# Patient Record
Sex: FEMALE | Race: WHITE | NOT HISPANIC OR LATINO | Employment: OTHER | URBAN - METROPOLITAN AREA
[De-identification: names, ages, dates, MRNs, and addresses within clinical notes are randomized per-mention and may not be internally consistent; named-entity substitution may affect disease eponyms.]

---

## 2017-01-10 ENCOUNTER — TELEPHONE (OUTPATIENT)
Dept: GASTROENTEROLOGY | Facility: CLINIC | Age: 72
End: 2017-01-10

## 2017-01-10 NOTE — TELEPHONE ENCOUNTER
----- Message from Sierra Amaya sent at 1/10/2017 11:49 AM CST -----  Patient is requesting to reschedule her colonoscopy on 01/19/17 contact patient at [phone] to reschedule.    Thank you

## 2017-01-12 ENCOUNTER — LAB VISIT (OUTPATIENT)
Dept: LAB | Facility: HOSPITAL | Age: 72
End: 2017-01-12
Attending: INTERNAL MEDICINE
Payer: MEDICARE

## 2017-01-12 ENCOUNTER — OFFICE VISIT (OUTPATIENT)
Dept: HEMATOLOGY/ONCOLOGY | Facility: CLINIC | Age: 72
End: 2017-01-12
Payer: MEDICARE

## 2017-01-12 VITALS
HEIGHT: 65 IN | RESPIRATION RATE: 16 BRPM | BODY MASS INDEX: 20.61 KG/M2 | DIASTOLIC BLOOD PRESSURE: 77 MMHG | HEART RATE: 81 BPM | TEMPERATURE: 99 F | WEIGHT: 123.69 LBS | SYSTOLIC BLOOD PRESSURE: 138 MMHG

## 2017-01-12 DIAGNOSIS — F31.0 BIPOLAR AFFECTIVE DISORDER, CURRENT EPISODE HYPOMANIC: Primary | ICD-10-CM

## 2017-01-12 DIAGNOSIS — Z98.84 GASTRIC BYPASS STATUS FOR OBESITY: ICD-10-CM

## 2017-01-12 DIAGNOSIS — I27.20 PULMONARY HYPERTENSION: ICD-10-CM

## 2017-01-12 DIAGNOSIS — Z98.890 S/P MVR (MITRAL VALVE REPAIR): ICD-10-CM

## 2017-01-12 DIAGNOSIS — I25.10 CORONARY ARTERY DISEASE INVOLVING NATIVE CORONARY ARTERY OF NATIVE HEART WITHOUT ANGINA PECTORIS: ICD-10-CM

## 2017-01-12 DIAGNOSIS — Z95.0 PACEMAKER: ICD-10-CM

## 2017-01-12 DIAGNOSIS — D64.9 SEVERE ANEMIA: ICD-10-CM

## 2017-01-12 DIAGNOSIS — E53.8 B12 DEFICIENCY: ICD-10-CM

## 2017-01-12 DIAGNOSIS — D50.0 IRON DEFICIENCY ANEMIA DUE TO CHRONIC BLOOD LOSS: ICD-10-CM

## 2017-01-12 LAB
ALBUMIN SERPL BCP-MCNC: 4.2 G/DL
ALP SERPL-CCNC: 90 U/L
ALT SERPL W/O P-5'-P-CCNC: 31 U/L
ANION GAP SERPL CALC-SCNC: 12 MMOL/L
AST SERPL-CCNC: 28 U/L
BASOPHILS # BLD AUTO: 0.05 K/UL
BASOPHILS NFR BLD: 1.1 %
BILIRUB SERPL-MCNC: 0.5 MG/DL
BUN SERPL-MCNC: 16 MG/DL
CALCIUM SERPL-MCNC: 9.1 MG/DL
CHLORIDE SERPL-SCNC: 101 MMOL/L
CO2 SERPL-SCNC: 30 MMOL/L
CREAT SERPL-MCNC: 0.9 MG/DL
DIFFERENTIAL METHOD: ABNORMAL
EOSINOPHIL # BLD AUTO: 0.1 K/UL
EOSINOPHIL NFR BLD: 2.8 %
ERYTHROCYTE [DISTWIDTH] IN BLOOD BY AUTOMATED COUNT: 13.7 %
EST. GFR  (AFRICAN AMERICAN): >60 ML/MIN/1.73 M^2
EST. GFR  (NON AFRICAN AMERICAN): >60 ML/MIN/1.73 M^2
GLUCOSE SERPL-MCNC: 88 MG/DL
HCT VFR BLD AUTO: 34.1 %
HGB BLD-MCNC: 11.1 G/DL
LYMPHOCYTES # BLD AUTO: 1.3 K/UL
LYMPHOCYTES NFR BLD: 29.7 %
MCH RBC QN AUTO: 30.1 PG
MCHC RBC AUTO-ENTMCNC: 32.6 %
MCV RBC AUTO: 92 FL
MONOCYTES # BLD AUTO: 0.5 K/UL
MONOCYTES NFR BLD: 10.8 %
NEUTROPHILS # BLD AUTO: 2.4 K/UL
NEUTROPHILS NFR BLD: 55.6 %
PLATELET # BLD AUTO: 161 K/UL
PMV BLD AUTO: 10.8 FL
POTASSIUM SERPL-SCNC: 4.5 MMOL/L
PROT SERPL-MCNC: 6.6 G/DL
RBC # BLD AUTO: 3.69 M/UL
SODIUM SERPL-SCNC: 143 MMOL/L
WBC # BLD AUTO: 4.35 K/UL

## 2017-01-12 PROCEDURE — 99214 OFFICE O/P EST MOD 30 MIN: CPT | Mod: S$GLB,,, | Performed by: INTERNAL MEDICINE

## 2017-01-12 PROCEDURE — 1157F ADVNC CARE PLAN IN RCRD: CPT | Mod: S$GLB,,, | Performed by: INTERNAL MEDICINE

## 2017-01-12 PROCEDURE — 99499 UNLISTED E&M SERVICE: CPT | Mod: S$GLB,,, | Performed by: INTERNAL MEDICINE

## 2017-01-12 PROCEDURE — 99999 PR PBB SHADOW E&M-EST. PATIENT-LVL III: CPT | Mod: PBBFAC,,, | Performed by: INTERNAL MEDICINE

## 2017-01-12 PROCEDURE — 1159F MED LIST DOCD IN RCRD: CPT | Mod: S$GLB,,, | Performed by: INTERNAL MEDICINE

## 2017-01-12 PROCEDURE — 1160F RVW MEDS BY RX/DR IN RCRD: CPT | Mod: S$GLB,,, | Performed by: INTERNAL MEDICINE

## 2017-01-12 PROCEDURE — 3078F DIAST BP <80 MM HG: CPT | Mod: S$GLB,,, | Performed by: INTERNAL MEDICINE

## 2017-01-12 PROCEDURE — 1126F AMNT PAIN NOTED NONE PRSNT: CPT | Mod: S$GLB,,, | Performed by: INTERNAL MEDICINE

## 2017-01-12 PROCEDURE — 3075F SYST BP GE 130 - 139MM HG: CPT | Mod: S$GLB,,, | Performed by: INTERNAL MEDICINE

## 2017-01-12 RX ORDER — CARBAMAZEPINE 200 MG/1
200 TABLET ORAL 2 TIMES DAILY
Status: ON HOLD | COMMUNITY
End: 2019-04-19 | Stop reason: HOSPADM

## 2017-01-12 NOTE — PROGRESS NOTES
"Subjective:       Patient ID: Dulce Castro is a 71 y.o. female.    Chief Complaint:was receiving transfusion when she got hypotensive, taken to [facility], completed prbc in hospital. Was able to get one unit here the 1st day,   HPI:   A 70-year-old  woman, very well known to me. The patient has pulmonary  hypertension, coronary artery disease, anemia, neutropenia and had a history of  gastric bypass and bipolar disorder. She has been following clinically. She   states her pulmonary hypertension has stabilized. She follows with   pulmonologist, and she follows with Cardiology as well.lately the tegretol has been lowered. Not drinking, no other drugs. Feeling better now since dc from hospital 12/23, occult blood was positive, pt elected for op scopes  REVIEW OF SYSTEMS:   Has a pacemaker past 6 months, feels tired, weak, sleepy, and no go"  No CP occ palpitations, feels SOB on minial activity from pulmonary HTN, has occult blood positive at [facility]    has not noticed bleeding,   denies abd pain, nausea, vomiting, cough or sputum production   occ feet swelling    PHYSICAL EXAM:     Vitals:    01/12/17 1039   BP: 138/77   Pulse: 81   Resp: 16   Temp: 99.2 °F (37.3 °C)       GENERAL: Comfortable looking patient. Patient is in no distress.  Awake, alert and oriented to time, person and place.  No anxiety, or agitation.      HEENT: Normal conjunctivae and eyelids. WNL.  PERRLA 3 to 4 mm. No icterus, no pallor, no congestion, and no discharge noted.     NECK:  Supple. Trachea is central.  No crepitus.  No JVD or masses.    RESPIRATORY:  No intercostal retractions.  No dullness to percussion.  Chest is clear to auscultation.  No rales, rhonchi or wheezes.  No crepitus.  Good air entry bilaterally.    CARDIOVASCULAR:  S1 and S2 are normally heard without murmurs or gallops.  All peripheral pulses are present.    ABDOMEN:  Normal abdomen.  No hepatosplenomegaly.  No free fluid.  Bowel sounds are present.  No hernia noted. " No masses.  No rebound or tenderness.  No guarding or rigidity.  Umbilicus is midline.    LYMPHATICS:  No axillary, cervical, supraclavicular, submental, or inguinal lymphadenopathy.    SKIN/MUSCULOSKELETAL:  There is no evidence of excoriation marks or ecchmosis.  No rashes.  No cyanosis.  No clubbing.  No joint or skeletal deformities noted.  Normal range of motion.    NEUROLOGIC:  Higher functions are appropriate.  No cranial nerve deficits.  Normal esbastián.  Normal strength.  Motor and sensory functions are normal.  Deep tendon reflexes are normal.    GENITAL/RECTAL:  Exams are deferred.      Laboratory:     CBC:  Lab Results   Component Value Date    WBC 4.35 01/12/2017    RBC 3.69 (L) 01/12/2017    HGB 11.1 (L) 01/12/2017    HCT 34.1 (L) 01/12/2017    MCV 92 01/12/2017    MCH 30.1 01/12/2017    MCHC 32.6 01/12/2017    RDW 13.7 01/12/2017     01/12/2017    MPV 10.8 01/12/2017    GRAN 2.4 01/12/2017    GRAN 55.6 01/12/2017    LYMPH 1.3 01/12/2017    LYMPH 29.7 01/12/2017    MONO 0.5 01/12/2017    MONO 10.8 01/12/2017    EOS 0.1 01/12/2017    BASO 0.05 01/12/2017    EOSINOPHIL 2.8 01/12/2017    BASOPHIL 1.1 01/12/2017       BMP: BMP  Lab Results   Component Value Date     01/12/2017    K 4.5 01/12/2017     01/12/2017    CO2 30 (H) 01/12/2017    BUN 16 01/12/2017    CREATININE 0.9 01/12/2017    CALCIUM 9.1 01/12/2017    ANIONGAP 12 01/12/2017    ESTGFRAFRICA >60 01/12/2017    EGFRNONAA >60 01/12/2017       LFT:   Lab Results   Component Value Date    ALT 31 01/12/2017    AST 28 01/12/2017    ALKPHOS 90 01/12/2017    BILITOT 0.5 01/12/2017     Lab Results   Component Value Date    UHRQYZRU51 228 12/13/2016     Haptoglobin less than 10  Assessment/Plan:       1. Bipolar affective disorder, current episode hypomanic    2. Pulmonary hypertension    3. Coronary artery disease involving native coronary artery of native heart without angina pectoris    4. Iron deficiency anemia due to chronic blood loss     5. S/P MVR (mitral valve repair)    6. Pacemaker      hgb has stabilized, may have had a component of hemolysis.   Will check ash as well  Has appt with Dr. magana for scopes in feb.   Needs to take her b12 daily.  Ash, ldh, haptoglobin, cbc, b12, cmp   rtc 4-6 weeks

## 2017-01-23 ENCOUNTER — CLINICAL SUPPORT (OUTPATIENT)
Dept: CARDIOLOGY | Facility: CLINIC | Age: 72
End: 2017-01-23
Payer: MEDICARE

## 2017-01-23 DIAGNOSIS — R00.1 BRADYCARDIA: ICD-10-CM

## 2017-01-23 DIAGNOSIS — Z95.0 CARDIAC PACEMAKER IN SITU: Primary | ICD-10-CM

## 2017-01-23 DIAGNOSIS — Z95.0 CARDIAC PACEMAKER IN SITU: ICD-10-CM

## 2017-01-23 PROCEDURE — 93296 REM INTERROG EVL PM/IDS: CPT | Mod: S$GLB,,, | Performed by: INTERNAL MEDICINE

## 2017-01-23 PROCEDURE — 93294 REM INTERROG EVL PM/LDLS PM: CPT | Mod: S$GLB,,, | Performed by: INTERNAL MEDICINE

## 2017-02-14 ENCOUNTER — TELEPHONE (OUTPATIENT)
Dept: GASTROENTEROLOGY | Facility: CLINIC | Age: 72
End: 2017-02-14

## 2017-02-14 NOTE — TELEPHONE ENCOUNTER
----- Message from Archana Freeman sent at 2/14/2017  9:11 AM CST -----  Contact: self  Patient wants speak with a nurse regarding the instruction for her colonoscopy. Please call back at [phone] (bbfm)

## 2017-02-16 RX ORDER — GENTAMICIN SULFATE 80 MG/100ML
80 INJECTION, SOLUTION INTRAVENOUS ONCE
Status: COMPLETED | OUTPATIENT
Start: 2017-02-17 | End: 2017-02-17

## 2017-02-17 ENCOUNTER — SURGERY (OUTPATIENT)
Age: 72
End: 2017-02-17

## 2017-02-17 ENCOUNTER — HOSPITAL ENCOUNTER (OUTPATIENT)
Facility: HOSPITAL | Age: 72
Discharge: HOME | End: 2017-02-17
Attending: INTERNAL MEDICINE | Admitting: INTERNAL MEDICINE
Payer: MEDICARE

## 2017-02-17 ENCOUNTER — ANESTHESIA EVENT (OUTPATIENT)
Dept: ENDOSCOPY | Facility: HOSPITAL | Age: 72
End: 2017-02-17
Payer: MEDICARE

## 2017-02-17 ENCOUNTER — ANESTHESIA (OUTPATIENT)
Dept: ENDOSCOPY | Facility: HOSPITAL | Age: 72
End: 2017-02-17
Payer: MEDICARE

## 2017-02-17 ENCOUNTER — TELEPHONE (OUTPATIENT)
Dept: FAMILY MEDICINE | Facility: CLINIC | Age: 72
End: 2017-02-17

## 2017-02-17 VITALS
TEMPERATURE: 98 F | SYSTOLIC BLOOD PRESSURE: 131 MMHG | BODY MASS INDEX: 19.99 KG/M2 | HEIGHT: 65 IN | WEIGHT: 120 LBS | RESPIRATION RATE: 16 BRPM | OXYGEN SATURATION: 99 % | HEART RATE: 64 BPM | DIASTOLIC BLOOD PRESSURE: 74 MMHG

## 2017-02-17 VITALS — RESPIRATION RATE: 20 BRPM

## 2017-02-17 DIAGNOSIS — D64.9 ANEMIA: ICD-10-CM

## 2017-02-17 LAB
H PYLORI INDEX VALUE: NEGATIVE
HGB BLD-MCNC: 10.4 G/DL

## 2017-02-17 PROCEDURE — 88305 TISSUE EXAM BY PATHOLOGIST: CPT | Mod: 26,,, | Performed by: PATHOLOGY

## 2017-02-17 PROCEDURE — 63600175 PHARM REV CODE 636 W HCPCS: Mod: PO | Performed by: INTERNAL MEDICINE

## 2017-02-17 PROCEDURE — D9220A PRA ANESTHESIA: Mod: CRNA,,, | Performed by: NURSE ANESTHETIST, CERTIFIED REGISTERED

## 2017-02-17 PROCEDURE — 37000009 HC ANESTHESIA EA ADD 15 MINS: Mod: PO | Performed by: INTERNAL MEDICINE

## 2017-02-17 PROCEDURE — 43239 EGD BIOPSY SINGLE/MULTIPLE: CPT | Mod: 51,,, | Performed by: INTERNAL MEDICINE

## 2017-02-17 PROCEDURE — 45385 COLONOSCOPY W/LESION REMOVAL: CPT | Mod: ,,, | Performed by: INTERNAL MEDICINE

## 2017-02-17 PROCEDURE — 27201089 HC SNARE, DISP (ANY): Mod: PO | Performed by: INTERNAL MEDICINE

## 2017-02-17 PROCEDURE — 25000003 PHARM REV CODE 250: Mod: PO | Performed by: INTERNAL MEDICINE

## 2017-02-17 PROCEDURE — 25000003 PHARM REV CODE 250: Mod: PO | Performed by: NURSE ANESTHETIST, CERTIFIED REGISTERED

## 2017-02-17 PROCEDURE — 45385 COLONOSCOPY W/LESION REMOVAL: CPT | Mod: PO | Performed by: INTERNAL MEDICINE

## 2017-02-17 PROCEDURE — 85018 HEMOGLOBIN: CPT | Mod: PO

## 2017-02-17 PROCEDURE — 27201012 HC FORCEPS, HOT/COLD, DISP: Mod: PO | Performed by: INTERNAL MEDICINE

## 2017-02-17 PROCEDURE — 43239 EGD BIOPSY SINGLE/MULTIPLE: CPT | Mod: PO | Performed by: INTERNAL MEDICINE

## 2017-02-17 PROCEDURE — 88305 TISSUE EXAM BY PATHOLOGIST: CPT | Performed by: PATHOLOGY

## 2017-02-17 PROCEDURE — 63600175 PHARM REV CODE 636 W HCPCS: Mod: PO | Performed by: NURSE ANESTHETIST, CERTIFIED REGISTERED

## 2017-02-17 PROCEDURE — 37000008 HC ANESTHESIA 1ST 15 MINUTES: Mod: PO | Performed by: INTERNAL MEDICINE

## 2017-02-17 PROCEDURE — D9220A PRA ANESTHESIA: Mod: ANES,,, | Performed by: ANESTHESIOLOGY

## 2017-02-17 PROCEDURE — 87449 NOS EACH ORGANISM AG IA: CPT | Mod: PO | Performed by: INTERNAL MEDICINE

## 2017-02-17 RX ORDER — FLUTICASONE PROPIONATE 50 MCG
2 SPRAY, SUSPENSION (ML) NASAL DAILY
Qty: 16 G | Refills: 11 | Status: SHIPPED | OUTPATIENT
Start: 2017-02-17 | End: 2017-05-23

## 2017-02-17 RX ORDER — LIDOCAINE HYDROCHLORIDE 10 MG/ML
1 INJECTION INFILTRATION; PERINEURAL ONCE
Status: COMPLETED | OUTPATIENT
Start: 2017-02-17 | End: 2017-02-17

## 2017-02-17 RX ORDER — FENTANYL CITRATE 50 UG/ML
INJECTION, SOLUTION INTRAMUSCULAR; INTRAVENOUS
Status: DISCONTINUED | OUTPATIENT
Start: 2017-02-17 | End: 2017-02-17

## 2017-02-17 RX ORDER — LIDOCAINE HCL/PF 100 MG/5ML
SYRINGE (ML) INTRAVENOUS
Status: DISCONTINUED | OUTPATIENT
Start: 2017-02-17 | End: 2017-02-17

## 2017-02-17 RX ORDER — PROPOFOL 10 MG/ML
VIAL (ML) INTRAVENOUS
Status: DISCONTINUED | OUTPATIENT
Start: 2017-02-17 | End: 2017-02-17

## 2017-02-17 RX ORDER — SODIUM CHLORIDE, SODIUM LACTATE, POTASSIUM CHLORIDE, CALCIUM CHLORIDE 600; 310; 30; 20 MG/100ML; MG/100ML; MG/100ML; MG/100ML
INJECTION, SOLUTION INTRAVENOUS CONTINUOUS
Status: DISCONTINUED | OUTPATIENT
Start: 2017-02-17 | End: 2017-02-17 | Stop reason: HOSPADM

## 2017-02-17 RX ADMIN — PROPOFOL 10 MG: 10 INJECTION, EMULSION INTRAVENOUS at 09:02

## 2017-02-17 RX ADMIN — LIDOCAINE HYDROCHLORIDE 50 MG: 20 INJECTION, SOLUTION INTRAVENOUS at 09:02

## 2017-02-17 RX ADMIN — PROPOFOL 10 MG: 10 INJECTION, EMULSION INTRAVENOUS at 10:02

## 2017-02-17 RX ADMIN — PROPOFOL 20 MG: 10 INJECTION, EMULSION INTRAVENOUS at 10:02

## 2017-02-17 RX ADMIN — PROPOFOL 30 MG: 10 INJECTION, EMULSION INTRAVENOUS at 09:02

## 2017-02-17 RX ADMIN — AMPICILLIN SODIUM 2 G: 500 INJECTION, POWDER, FOR SOLUTION INTRAMUSCULAR; INTRAVENOUS at 08:02

## 2017-02-17 RX ADMIN — SODIUM CHLORIDE, SODIUM LACTATE, POTASSIUM CHLORIDE, AND CALCIUM CHLORIDE: .6; .31; .03; .02 INJECTION, SOLUTION INTRAVENOUS at 08:02

## 2017-02-17 RX ADMIN — FENTANYL CITRATE 25 MCG: 50 INJECTION, SOLUTION INTRAMUSCULAR; INTRAVENOUS at 09:02

## 2017-02-17 RX ADMIN — GENTAMICIN SULFATE 80 MG: 80 INJECTION, SOLUTION INTRAVENOUS at 09:02

## 2017-02-17 RX ADMIN — TOPICAL ANESTHETIC 1 EACH: 200 SPRAY DENTAL; PERIODONTAL at 09:02

## 2017-02-17 RX ADMIN — LIDOCAINE HYDROCHLORIDE 1 ML: 10 INJECTION, SOLUTION EPIDURAL; INFILTRATION; INTRACAUDAL; PERINEURAL at 08:02

## 2017-02-17 NOTE — ANESTHESIA PREPROCEDURE EVALUATION
02/17/2017  Dulce Castro is a 71 y.o., female.    [facility] Anesthesia Evaluation    I have reviewed the Patient Summary Reports.    I have reviewed the Nursing Notes.   I have reviewed the Medications.     Review of Systems  Anesthesia Hx:  No problems with previous Anesthesia    Social:  Non-Smoker    Hematology/Oncology:     Oncology Normal    -- Anemia:   EENT/Dental:EENT/Dental Normal   Cardiovascular:   Hypertension CAD  CABG/stent  Denies Dysrhythmias.  CHF VEE NYHA Classification II ECG has been reviewed. Pulmonary HTN    12/19/16 echo:    CONCLUSIONS     1 - Biatrial enlargement.     2 - Normal left ventricular systolic function (EF 55-60%).     3 - Left ventricular diastolic dysfunction.     4 - Normal right ventricular systolic function .     5 - Pulmonary hypertension. The estimated PA systolic pressure is 45 mmHg.     6 - Mild aortic stenosis, ELLIOTT = 1.89 cm2, peak velocity = 2.06 m/s.     7 - Moderate to severe aortic regurgitation.     8 - Trivial mitral regurgitation.     9 - Moderate tricuspid regurgitation.    Pacemaker, EKG, atrial pacing        Pulmonary:   Shortness of breath Recent URI: pulmonary HTN. Sleep Apnea On home O2 for Pulm HTN   Hepatic/GI:   GERD, well controlled    Neurological:   Neuromuscular Disease,    Endocrine:   Hypothyroidism    Psych:   Psychiatric History          Physical Exam  General:  Well nourished    Airway/Jaw/Neck:  Airway Findings: Mouth Opening: Normal Tongue: Normal  General Airway Assessment: Adult  Mallampati: II  Improves to I with phonation.  TM Distance: 4 - 6 cm       Chest/Lungs:  Chest/Lungs Findings: Clear to auscultation, Normal Respiratory Rate     Heart/Vascular:  Heart Findings: Rate: Normal  Rhythm: Regular Rhythm             Anesthesia Plan  Type of Anesthesia, risks & benefits discussed:  Anesthesia Type:  general  Patient's Preference:    Intra-op Monitoring Plan:   Intra-op Monitoring Plan Comments:   Post Op Pain Control Plan:   Post Op Pain Control Plan Comments:   Induction:    Beta Blocker:  Patient is not currently on a Beta-Blocker (No further documentation required).       Informed Consent: Patient understands risks and agrees with Anesthesia plan.  Questions answered. Anesthesia consent signed with patient.  ASA Score: 3     Day of Surgery Review of History & Physical: I have interviewed and examined the patient. I have reviewed the patient's H&P dated:  There are no significant changes.          Ready For Surgery From Anesthesia Perspective.

## 2017-02-17 NOTE — BRIEF OP NOTE
Discharge Note  Short Stay      SUMMARY     Admit Date: 2/17/2017    Attending Physician: Artie Mills Jr., MD     Discharge Physician: Artie Mills Jr., MD    Discharge Date: 2/17/2017 11:09 AM    Final Diagnosis: Iron deficiency anemia, unspecified iron deficiency anemia type [D50.9]  Impression:          - Normal oropharynx.                       - Normal esophagus.                       - Z-line regular, 35 cm from the incisors.                       - Gastric mucosal atrophy. Biopsied.                       - A few benign gastric polyps. Resected and                        retrieved.                       - Gastric bypass with a large-sized pouch and intact                        staple line. Gastrojejunal anastomosis characterized                        by healthy appearing mucosa.                       - Normal examined jejunum.  Recommendation:      - Perform a colonoscopy.                       - Await pathology and CLOtest results.                       - Continue present medications.                       - If colonoscopy negative, a course of iron and                        vitamin C (such as NIFEREX or FEOSOL; and Vitamin C                        500 mg, 1 of each every day with meals).                       - Return to referring physician, Dr. Lee, as                        previously scheduled.                       - Repeat the upper endoscopy PRN.    Impression:          - One 2 to 3 mm polyp in the proximal sigmoid colon,                        removed with a cold snare. Resected and retrieved.                       - Diverticulosis in the sigmoid colon.                       - Tortuous colon.                       - Non-bleeding internal hemorrhoids.                       - Redundant colon.                       - The examination was otherwise normal.                       - The examined portion of the ileum was normal.  Recommendation:      - Discharge patient to home.                        - Await pathology results.                       - High fiber diet.                       - Take a PROBIOTIC, such as a carton of GREEK YOGURT                        (Chobani or Oikos, or Activia or Dannon); or tablets                        of ALIGN or CULTURELLE or DARLENE-Q (all                        non-prescription), every day for a month.                       - Take iron and vitamin C (such as NIFEREX or                        FEOSOL; and Vitamin C 500 mg, 1 of each every day                        with meals).                       - Call the [facility] in 2 weeks for reports (if                        you haven't heard from us sooner) [phone].                       - Repeat colonoscopy in 6 years for screening                        purposes.                       - Continue present medications.    Artie Mills MD  2/17/2017   Disposition: HOME OR SELF CARE    Patient Instructions:   Current Discharge Medication List      CONTINUE these medications which have NOT CHANGED    Details   alprazolam (XANAX) 1 MG tablet Take 1 mg by mouth 4 (four) times daily as needed.      azelastine (ASTELIN) 137 mcg (0.1 %) nasal spray 1 spray (137 mcg total) by Nasal route 2 (two) times daily. 1-2 sprays each nare twice daily.  Qty: 30 mL, Refills: 11    Associated Diagnoses: Environmental and seasonal allergies      carbamazepine (TEGRETOL) 200 mg tablet Take 200 mg by mouth 2 (two) times daily.      diphenhydrAMINE (BENADRYL) 25 mg capsule Take 1 each (25 mg total) by mouth daily as needed for Itching.  Refills: 0      furosemide (LASIX) 40 MG tablet Take 1 tablet (40 mg total) by mouth Every 3 (three) days. Hold if sbp < 100      levothyroxine (SYNTHROID) 112 MCG tablet Take 1 tablet (112 mcg total) by mouth once daily.  Qty: 30 tablet, Refills: 11    Associated Diagnoses: Hypothyroidism due to acquired atrophy of thyroid      OXYGEN-AIR DELIVERY SYSTEMS MISC 3 L by Nasal route Daily. uses hs and prn for SOB       potassium chloride SA (K-DUR,KLOR-CON) 20 MEQ tablet Take 1 tablet (20 mEq total) by mouth once daily. X 3 days  Qty: 3 tablet, Refills: 0      promethazine (PHENERGAN) 25 MG suppository Place 1 suppository (25 mg total) rectally every 6 (six) hours as needed for Nausea.  Qty: 30 suppository, Refills: 2      tizanidine (ZANAFLEX) 4 MG tablet 4 mg 2 (two) times daily as needed.       trazodone (DESYREL) 300 MG tablet Take 0.5 tablets (150 mg total) by mouth nightly as needed for Insomnia.  Qty: 30 tablet, Refills: 0      venlafaxine (EFFEXOR-XR) 75 MG 24 hr capsule 75 mg. Three tablets daily 225 mg      metOLazone (ZAROXOLYN) 5 MG tablet Take 0.5 tablets (2.5 mg total) by mouth once a week. Hold if sbp < 100             Discharge Procedure Orders (must include Diet, Follow-up, Activity)    Follow Up:  Follow up with PCP as per your routine.  Please follow an anti reflux diet and a high fiber diet.  Activity as tolerated.    No driving day of procedure.    PROBIOTICS:  Now that your colon is so cleaned out, now is a good time for a round of PROBIOTICS.  Eat a container of Greek Yogurt, such as OIKOS or CHOBANI,  Or Activia or Dannon    Greek Yogurt.    Or Take a similar Probiotic product such as Align or Culturelle or Zulma-Q, every day for a month.                  (The products listed are non-prescription, but you may need to ask the pharmacist for their location.)  Repeat this 4-6 times a year.

## 2017-02-17 NOTE — IP AVS SNAPSHOT
[facility]  [address] [facility]  [place] [address]  Phone: [phone]           Patient Discharge Instructions     Our goal is to set you up for success. This packet includes information on your condition, medications, and your home care. It will help you to care for yourself so you don't get sicker and need to go back to the hospital.     Please ask your nurse if you have any questions.        There are many details to remember when preparing to leave the hospital. Here is what you will need to do:    1. Take your medicine. If you are prescribed medications, review your Medication List in the following pages. You may have new medications to  at the pharmacy and others that you'll need to stop taking. Review the instructions for how and when to take your medications. Talk with your doctor or nurses if you are unsure of what to do.     2. Go to your follow-up appointments. Specific follow-up information is listed in the following pages. Your may be contacted by a transition nurse or clinical provider about future appointments. Be sure we have all of the phone numbers to reach you, if needed. Please contact your provider's office if you are unable to make an appointment.     3. Watch for warning signs. Your doctor or nurse will give you detailed warning signs to watch for and when to call for assistance. These instructions may also include educational information about your condition. If you experience any of warning signs to your health, call your doctor.               [facility]  Unless otherwise directed by your provider, please contact [facility], our nurse care line that is available for 24/7 assistance.     [phone] (toll-free)    Registered nurses in the [facility] provide clinical advisement, health education, appointment booking, and other advisory services.                    ** Verify the list of medication(s) below is accurate and up  to date. Carry this with you in case of emergency. If your medications have changed, please notify your healthcare provider.             Medication List      CONTINUE taking these medications        Additional Info                      alprazolam 1 MG tablet   Commonly known as:  XANAX   Refills:  0   Dose:  1 mg    Instructions:  Take 1 mg by mouth 4 (four) times daily as needed.     Begin Date    AM    Noon    PM    Bedtime       azelastine 137 mcg (0.1 %) nasal spray   Commonly known as:  ASTELIN   Quantity:  30 mL   Refills:  11   Dose:  1 spray    Instructions:  1 spray (137 mcg total) by Nasal route 2 (two) times daily. 1-2 sprays each nare twice daily.     Begin Date    AM    Noon    PM    Bedtime       carbamazepine 200 mg tablet   Commonly known as:  TEGRETOL   Refills:  0   Dose:  200 mg    Instructions:  Take 200 mg by mouth 2 (two) times daily.     Begin Date    AM    Noon    PM    Bedtime       diphenhydrAMINE 25 mg capsule   Commonly known as:  BENADRYL   Refills:  0   Dose:  25 mg    Instructions:  Take 1 each (25 mg total) by mouth daily as needed for Itching.     Begin Date    AM    Noon    PM    Bedtime       furosemide 40 MG tablet   Commonly known as:  LASIX   Refills:  0   Dose:  40 mg    Instructions:  Take 1 tablet (40 mg total) by mouth Every 3 (three) days. Hold if sbp < 100     Begin Date    AM    Noon    PM    Bedtime       levothyroxine 112 MCG tablet   Commonly known as:  SYNTHROID   Quantity:  30 tablet   Refills:  11   Dose:  112 mcg    Instructions:  Take 1 tablet (112 mcg total) by mouth once daily.     Begin Date    AM    Noon    PM    Bedtime       metOLazone 5 MG tablet   Commonly known as:  ZAROXOLYN   Refills:  0   Dose:  2.5 mg    Instructions:  Take 0.5 tablets (2.5 mg total) by mouth once a week. Hold if sbp < 100     Begin Date    AM    Noon    PM    Bedtime       OXYGEN-AIR DELIVERY SYSTEMS MISC   Refills:  0   Dose:  3 L    Instructions:  3 L by Nasal route Daily. uses hs  and prn for SOB     Begin Date    AM    Noon    PM    Bedtime       potassium chloride SA 20 MEQ tablet   Commonly known as:  K-DUR,KLOR-CON   Quantity:  3 tablet   Refills:  0   Dose:  20 mEq    Instructions:  Take 1 tablet (20 mEq total) by mouth once daily. X 3 days     Begin Date    AM    Noon    PM    Bedtime       promethazine 25 MG suppository   Commonly known as:  PHENERGAN   Quantity:  30 suppository   Refills:  2   Dose:  25 mg    Instructions:  Place 1 suppository (25 mg total) rectally every 6 (six) hours as needed for Nausea.     Begin Date    AM    Noon    PM    Bedtime       tizanidine 4 MG tablet   Commonly known as:  ZANAFLEX   Refills:  0   Dose:  4 mg    Instructions:  4 mg 2 (two) times daily as needed.     Begin Date    AM    Noon    PM    Bedtime       trazodone 300 MG tablet   Commonly known as:  DESYREL   Quantity:  30 tablet   Refills:  0   Dose:  150 mg    Instructions:  Take 0.5 tablets (150 mg total) by mouth nightly as needed for Insomnia.     Begin Date    AM    Noon    PM    Bedtime       venlafaxine 75 MG 24 hr capsule   Commonly known as:  EFFEXOR-XR   Refills:  0   Dose:  75 mg    Instructions:  75 mg. Three tablets daily 225 mg     Begin Date    AM    Noon    PM    Bedtime                  Please bring to all follow up appointments:    1. A copy of your discharge instructions.  2. All medicines you are currently taking in their original bottles.  3. Identification and insurance card.    Please arrive 15 minutes ahead of scheduled appointment time.    Please call 24 hours in advance if you must reschedule your appointment and/or time.        Your Scheduled Appointments     Feb 20, 2017  3:00 PM CST   Non-Fasting Lab with LAB, [facility] - [facility] ([facility])    [address] [place] [address]   [phone]            Feb 23, 2017 11:00 AM CST   Established Patient Visit with Corrie Lee MD   [facility] - Hematology ([facility]  at [place] [id number] [facility] [address]  [facility] [id number] [phone]                Discharge Instructions     Future Orders    Activity as tolerated     Comments:    No driving for 24 hours.        Discharge Instructions         Procedural Sedation (Adult)  You have been given medicine by vein to make you sleep during your surgery. This may have included both a pain medicine and sleeping medicine. Most of the effects have worn off. But you may still have some drowsiness for the next 6 to 8 hours.  Home care  Follow these guidelines when you get home:  · For the next 8 hours, you should be watched by a responsible adult. This person should make sure your condition is not getting worse.  · Don't take any medicine by mouth for pain or for sleep during the next 4 hours. These might react with the medicines you were given in the hospital. This could cause a much stronger response than usual.  · Don't drink any alcohol for the next 24 hours.  · Don't drive, operate dangerous machinery, or make important business or personal decisions during the next 24 hours.  Follow-up care  Follow up with your healthcare provider if you are not alert and back to your usual level of activity within 12 hours.  When to seek medical advice  Call your healthcare provider right away if any of these occur:  · Drowsiness gets worse  · Weakness or dizziness gets worse  · Repeated vomiting  · You cannot be awakened   Date Last Reviewed: 10/18/2016  © 2917-8026 [employer]. [address] [place] [address]. All rights reserved. This information is not intended as a substitute for professional medical care. Always follow your healthcare professional's instructions.      PROBIOTICS:  Now that your colon is so cleaned out, now is a good time for a round of PROBIOTICS.  Eat a container of Greek Yogurt, such as OIKOS or CHOBANI,  Or Activia or Dannon    Greek Yogurt.    Or Take a similar Probiotic product such as  Align or Culturelle or Zulma-Q, every day for a month.                  (The products listed are non-prescription, but you may need to ask the pharmacist for their location.)  Repeat this 4-6 times a year.        Tips to Control Acid Reflux    To control acid reflux, youll need to make some basic diet and lifestyle changes. The simple steps outlined below may be all youll need to ease discomfort.  Watch what you eat  · Avoid fatty foods and spicy foods.  · Eat fewer acidic foods, such as citrus and tomato-based foods. These can increase symptoms.  · Limit drinking alcohol, caffeine, and fizzy beverages. All increase acid reflux.  · Try limiting chocolate, peppermint, and spearmint. These can worsen acid reflux in some people.  Watch when you eat  · Avoid lying down for 3 hours after eating.  · Do not snack before going to bed.  Raise your head  Raising your head and upper body by 4 to 6 inches helps limit reflux when youre lying down. Put blocks under the head of your bed frame to raise it.  Other changes  · Lose weight, if you need to  · Dont exercise near bedtime  · Avoid tight-fitting clothes  · Limit aspirin and ibuprofen  · Stop smoking   Date Last Reviewed: 7/1/2016  © 4156-8816 [employer]. [address] [place] [address], [place] [address]. All rights reserved. This information is not intended as a substitute for professional medical care. Always follow your healthcare professional's instructions.        High-Fiber Diet  Fiber is in fruits, vegetables, cereals, and grains. Fiber passes through your body undigested. A high-fiber diet helps food move through your intestinal tract. The added bulk is helpful in preventing constipation. In people with diverticulosis, fiber helps clean out the pouches along the colon wall. It also prevents new pouches from forming. A high-fiber diet reduces the risk of colon cancer. It also lowers blood cholesterol and prevents high blood sugar in people with  diabetes.    The fiber-rich foods listed below should be part of your diet. If you are not used to high-fiber foods, start with 1 or 2 foods from this list. Every 3 to 4 days add a new one to your diet. Do this until you are eating 4 high-fiber foods per day. This should give you 20 to 35 grams of fiber a day. It is also important to drink a lot of water when you are on this diet. You should have 6 to 8 glasses of water a day. Water makes the fiber swell and increases the benefit.  Foods high in dietary fiber  The following foods are high in dietary fiber:  · Breads. Breads made with 100% whole-wheat flour; jose, wheat, or rye crackers; whole-grain tortillas, bran muffins.  · Cereals. Whole-grain and bran cereals with bran (shredded wheat, wheat flakes, raisin bran, corn bran); oatmeal, rolled oats, granola, and brown rice.  · Fruits. Fresh fruits and their edible skins (pears, prunes, raisins, berries, apples, and apricots); bananas, citrus fruit, mangoes, pineapple; and prune juice.  · Nuts. Any nuts and seeds.  · Vegetables. Best served raw or lightly cooked. All types, especially: green peas, celery, eggplant, potatoes, spinach, broccoli, Douglas sprouts, winter squash, carrots, cauliflower, soybeans, lentils, and fresh and dried beans of all kinds.  · Other. Popcorn, any spices.  Date Last Reviewed: 8/1/2016  © 3105-9438 [employer]. [address] [place] [address]. All rights reserved. This information is not intended as a substitute for professional medical care. Always follow your healthcare professional's instructions.            Primary Diagnosis     Your primary diagnosis was:  Anemia      Admission Information     Date & Time Provider Department CSN    2/17/2017  7:53 AM Artie Mills Jr., MD [facility] [id number]      Care Providers     Provider Role Specialty Primary office phone    Artie Mills Jr., MD Attending Provider Gastroenterology [phone]  "   Artie Mills Jr., MD Surgeon  Gastroenterology [phone]      Your Vitals Were     BP Pulse Temp Resp Height Weight    163/72 (BP Location: Left arm, Patient Position: Lying, BP Method: Automatic) 70 98.2 °F (36.8 °C) 16 5' 4.5" (1.638 m) 54.4 kg (120 lb)    Last Period SpO2 BMI          (LMP Unknown) 97% 20.28 kg/m2        Recent Lab Values        3/6/2015                          11:37 AM           A1C 4.7                       Pending Labs     Order Current Status    Specimen to Pathology - Surgery Collected (02/17/17 1036)    POCT H. pylori In process      Allergies as of 2/17/2017        Reactions    Morphine Other (See Comments)    Difficulty swallowing    Tramadol Other (See Comments)    itching      Advance Directives     An advance directive is a document which, in the event you are no longer able to make decisions for yourself, tells your healthcare team what kind of treatment you do or do not want to receive, or who you would like to make those decisions for you.  If you do not currently have an advance directive, [facility] encourages you to create one.  For more information call:  [phone]), [phone] ([phone]),  or log on to [email or web address].        Language Assistance Services     ATTENTION: Language assistance services are available, free of charge. Please call [phone].      ATENCIÓN: Si habla español, tiene a chandler disposición servicios gratuitos de asistencia lingüística. Llame al [phone].     [facility] Ý: N?u b?n nói Ti?ng Vi?t, có các d?ch v? h? tr? ngôn ng? mi?n phí dành cho b?n. G?i s? [phone].        [facility] Sign-Up     Activating your [facility] account is as easy as 1-2-3!     1) Visit [email or web address], select Sign Up Now, enter this activation code and your date of birth, then select Next.  [email or web address]  Expires: 4/3/2017 11:25 AM      2) Create a username and password to use when you visit [facility] in the future and select a " security question in case you lose your password and select Next.    3) Enter your e-mail address and click Sign Up!    Additional Information  If you have questions, please e-mail [email or web address] or call [phone] to talk to our [facility] staff. Remember, [facility] is NOT to be used for urgent needs. For medical emergencies, dial 911.          [facility] complies with applicable Federal civil rights laws and does not discriminate on the basis of race, color, national origin, age, disability, or sex.

## 2017-02-17 NOTE — H&P
"History & Physical - Short Stay  Gastroenterology      SUBJECTIVE:     Procedure: Gastroscopy and Colonoscopy    Chief Complaint/Indication for Procedure: Anemia with occult positve stool.     History of Present Illness:  Office Visit     12/20/2016  [facility] - Hematology       Corrie Lee MD   Hematology and Oncology    Severe anemia +7 more   Dx        Progress Notes      Subjective:        Patient ID: Dulce Castro is a 71 y.o. female.     Chief Complaint:recently was at [facility] after passing out drinking 2 bloody delores's after she took her regular xanax.  Pt stating she feels very low, her grandson is drinking again, she has been stressed lately. Hemoglobin has got lower  HPI:   A 70-year-old  woman, very well known to me. The patient has pulmonary  hypertension, coronary artery disease, anemia, neutropenia and had a history of  gastric bypass and bipolar disorder. She has been following clinically. She   states her pulmonary hypertension has stabilized. She follows with   pulmonologist, and she follows with Cardiology as well. r  REVIEW OF SYSTEMS:   Has a pacemaker past 6 months, feels tired, weak, sleepy, and no go"  No CP occ palpitations, feels SOB on minial activity from pulmonary HTN, has occult blood positive at [facility]   has not noticed bleeding,  denies abd pain, nausea, vomiting, cough or sputum production  occ feet swelling    Assessment/Plan:       1. Severe anemia    2. Bipolar affective disorder, currently depressed, mild    3. Pulmonary hypertension    4. Coronary artery disease involving native coronary artery of native heart without angina pectoris    5. Anemia, unspecified type    6. B12 deficiency    7. Gastric bypass status for obesity    8. Other specified hypothyroidism      acute drop in hgb in one week to 8.1 with occult positve stool.   Ref for colonoscopy, repeat iron study  prbc 2 units asap , type x match today and draw iron study same time. Ha iron " studies from a week ago but that was before the occult blood was noted and the hgb dropped significantly.  rtc 3 weeks with cbc, cmp,                 Facility-Administered Medications Prior to Admission   Medication    furosemide tablet 20 mg     PTA Medications   Medication Sig    alprazolam (XANAX) 1 MG tablet Take 1 mg by mouth 4 (four) times daily as needed.    azelastine (ASTELIN) 137 mcg (0.1 %) nasal spray 1 spray (137 mcg total) by Nasal route 2 (two) times daily. 1-2 sprays each nare twice daily.    carbamazepine (TEGRETOL) 200 mg tablet Take 200 mg by mouth 2 (two) times daily.    diphenhydrAMINE (BENADRYL) 25 mg capsule Take 1 each (25 mg total) by mouth daily as needed for Itching.    furosemide (LASIX) 40 MG tablet Take 1 tablet (40 mg total) by mouth Every 3 (three) days. Hold if sbp < 100    levothyroxine (SYNTHROID) 112 MCG tablet Take 1 tablet (112 mcg total) by mouth once daily.    OXYGEN-AIR DELIVERY SYSTEMS MISC 3 L by Nasal route Daily. uses hs and prn for SOB    potassium chloride SA (K-DUR,KLOR-CON) 20 MEQ tablet Take 1 tablet (20 mEq total) by mouth once daily. X 3 days    promethazine (PHENERGAN) 25 MG suppository Place 1 suppository (25 mg total) rectally every 6 (six) hours as needed for Nausea.    tizanidine (ZANAFLEX) 4 MG tablet 4 mg 2 (two) times daily as needed.     trazodone (DESYREL) 300 MG tablet Take 0.5 tablets (150 mg total) by mouth nightly as needed for Insomnia.    venlafaxine (EFFEXOR-XR) 75 MG 24 hr capsule 75 mg. Three tablets daily 225 mg    metOLazone (ZAROXOLYN) 5 MG tablet Take 0.5 tablets (2.5 mg total) by mouth once a week. Hold if sbp < 100       Review of patient's allergies indicates:   Allergen Reactions    Morphine Other (See Comments)     Difficulty swallowing    Tramadol Other (See Comments)     itching        Past Medical History   Diagnosis Date    Allergy     Anemia     Anticoagulant long-term use     Anxiety     Arthritis     Bipolar  "disorder     Blood transfusion     Carpal tunnel syndrome     Cataract     CHF (congestive heart failure)     Depression     VEE (dyspnea on exertion)      general activity    GERD (gastroesophageal reflux disease)     Heart murmur     Hypertension     Irritable bladder     Meningitis     Mitral valve prolapse     On home O2      @ 3 liters    TEE (obstructive sleep apnea)     Pulmonary HTN     Thyroid disease      Past Surgical History   Procedure Laterality Date    Open heart surgery       mitral valve repair    Cholecystectomy  2010    Gastric bypass  2004    Hysterectomy       separate procedures for uterus, ovaries and then an ex-lap for adhesions    Cardiac surgery  2007     MVR     bladder stimulator       [employer]    Carpal tunnel release Right     Appendectomy      Eye surgery Bilateral      cataracts    Cardiac pacemaker placement  10/2016    Foot surgery Right      2 Hematomas on nerves    Esophagogastroduodenoscopy  09/14/2011     BEAU.   Normal esophagus.  NERD.  Gastric bypass.  Normal anastomosis.  Normal jejunum.    Abdominal surgery       Family History   Problem Relation Age of Onset    Heart disease Mother     Heart disease Father     Heart disease Brother     Collagen disease Neg Hx      Social History   Substance Use Topics    Smoking status: Never Smoker    Smokeless tobacco: Never Used    Alcohol use 1.2 oz/week     1 Glasses of wine, 1 Shots of liquor per week      Comment: once every 6 month Per Pt statement         OBJECTIVE:     Vital Signs (Most Recent)  Temp: 98.8 °F (37.1 °C) (02/17/17 0847)  Pulse: 82 (02/17/17 0847)  Resp: 16 (02/17/17 0847)  BP: (!) 154/67 (02/17/17 0847)  SpO2: 95 % (02/17/17 0847)    Physical Exam:  :Ht 5' 4.5" (1.638 m)    Wt 56.1 kg (123 lb 10.9 oz)    BMI 20.9 kg/m2                                                        GENERAL:  Comfortable, in no acute distress.                                 HEENT EXAM:  Nonicteric.  No " adenopathy.  Oropharynx is clear.               NECK:  Supple.                                                               LUNGS:  Clear.                                                               CARDIAC:  Regular rate and rhythm.  S1, S2.  No murmur.                      ABDOMEN:  Soft, positive bowel sounds, nontender.  No hepatosplenomegaly or masses.  No rebound or guarding.      EXTREMITIES:  No edema.     MENTAL STATUS:  Alert and oriented.    ASSESSMENT/PLAN:     Assessment: Anemia with occult positve stool.     Plan: Gastroscopy and Colonoscopy    Anesthesia Plan:   MAC / General Anaesthesia    ASA Grade: ASA 2 - Patient with mild systemic disease with no functional limitations    MALLAMPATI SCORE: II (hard and soft palate, upper portion of tonsils anduvula visible)

## 2017-02-17 NOTE — TRANSFER OF CARE
"Anesthesia Transfer of Care Note    Patient: Dulce Castro    Procedure(s) Performed: Procedure(s) (LRB):  ESOPHAGOGASTRODUODENOSCOPY (EGD) (N/A)  COLONOSCOPY (N/A)    Patient location: PACU    Anesthesia Type: general    Transport from OR: Transported from OR on room air with adequate spontaneous ventilation    Post pain: adequate analgesia    Post assessment: no apparent anesthetic complications and tolerated procedure well    Post vital signs: stable    Level of consciousness: awake and sedated    Nausea/Vomiting: no nausea/vomiting    Complications: none          Last vitals:   Visit Vitals    BP (!) 154/67 (BP Location: Right arm, Patient Position: Lying, BP Method: Automatic)    Pulse 82    Temp 37.1 °C (98.8 °F) (Skin)    Resp 16    Ht 5' 4.5" (1.638 m)    Wt 54.4 kg (120 lb)    LMP  (LMP Unknown)    SpO2 95%    Breastfeeding No    BMI 20.28 kg/m2     "

## 2017-02-17 NOTE — DISCHARGE INSTRUCTIONS
Procedural Sedation (Adult)  You have been given medicine by vein to make you sleep during your surgery. This may have included both a pain medicine and sleeping medicine. Most of the effects have worn off. But you may still have some drowsiness for the next 6 to 8 hours.  Home care  Follow these guidelines when you get home:  · For the next 8 hours, you should be watched by a responsible adult. This person should make sure your condition is not getting worse.  · Don't take any medicine by mouth for pain or for sleep during the next 4 hours. These might react with the medicines you were given in the hospital. This could cause a much stronger response than usual.  · Don't drink any alcohol for the next 24 hours.  · Don't drive, operate dangerous machinery, or make important business or personal decisions during the next 24 hours.  Follow-up care  Follow up with your healthcare provider if you are not alert and back to your usual level of activity within 12 hours.  When to seek medical advice  Call your healthcare provider right away if any of these occur:  · Drowsiness gets worse  · Weakness or dizziness gets worse  · Repeated vomiting  · You cannot be awakened   Date Last Reviewed: 10/18/2016  © 1093-2208 [employer]. [address] [place] [address]. All rights reserved. This information is not intended as a substitute for professional medical care. Always follow your healthcare professional's instructions.      PROBIOTICS:  Now that your colon is so cleaned out, now is a good time for a round of PROBIOTICS.  Eat a container of Greek Yogurt, such as OIKOS or CHOBANI,  Or Activia or Dannon    Greek Yogurt.    Or Take a similar Probiotic product such as Align or Culturelle or Zulma-Q, every day for a month.                  (The products listed are non-prescription, but you may need to ask the pharmacist for their location.)  Repeat this 4-6 times a year.        Tips to Control Acid  Reflux    To control acid reflux, youll need to make some basic diet and lifestyle changes. The simple steps outlined below may be all youll need to ease discomfort.  Watch what you eat  · Avoid fatty foods and spicy foods.  · Eat fewer acidic foods, such as citrus and tomato-based foods. These can increase symptoms.  · Limit drinking alcohol, caffeine, and fizzy beverages. All increase acid reflux.  · Try limiting chocolate, peppermint, and spearmint. These can worsen acid reflux in some people.  Watch when you eat  · Avoid lying down for 3 hours after eating.  · Do not snack before going to bed.  Raise your head  Raising your head and upper body by 4 to 6 inches helps limit reflux when youre lying down. Put blocks under the head of your bed frame to raise it.  Other changes  · Lose weight, if you need to  · Dont exercise near bedtime  · Avoid tight-fitting clothes  · Limit aspirin and ibuprofen  · Stop smoking   Date Last Reviewed: 7/1/2016  © 6740-1440 [employer]. [address] [place] [address], [place] [address]. All rights reserved. This information is not intended as a substitute for professional medical care. Always follow your healthcare professional's instructions.        High-Fiber Diet  Fiber is in fruits, vegetables, cereals, and grains. Fiber passes through your body undigested. A high-fiber diet helps food move through your intestinal tract. The added bulk is helpful in preventing constipation. In people with diverticulosis, fiber helps clean out the pouches along the colon wall. It also prevents new pouches from forming. A high-fiber diet reduces the risk of colon cancer. It also lowers blood cholesterol and prevents high blood sugar in people with diabetes.    The fiber-rich foods listed below should be part of your diet. If you are not used to high-fiber foods, start with 1 or 2 foods from this list. Every 3 to 4 days add a new one to your diet. Do this until you are eating 4 high-fiber  foods per day. This should give you 20 to 35 grams of fiber a day. It is also important to drink a lot of water when you are on this diet. You should have 6 to 8 glasses of water a day. Water makes the fiber swell and increases the benefit.  Foods high in dietary fiber  The following foods are high in dietary fiber:  · Breads. Breads made with 100% whole-wheat flour; jose, wheat, or rye crackers; whole-grain tortillas, bran muffins.  · Cereals. Whole-grain and bran cereals with bran (shredded wheat, wheat flakes, raisin bran, corn bran); oatmeal, rolled oats, granola, and brown rice.  · Fruits. Fresh fruits and their edible skins (pears, prunes, raisins, berries, apples, and apricots); bananas, citrus fruit, mangoes, pineapple; and prune juice.  · Nuts. Any nuts and seeds.  · Vegetables. Best served raw or lightly cooked. All types, especially: green peas, celery, eggplant, potatoes, spinach, broccoli, [place] sprouts, winter squash, carrots, cauliflower, soybeans, lentils, and fresh and dried beans of all kinds.  · Other. Popcorn, any spices.  Date Last Reviewed: 8/1/2016  © 6722-2974 [employer]. [address] [place] [address], [place] [address]. All rights reserved. This information is not intended as a substitute for professional medical care. Always follow your healthcare professional's instructions.

## 2017-02-17 NOTE — ANESTHESIA POSTPROCEDURE EVALUATION
"Anesthesia Post Evaluation    Patient: Dulce Castro    Procedure(s) Performed: Procedure(s) (LRB):  ESOPHAGOGASTRODUODENOSCOPY (EGD) (N/A)  COLONOSCOPY (N/A)    Final Anesthesia Type: general  Patient location during evaluation: PACU  Patient participation: Yes- Able to Participate  Level of consciousness: awake and alert and oriented  Post-procedure vital signs: reviewed and stable  Pain management: adequate  Airway patency: patent  PONV status at discharge: No PONV  Anesthetic complications: no      Cardiovascular status: hemodynamically stable  Respiratory status: unassisted, spontaneous ventilation and room air  Hydration status: euvolemic  Follow-up not needed.        Visit Vitals    BP (!) 140/65 (BP Location: Left arm, Patient Position: Lying, BP Method: Automatic)    Pulse 70    Temp 36.8 °C (98.2 °F)    Resp 15    Ht 5' 4.5" (1.638 m)    Wt 54.4 kg (120 lb)    LMP  (LMP Unknown)    SpO2 99%    Breastfeeding No    BMI 20.28 kg/m2       Pain/Nina Score: Pain Assessment Performed: Yes (2/17/2017 10:40 AM)  Presence of Pain: denies (2/17/2017 10:40 AM)  Nina Score: 10 (2/17/2017 10:40 AM)      "

## 2017-02-20 ENCOUNTER — LAB VISIT (OUTPATIENT)
Dept: LAB | Facility: HOSPITAL | Age: 72
End: 2017-02-20
Attending: INTERNAL MEDICINE
Payer: MEDICARE

## 2017-02-20 DIAGNOSIS — D50.0 IRON DEFICIENCY ANEMIA DUE TO CHRONIC BLOOD LOSS: ICD-10-CM

## 2017-02-20 DIAGNOSIS — F31.0 BIPOLAR AFFECTIVE DISORDER, CURRENT EPISODE HYPOMANIC: ICD-10-CM

## 2017-02-20 DIAGNOSIS — E53.8 B12 DEFICIENCY: ICD-10-CM

## 2017-02-20 DIAGNOSIS — Z95.0 PACEMAKER: ICD-10-CM

## 2017-02-20 DIAGNOSIS — Z98.890 S/P MVR (MITRAL VALVE REPAIR): ICD-10-CM

## 2017-02-20 DIAGNOSIS — I25.10 CORONARY ARTERY DISEASE INVOLVING NATIVE CORONARY ARTERY OF NATIVE HEART WITHOUT ANGINA PECTORIS: ICD-10-CM

## 2017-02-20 DIAGNOSIS — I27.20 PULMONARY HYPERTENSION: ICD-10-CM

## 2017-02-20 LAB
ALBUMIN SERPL BCP-MCNC: 4.2 G/DL
ALP SERPL-CCNC: 102 U/L
ALT SERPL W/O P-5'-P-CCNC: 60 U/L
ANION GAP SERPL CALC-SCNC: 11 MMOL/L
AST SERPL-CCNC: 40 U/L
BASOPHILS # BLD AUTO: 0.04 K/UL
BASOPHILS NFR BLD: 0.7 %
BILIRUB SERPL-MCNC: 0.5 MG/DL
BUN SERPL-MCNC: 21 MG/DL
CALCIUM SERPL-MCNC: 8.5 MG/DL
CHLORIDE SERPL-SCNC: 93 MMOL/L
CO2 SERPL-SCNC: 37 MMOL/L
CREAT SERPL-MCNC: 0.98 MG/DL
DIFFERENTIAL METHOD: ABNORMAL
DIRECT COOMBS (POLY/IGG): NORMAL
EOSINOPHIL # BLD AUTO: 0.1 K/UL
EOSINOPHIL NFR BLD: 1 %
ERYTHROCYTE [DISTWIDTH] IN BLOOD BY AUTOMATED COUNT: 12.2 %
EST. GFR  (AFRICAN AMERICAN): >60 ML/MIN/1.73 M^2
EST. GFR  (NON AFRICAN AMERICAN): 58 ML/MIN/1.73 M^2
GLUCOSE SERPL-MCNC: 139 MG/DL
HAPTOGLOB SERPL-MCNC: 140 MG/DL
HCT VFR BLD AUTO: 32.2 %
HGB BLD-MCNC: 10.5 G/DL
LDH SERPL L TO P-CCNC: 475 U/L
LYMPHOCYTES # BLD AUTO: 0.9 K/UL
LYMPHOCYTES NFR BLD: 16.1 %
MCH RBC QN AUTO: 31.6 PG
MCHC RBC AUTO-ENTMCNC: 32.6 %
MCV RBC AUTO: 97 FL
MONOCYTES # BLD AUTO: 0.6 K/UL
MONOCYTES NFR BLD: 9.5 %
NEUTROPHILS # BLD AUTO: 4.2 K/UL
NEUTROPHILS NFR BLD: 72.7 %
NRBC BLD-RTO: 0 /100 WBC
PLATELET # BLD AUTO: 173 K/UL
PMV BLD AUTO: 11.4 FL
POTASSIUM SERPL-SCNC: 3 MMOL/L
PROT SERPL-MCNC: 7.1 G/DL
RBC # BLD AUTO: 3.32 M/UL
SODIUM SERPL-SCNC: 141 MMOL/L
VIT B12 SERPL-MCNC: 990 PG/ML
WBC # BLD AUTO: 5.77 K/UL

## 2017-02-20 PROCEDURE — 82607 VITAMIN B-12: CPT

## 2017-02-20 PROCEDURE — 86880 COOMBS TEST DIRECT: CPT | Mod: PN

## 2017-02-20 PROCEDURE — 36415 COLL VENOUS BLD VENIPUNCTURE: CPT | Mod: PN

## 2017-02-20 PROCEDURE — 83615 LACTATE (LD) (LDH) ENZYME: CPT | Mod: PN

## 2017-02-20 PROCEDURE — 83010 ASSAY OF HAPTOGLOBIN QUANT: CPT

## 2017-02-20 PROCEDURE — 85025 COMPLETE CBC W/AUTO DIFF WBC: CPT | Mod: PN

## 2017-02-20 PROCEDURE — 82607 VITAMIN B-12: CPT | Mod: PN

## 2017-02-20 PROCEDURE — 80053 COMPREHEN METABOLIC PANEL: CPT | Mod: PN

## 2017-02-20 PROCEDURE — 80053 COMPREHEN METABOLIC PANEL: CPT

## 2017-02-20 PROCEDURE — 86880 COOMBS TEST DIRECT: CPT

## 2017-02-20 PROCEDURE — 85025 COMPLETE CBC W/AUTO DIFF WBC: CPT

## 2017-02-20 PROCEDURE — 83615 LACTATE (LD) (LDH) ENZYME: CPT

## 2017-02-23 ENCOUNTER — OFFICE VISIT (OUTPATIENT)
Dept: HEMATOLOGY/ONCOLOGY | Facility: CLINIC | Age: 72
End: 2017-02-23
Payer: MEDICARE

## 2017-02-23 VITALS
HEIGHT: 65 IN | BODY MASS INDEX: 20.01 KG/M2 | RESPIRATION RATE: 19 BRPM | WEIGHT: 120.13 LBS | DIASTOLIC BLOOD PRESSURE: 76 MMHG | HEART RATE: 82 BPM | SYSTOLIC BLOOD PRESSURE: 168 MMHG

## 2017-02-23 DIAGNOSIS — F31.11 BIPOLAR AFFECTIVE DISORDER, CURRENTLY MANIC, MILD: Primary | ICD-10-CM

## 2017-02-23 DIAGNOSIS — Z98.84 GASTRIC BYPASS STATUS FOR OBESITY: ICD-10-CM

## 2017-02-23 DIAGNOSIS — E53.8 B12 DEFICIENCY: ICD-10-CM

## 2017-02-23 DIAGNOSIS — Z98.890 S/P MVR (MITRAL VALVE REPAIR): ICD-10-CM

## 2017-02-23 DIAGNOSIS — R40.20 LOC (LOSS OF CONSCIOUSNESS): ICD-10-CM

## 2017-02-23 DIAGNOSIS — D50.0 IRON DEFICIENCY ANEMIA DUE TO CHRONIC BLOOD LOSS: ICD-10-CM

## 2017-02-23 DIAGNOSIS — S06.0X0A CONCUSSION WITHOUT LOSS OF CONSCIOUSNESS, INITIAL ENCOUNTER: ICD-10-CM

## 2017-02-23 PROCEDURE — 1160F RVW MEDS BY RX/DR IN RCRD: CPT | Mod: S$GLB,,, | Performed by: INTERNAL MEDICINE

## 2017-02-23 PROCEDURE — 1157F ADVNC CARE PLAN IN RCRD: CPT | Mod: S$GLB,,, | Performed by: INTERNAL MEDICINE

## 2017-02-23 PROCEDURE — 3078F DIAST BP <80 MM HG: CPT | Mod: S$GLB,,, | Performed by: INTERNAL MEDICINE

## 2017-02-23 PROCEDURE — 1159F MED LIST DOCD IN RCRD: CPT | Mod: S$GLB,,, | Performed by: INTERNAL MEDICINE

## 2017-02-23 PROCEDURE — 3077F SYST BP >= 140 MM HG: CPT | Mod: S$GLB,,, | Performed by: INTERNAL MEDICINE

## 2017-02-23 PROCEDURE — 1126F AMNT PAIN NOTED NONE PRSNT: CPT | Mod: S$GLB,,, | Performed by: INTERNAL MEDICINE

## 2017-02-23 PROCEDURE — 99499 UNLISTED E&M SERVICE: CPT | Mod: S$GLB,,, | Performed by: INTERNAL MEDICINE

## 2017-02-23 PROCEDURE — 99999 PR PBB SHADOW E&M-EST. PATIENT-LVL III: CPT | Mod: PBBFAC,,, | Performed by: INTERNAL MEDICINE

## 2017-02-23 PROCEDURE — 99213 OFFICE O/P EST LOW 20 MIN: CPT | Mod: S$GLB,,, | Performed by: INTERNAL MEDICINE

## 2017-02-23 NOTE — MR AVS SNAPSHOT
[address]  [facility] [address]  Phone: [phone]  Fax: [phone]                  Dulce Castro   2017 11:00 AM   Office Visit    Description:  Female : 1945   Provider:  Corrie Lee MD   Department:  [facility]                To Do List           Future Appointments        Provider Department Dept Phone    2017 11:00 AM Corrie Lee MD [facility] [phone]      Goals (5 Years of Data)     None      [facility]     [facility] -  Assistance  Registered nurses in the [facility] provide clinical advisement, health education, appointment booking, and other advisory services.  Call for this free service at [phone].             Medications           Message regarding Medications     Verify the changes and/or additions to your medication regime listed below are the same as discussed with your clinician today.  If any of these changes or additions are incorrect, please notify your healthcare provider.             Verify that the below list of medications is an accurate representation of the medications you are currently taking.  If none reported, the list may be blank. If incorrect, please contact your healthcare provider. Carry this list with you in case of emergency.           Current Medications     alprazolam (XANAX) 1 MG tablet Take 1 mg by mouth 4 (four) times daily as needed.    azelastine (ASTELIN) 137 mcg (0.1 %) nasal spray 1 spray (137 mcg total) by Nasal route 2 (two) times daily. 1-2 sprays each nare twice daily.    carbamazepine (TEGRETOL) 200 mg tablet Take 200 mg by mouth 2 (two) times daily.    diphenhydrAMINE (BENADRYL) 25 mg capsule Take 1 each (25 mg total) by mouth daily as needed for Itching.    fluticasone (FLONASE) 50 mcg/actuation nasal spray 2 sprays by Each Nare route once daily.    furosemide (LASIX) 40 MG tablet Take 1 tablet (40 mg  "total) by mouth Every 3 (three) days. Hold if sbp < 100    levothyroxine (SYNTHROID) 112 MCG tablet Take 1 tablet (112 mcg total) by mouth once daily.    metOLazone (ZAROXOLYN) 5 MG tablet Take 0.5 tablets (2.5 mg total) by mouth once a week. Hold if sbp < 100    OXYGEN-AIR DELIVERY SYSTEMS MISC 3 L by Nasal route Daily. uses hs and prn for SOB    potassium chloride SA (K-DUR,KLOR-CON) 20 MEQ tablet Take 1 tablet (20 mEq total) by mouth once daily. X 3 days    promethazine (PHENERGAN) 25 MG suppository Place 1 suppository (25 mg total) rectally every 6 (six) hours as needed for Nausea.    tizanidine (ZANAFLEX) 4 MG tablet 4 mg 2 (two) times daily as needed.     trazodone (DESYREL) 300 MG tablet Take 0.5 tablets (150 mg total) by mouth nightly as needed for Insomnia.    venlafaxine (EFFEXOR-XR) 75 MG 24 hr capsule 75 mg. Three tablets daily 225 mg           Clinical Reference Information           Your Vitals Were     BP Pulse Resp Height Weight Last Period    168/76 82 19 5' 4.5" (1.638 m) 54.5 kg (120 lb 2.4 oz) (LMP Unknown)    BMI                20.31 kg/m2          Blood Pressure          Most Recent Value    BP  (!)  168/76      Allergies as of 2/23/2017     Morphine    Tramadol      Immunizations Administered on Date of Encounter - 2/23/2017     None      [facility] Sign-Up     Activating your [facility] account is as easy as 1-2-3!     1) Visit [email or web address], select Sign Up Now, enter this activation code and your date of birth, then select Next.  [email or web address]  Expires: 4/3/2017 11:25 AM      2) Create a username and password to use when you visit [facility] in the future and select a security question in case you lose your password and select Next.    3) Enter your e-mail address and click Sign Up!    Additional Information  If you have questions, please e-mail [email or web address] or call [phone] to talk to our [facility] staff. Remember, [facility] is NOT to be used for urgent needs. For medical " emergencies, dial 911.         Language Assistance Services     ATTENTION: Language assistance services are available, free of charge. Please call [phone].      ATENCIÓN: Si habla osorio, tiene a chandler disposición servicios gratuitos de asistencia lingüística. Llame al [phone].     CHÚ Ý: N?u b?n nói Ti?ng Vi?t, có các d?ch v? h? tr? ngôn ng? mi?n phí dành cho b?n. G?i s? [phone].         [facility] complies with applicable Federal civil rights laws and does not discriminate on the basis of race, color, national origin, age, disability, or sex.

## 2017-02-23 NOTE — PROGRESS NOTES
Subjective:       Patient ID: Dulce Castro is a 71 y.o. female.    Chief Complaint:was receiving transfusion when she got hypotensive, taken to [facility], completed prbc in hospital. Was able to get one unit here the 1st day,   HPI:   A 70-year-old  woman, very well known to me. The patient has pulmonary  hypertension, coronary artery disease, anemia, neutropenia and had a history of  gastric bypass and bipolar disorder. She has been following clinically. She   states her pulmonary hypertension has stabilized. She follows with   pulmonologist, and she follows with Cardiology as well.lately the tegretol has been lowered. Not drinking, no other drugs. Feeling better now since dc from hospital 12/23, occult blood was positive, pt elected for op scopes  REVIEW OF SYSTEMS:   Has pacemaker  No CP occ palpitations, feels SOB on minial activity from pulmonary HTN, has occult blood positive at [facility]    has not noticed bleeding,   denies abd pain, nausea, vomiting, cough or sputum production   occ feet swelling  Not had a capsule study  PHYSICAL EXAM:     Vitals:    02/23/17 1043   BP: (!) 168/76   Pulse: 82   Resp: 19       GENERAL: Comfortable looking patient. Patient is in no distress.  Awake, alert and oriented to time, person and place.  No anxiety, or agitation.      HEENT: Normal conjunctivae and eyelids. WNL.  PERRLA 3 to 4 mm. No icterus, no pallor, no congestion, and no discharge noted.     NECK:  Supple. Trachea is central.  No crepitus.  No JVD or masses.    RESPIRATORY:  No intercostal retractions.  No dullness to percussion.  Chest is clear to auscultation.  No rales, rhonchi or wheezes.  No crepitus.  Good air entry bilaterally.    CARDIOVASCULAR:  S1 and S2 are normally heard without murmurs or gallops.  All peripheral pulses are present.    ABDOMEN:  Normal abdomen.  No hepatosplenomegaly.  No free fluid.  Bowel sounds are present.  No hernia noted. No masses.  No rebound or tenderness.  No guarding or  rigidity.  Umbilicus is midline.    LYMPHATICS:  No axillary, cervical, supraclavicular, submental, or inguinal lymphadenopathy.    SKIN/MUSCULOSKELETAL:  There is no evidence of excoriation marks or ecchmosis.  No rashes.  No cyanosis.  No clubbing.  No joint or skeletal deformities noted.  Normal range of motion.    NEUROLOGIC:  Higher functions are appropriate.  No cranial nerve deficits.  Normal sebastián.  Normal strength.  Motor and sensory functions are normal.  Deep tendon reflexes are normal.    GENITAL/RECTAL:  Exams are deferred.      Laboratory:     CBC:  Lab Results   Component Value Date    WBC 5.77 02/20/2017    RBC 3.32 (L) 02/20/2017    HGB 10.5 (L) 02/20/2017    HCT 32.2 (L) 02/20/2017    MCV 97 02/20/2017    MCH 31.6 (H) 02/20/2017    MCHC 32.6 02/20/2017    RDW 12.2 02/20/2017     02/20/2017    MPV 11.4 02/20/2017    GRAN 4.2 02/20/2017    GRAN 72.7 02/20/2017    LYMPH 0.9 (L) 02/20/2017    LYMPH 16.1 (L) 02/20/2017    MONO 0.6 02/20/2017    MONO 9.5 02/20/2017    EOS 0.1 02/20/2017    BASO 0.04 02/20/2017    EOSINOPHIL 1.0 02/20/2017    BASOPHIL 0.7 02/20/2017       BMP: BMP  Lab Results   Component Value Date     02/20/2017    K 3.0 (L) 02/20/2017    CL 93 (L) 02/20/2017    CO2 37 (H) 02/20/2017    BUN 21 (H) 02/20/2017    CREATININE 0.98 02/20/2017    CALCIUM 8.5 02/20/2017    ANIONGAP 11 02/20/2017    ESTGFRAFRICA >60 02/20/2017    EGFRNONAA 58 (A) 02/20/2017       LFT:   Lab Results   Component Value Date    ALT 60 (H) 02/20/2017    AST 40 (H) 02/20/2017    ALKPHOS 102 02/20/2017    BILITOT 0.5 02/20/2017     Lab Results   Component Value Date    IPUBXOXK66 990 (H) 02/20/2017     Haptoglobin less than 10  Assessment/Plan:       1. Bipolar affective disorder, currently manic, mild    2. LOC (loss of consciousness)    3. Concussion without loss of consciousness, initial encounter    4. Iron deficiency anemia due to chronic blood loss    5. S/P MVR (mitral valve repair)      hgb has  stabilized, may have had a component of hemolysis.   Vish neg haptoglobin has normalsied . Pt wants transfusion it made her feel much better will follow in 1 month and if worse will need to reinvestigate? Capsule studyHas appt with Dr. magana for scopes in feb.   Needs to take her b12 daily.  Cbc, cmp   hbg gradually decreasing

## 2017-02-24 ENCOUNTER — TELEPHONE (OUTPATIENT)
Dept: CARDIOLOGY | Facility: CLINIC | Age: 72
End: 2017-02-24

## 2017-03-02 RX ORDER — TIZANIDINE 4 MG/1
4 TABLET ORAL 2 TIMES DAILY PRN
Qty: 60 TABLET | Refills: 5 | Status: SHIPPED | OUTPATIENT
Start: 2017-03-02 | End: 2017-06-26 | Stop reason: ALTCHOICE

## 2017-03-02 NOTE — TELEPHONE ENCOUNTER
----- Message from Anirudh Plummer sent at 3/2/2017 10:25 AM CST -----  Contact: Dulce  Had pharmacy call already for refill for Rx Zanaflex 4 mg. Patient is completely out of medication.       [facility] [address]   [address] [facility] [address]  Phone: [phone] Fax: [phone]    No sure if she needs to come in first [phone]. Please call to let her know when it is sent so she can . Thanks!

## 2017-03-17 ENCOUNTER — LAB VISIT (OUTPATIENT)
Dept: LAB | Facility: HOSPITAL | Age: 72
End: 2017-03-17
Attending: INTERNAL MEDICINE
Payer: MEDICARE

## 2017-03-17 DIAGNOSIS — D50.0 IRON DEFICIENCY ANEMIA DUE TO CHRONIC BLOOD LOSS: ICD-10-CM

## 2017-03-17 DIAGNOSIS — Z98.890 S/P MVR (MITRAL VALVE REPAIR): ICD-10-CM

## 2017-03-17 LAB
ALBUMIN SERPL BCP-MCNC: 4.5 G/DL
ALP SERPL-CCNC: 124 U/L
ALT SERPL W/O P-5'-P-CCNC: 61 U/L
ANION GAP SERPL CALC-SCNC: 11 MMOL/L
AST SERPL-CCNC: 30 U/L
BASOPHILS # BLD AUTO: 0.05 K/UL
BASOPHILS NFR BLD: 1.1 %
BILIRUB SERPL-MCNC: 0.8 MG/DL
BUN SERPL-MCNC: 25 MG/DL
CALCIUM SERPL-MCNC: 8.6 MG/DL
CHLORIDE SERPL-SCNC: 90 MMOL/L
CO2 SERPL-SCNC: 38 MMOL/L
CREAT SERPL-MCNC: 1 MG/DL
DIFFERENTIAL METHOD: ABNORMAL
EOSINOPHIL # BLD AUTO: 0.1 K/UL
EOSINOPHIL NFR BLD: 2 %
ERYTHROCYTE [DISTWIDTH] IN BLOOD BY AUTOMATED COUNT: 11.4 %
EST. GFR  (AFRICAN AMERICAN): >60 ML/MIN/1.73 M^2
EST. GFR  (NON AFRICAN AMERICAN): 56 ML/MIN/1.73 M^2
FERRITIN SERPL-MCNC: 184 NG/ML
GLUCOSE SERPL-MCNC: 96 MG/DL
HCT VFR BLD AUTO: 34.7 %
HGB BLD-MCNC: 11.3 G/DL
IRON SATN MFR SERPL: 41 %
IRON SERPL-MCNC: 124 UG/DL
LYMPHOCYTES # BLD AUTO: 1.1 K/UL
LYMPHOCYTES NFR BLD: 25.3 %
MCH RBC QN AUTO: 31.1 PG
MCHC RBC AUTO-ENTMCNC: 32.6 %
MCV RBC AUTO: 96 FL
MONOCYTES # BLD AUTO: 0.5 K/UL
MONOCYTES NFR BLD: 11.1 %
NEUTROPHILS # BLD AUTO: 2.7 K/UL
NEUTROPHILS NFR BLD: 60.5 %
NRBC BLD-RTO: 0 /100 WBC
PLATELET # BLD AUTO: 205 K/UL
PMV BLD AUTO: 10.8 FL
POTASSIUM SERPL-SCNC: 3.1 MMOL/L
PROT SERPL-MCNC: 7.6 G/DL
RBC # BLD AUTO: 3.63 M/UL
SODIUM SERPL-SCNC: 139 MMOL/L
TOTAL IRON BINDING CAPACITY: 299 UG/DL
WBC # BLD AUTO: 4.43 K/UL

## 2017-03-17 PROCEDURE — 82728 ASSAY OF FERRITIN: CPT | Mod: PN

## 2017-03-17 PROCEDURE — 85025 COMPLETE CBC W/AUTO DIFF WBC: CPT | Mod: PN

## 2017-03-17 PROCEDURE — 84238 ASSAY NONENDOCRINE RECEPTOR: CPT

## 2017-03-17 PROCEDURE — 80053 COMPREHEN METABOLIC PANEL: CPT

## 2017-03-17 PROCEDURE — 80053 COMPREHEN METABOLIC PANEL: CPT | Mod: PN

## 2017-03-17 PROCEDURE — 83540 ASSAY OF IRON: CPT | Mod: PN

## 2017-03-17 PROCEDURE — 83540 ASSAY OF IRON: CPT

## 2017-03-17 PROCEDURE — 85025 COMPLETE CBC W/AUTO DIFF WBC: CPT

## 2017-03-17 PROCEDURE — 82728 ASSAY OF FERRITIN: CPT

## 2017-03-20 LAB — STFR SERPL-MCNC: 3 MG/L

## 2017-03-23 ENCOUNTER — OFFICE VISIT (OUTPATIENT)
Dept: HEMATOLOGY/ONCOLOGY | Facility: CLINIC | Age: 72
End: 2017-03-23
Payer: MEDICARE

## 2017-03-23 VITALS
TEMPERATURE: 99 F | SYSTOLIC BLOOD PRESSURE: 132 MMHG | WEIGHT: 120.38 LBS | DIASTOLIC BLOOD PRESSURE: 60 MMHG | BODY MASS INDEX: 20.55 KG/M2 | RESPIRATION RATE: 18 BRPM | HEIGHT: 64 IN | HEART RATE: 66 BPM

## 2017-03-23 DIAGNOSIS — F31.0 BIPOLAR AFFECTIVE DISORDER, CURRENT EPISODE HYPOMANIC: Primary | ICD-10-CM

## 2017-03-23 DIAGNOSIS — D55.9 ANEMIA DUE TO ENZYME DISORDER: ICD-10-CM

## 2017-03-23 DIAGNOSIS — E53.8 B12 DEFICIENCY: ICD-10-CM

## 2017-03-23 DIAGNOSIS — I25.10 CORONARY ARTERY DISEASE INVOLVING NATIVE CORONARY ARTERY OF NATIVE HEART WITHOUT ANGINA PECTORIS: ICD-10-CM

## 2017-03-23 DIAGNOSIS — D50.9 IRON DEFICIENCY ANEMIA, UNSPECIFIED IRON DEFICIENCY ANEMIA TYPE: Primary | ICD-10-CM

## 2017-03-23 PROCEDURE — 3075F SYST BP GE 130 - 139MM HG: CPT | Mod: S$GLB,,, | Performed by: INTERNAL MEDICINE

## 2017-03-23 PROCEDURE — 1126F AMNT PAIN NOTED NONE PRSNT: CPT | Mod: S$GLB,,, | Performed by: INTERNAL MEDICINE

## 2017-03-23 PROCEDURE — 99213 OFFICE O/P EST LOW 20 MIN: CPT | Mod: S$GLB,,, | Performed by: INTERNAL MEDICINE

## 2017-03-23 PROCEDURE — 3078F DIAST BP <80 MM HG: CPT | Mod: S$GLB,,, | Performed by: INTERNAL MEDICINE

## 2017-03-23 PROCEDURE — 1157F ADVNC CARE PLAN IN RCRD: CPT | Mod: S$GLB,,, | Performed by: INTERNAL MEDICINE

## 2017-03-23 PROCEDURE — 99999 PR PBB SHADOW E&M-EST. PATIENT-LVL III: CPT | Mod: PBBFAC,,, | Performed by: INTERNAL MEDICINE

## 2017-03-23 PROCEDURE — 99499 UNLISTED E&M SERVICE: CPT | Mod: S$GLB,,, | Performed by: INTERNAL MEDICINE

## 2017-03-23 PROCEDURE — 1159F MED LIST DOCD IN RCRD: CPT | Mod: S$GLB,,, | Performed by: INTERNAL MEDICINE

## 2017-03-23 PROCEDURE — 1160F RVW MEDS BY RX/DR IN RCRD: CPT | Mod: S$GLB,,, | Performed by: INTERNAL MEDICINE

## 2017-03-23 NOTE — PROGRESS NOTES
Subjective:       Patient ID: Dulce Castro is a 71 y.o. female.    Chief Complaint:was receiving transfusion when she got hypotensive, taken to [facility], completed prbc in hospital. Was able to get one unit here the 1st day,   HPI:   A 70-year-old  woman, very well known to me. The patient has pulmonary  hypertension, coronary artery disease, anemia, neutropenia and had a history of  gastric bypass and bipolar disorder. She has been following clinically. She   states her pulmonary hypertension has stabilized. She follows with   pulmonologist, and she follows with Cardiology as well.lately the tegretol has been lowered. Not drinking, no other drugs. Feeling better now since dc from hospital 12/23, occult blood was positive, pt elected for op scopes  REVIEW OF SYSTEMS:   Has pacemaker  No CP occ palpitations, feels SOB on minial activity from pulmonary HTN, has [facility] blood positive at [facility]    has not noticed bleeding,   denies abd pain, nausea, vomiting, cough or sputum production   occ feet swelling  Not had a capsule study  PHYSICAL EXAM:     Vitals:    03/23/17 1342   BP: 132/60   Pulse: 66   Resp: 18   Temp: 98.7 °F (37.1 °C)       GENERAL: Comfortable looking patient. Patient is in no distress.  Awake, alert and oriented to time, person and place.  No anxiety, or agitation.      HEENT: Normal conjunctivae and eyelids. WNL.  PERRLA 3 to 4 mm. No icterus, no pallor, no congestion, and no discharge noted.     NECK:  Supple. Trachea is central.  No crepitus.  No JVD or masses.    RESPIRATORY:  No intercostal retractions.  No dullness to percussion.  Chest is clear to auscultation.  No rales, rhonchi or wheezes.  No crepitus.  Good air entry bilaterally.    CARDIOVASCULAR:  S1 and S2 are normally heard without murmurs or gallops.  All peripheral pulses are present.    ABDOMEN:  Normal abdomen.  No hepatosplenomegaly.  No free fluid.  Bowel sounds are present.  No hernia noted. No masses.  No rebound or  tenderness.  No guarding or rigidity.  Umbilicus is midline.    LYMPHATICS:  No axillary, cervical, supraclavicular, submental, or inguinal lymphadenopathy.    SKIN/MUSCULOSKELETAL:  There is no evidence of excoriation marks or ecchmosis.  No rashes.  No cyanosis.  No clubbing.  No joint or skeletal deformities noted.  Normal range of motion.    NEUROLOGIC:  Higher functions are appropriate.  No cranial nerve deficits.  Normal sebastián.  Normal strength.  Motor and sensory functions are normal.  Deep tendon reflexes are normal.    GENITAL/RECTAL:  Exams are deferred.      Laboratory:     CBC:  Lab Results   Component Value Date    WBC 4.43 03/17/2017    RBC 3.63 (L) 03/17/2017    HGB 11.3 (L) 03/17/2017    HCT 34.7 (L) 03/17/2017    MCV 96 03/17/2017    MCH 31.1 (H) 03/17/2017    MCHC 32.6 03/17/2017    RDW 11.4 (L) 03/17/2017     03/17/2017    MPV 10.8 03/17/2017    GRAN 2.7 03/17/2017    GRAN 60.5 03/17/2017    LYMPH 1.1 03/17/2017    LYMPH 25.3 03/17/2017    MONO 0.5 03/17/2017    MONO 11.1 03/17/2017    EOS 0.1 03/17/2017    BASO 0.05 03/17/2017    EOSINOPHIL 2.0 03/17/2017    BASOPHIL 1.1 03/17/2017       BMP: BMP  Lab Results   Component Value Date     03/17/2017    K 3.1 (L) 03/17/2017    CL 90 (L) 03/17/2017    CO2 38 (H) 03/17/2017    BUN 25 (H) 03/17/2017    CREATININE 1.00 03/17/2017    CALCIUM 8.6 03/17/2017    ANIONGAP 11 03/17/2017    ESTGFRAFRICA >60 03/17/2017    EGFRNONAA 56 (A) 03/17/2017       LFT:   Lab Results   Component Value Date    ALT 61 (H) 03/17/2017    AST 30 03/17/2017    ALKPHOS 124 03/17/2017    BILITOT 0.8 03/17/2017     Lab Results   Component Value Date    [id number] 990 (H) 02/20/2017     Haptoglobin was  less than 10 now has normalized haptoglobin 140, b12 990  Assessment/Plan:   hgb has stabilized, may have had a component of hemolysis.   Vish neg haptoglobin has normalsied .   Cbc, cmp   hbg much better   rtc 2 months with cbc, cmp,

## 2017-04-04 ENCOUNTER — HOSPITAL ENCOUNTER (OUTPATIENT)
Dept: RADIOLOGY | Facility: HOSPITAL | Age: 72
Discharge: HOME | End: 2017-04-04
Attending: FAMILY MEDICINE
Payer: MEDICARE

## 2017-04-04 ENCOUNTER — CLINICAL SUPPORT (OUTPATIENT)
Dept: CARDIOLOGY | Facility: CLINIC | Age: 72
End: 2017-04-04
Payer: MEDICARE

## 2017-04-04 DIAGNOSIS — R00.1 BRADYCARDIA: ICD-10-CM

## 2017-04-04 DIAGNOSIS — I25.10 CORONARY ARTERY DISEASE INVOLVING NATIVE CORONARY ARTERY OF NATIVE HEART WITHOUT ANGINA PECTORIS: ICD-10-CM

## 2017-04-04 DIAGNOSIS — I27.20 PULMONARY HYPERTENSION: ICD-10-CM

## 2017-04-04 DIAGNOSIS — Z98.890 S/P MVR (MITRAL VALVE REPAIR): ICD-10-CM

## 2017-04-04 DIAGNOSIS — Z95.0 PACEMAKER: ICD-10-CM

## 2017-04-04 DIAGNOSIS — I95.1 ORTHOSTATIC HYPOTENSION: ICD-10-CM

## 2017-04-04 PROCEDURE — 93015 CV STRESS TEST SUPVJ I&R: CPT | Mod: S$GLB,,, | Performed by: INTERNAL MEDICINE

## 2017-04-04 PROCEDURE — 78452 HT MUSCLE IMAGE SPECT MULT: CPT | Mod: 26,,, | Performed by: INTERNAL MEDICINE

## 2017-04-05 LAB — DIASTOLIC DYSFUNCTION: NO

## 2017-04-12 ENCOUNTER — TELEPHONE (OUTPATIENT)
Dept: CARDIOLOGY | Facility: CLINIC | Age: 72
End: 2017-04-12

## 2017-04-19 ENCOUNTER — LAB VISIT (OUTPATIENT)
Dept: LAB | Facility: HOSPITAL | Age: 72
End: 2017-04-19
Attending: FAMILY MEDICINE
Payer: MEDICARE

## 2017-04-19 ENCOUNTER — OFFICE VISIT (OUTPATIENT)
Dept: CARDIOLOGY | Facility: CLINIC | Age: 72
End: 2017-04-19
Payer: MEDICARE

## 2017-04-19 VITALS
BODY MASS INDEX: 20.13 KG/M2 | WEIGHT: 120.81 LBS | DIASTOLIC BLOOD PRESSURE: 65 MMHG | HEIGHT: 65 IN | SYSTOLIC BLOOD PRESSURE: 144 MMHG | HEART RATE: 76 BPM

## 2017-04-19 DIAGNOSIS — F06.34 BIPOLAR AND RELATED DISORDER DUE TO ANOTHER MEDICAL CONDITION WITH MIXED FEATURES: Primary | ICD-10-CM

## 2017-04-19 DIAGNOSIS — I27.20 PULMONARY HYPERTENSION: ICD-10-CM

## 2017-04-19 DIAGNOSIS — Z98.890 H/O MITRAL VALVE REPAIR: ICD-10-CM

## 2017-04-19 DIAGNOSIS — I49.5 SSS (SICK SINUS SYNDROME): ICD-10-CM

## 2017-04-19 DIAGNOSIS — I25.10 CAD IN NATIVE ARTERY: Primary | ICD-10-CM

## 2017-04-19 LAB — CARBAMAZEPINE SERPL-MCNC: 6 UG/ML

## 2017-04-19 PROCEDURE — 99213 OFFICE O/P EST LOW 20 MIN: CPT | Mod: S$GLB,,, | Performed by: INTERNAL MEDICINE

## 2017-04-19 PROCEDURE — 3077F SYST BP >= 140 MM HG: CPT | Mod: S$GLB,,, | Performed by: INTERNAL MEDICINE

## 2017-04-19 PROCEDURE — 99999 PR PBB SHADOW E&M-EST. PATIENT-LVL III: CPT | Mod: PBBFAC,,, | Performed by: INTERNAL MEDICINE

## 2017-04-19 PROCEDURE — 3078F DIAST BP <80 MM HG: CPT | Mod: S$GLB,,, | Performed by: INTERNAL MEDICINE

## 2017-04-19 PROCEDURE — 99499 UNLISTED E&M SERVICE: CPT | Mod: S$GLB,,, | Performed by: PHYSICIAN ASSISTANT

## 2017-04-19 PROCEDURE — 1126F AMNT PAIN NOTED NONE PRSNT: CPT | Mod: S$GLB,,, | Performed by: INTERNAL MEDICINE

## 2017-04-19 PROCEDURE — 36415 COLL VENOUS BLD VENIPUNCTURE: CPT | Mod: PO

## 2017-04-19 PROCEDURE — 1160F RVW MEDS BY RX/DR IN RCRD: CPT | Mod: S$GLB,,, | Performed by: INTERNAL MEDICINE

## 2017-04-19 PROCEDURE — 80156 ASSAY CARBAMAZEPINE TOTAL: CPT

## 2017-04-19 PROCEDURE — 1159F MED LIST DOCD IN RCRD: CPT | Mod: S$GLB,,, | Performed by: INTERNAL MEDICINE

## 2017-04-20 NOTE — PROGRESS NOTES
Subjective:    Patient ID:  Dulce Castro is a 71 y.o. female who presents for follow up and Test Results    HPI:  70 yo F with PMH of CAD, SSS, Pulmonary HTN, and MV Repair presents for follow up and test results.  Pt states she has noted increased fatigue.  Also c/o occasional SOB and occasional palpitations.  Overall much improved from 12/16 admission.  Reviewed Stress Test-- Negative for Ischemia.    Review of Systems   Constitution: Positive for malaise/fatigue.   Cardiovascular: Positive for palpitations. Negative for chest pain and leg swelling.   Respiratory: Positive for shortness of breath.         Objective:    Physical Exam   Constitutional: She is oriented to person, place, and time. She appears well-developed and well-nourished. No distress.   HENT:   Head: Normocephalic and atraumatic.   Right Ear: External ear normal.   Left Ear: External ear normal.   Nose: Nose normal.   Mouth/Throat: Oropharynx is clear and moist.   Eyes: Conjunctivae and EOM are normal. Pupils are equal, round, and reactive to light. Right eye exhibits no discharge. Left eye exhibits no discharge. No scleral icterus.   Neck: Normal range of motion. Neck supple. No JVD present. No tracheal deviation present.   Cardiovascular: Normal rate, regular rhythm, normal heart sounds and intact distal pulses.  Exam reveals no gallop and no friction rub.    No murmur heard.  Pulmonary/Chest: Effort normal and breath sounds normal. No respiratory distress. She has no wheezes. She has no rales. She exhibits no tenderness.   Abdominal: Soft. Bowel sounds are normal. She exhibits no distension. There is no tenderness. There is no rebound and no guarding.   Musculoskeletal: Normal range of motion. She exhibits no edema, tenderness or deformity.   Neurological: She is alert and oriented to person, place, and time.   Skin: Skin is warm and dry. She is not diaphoretic.   Psychiatric: She has a normal mood and affect. Her behavior is normal.          Assessment:       1. CAD in native artery    2. SSS (sick sinus syndrome)    3. Pulmonary hypertension    4. H/O mitral valve repair         Plan:       1.  Nuclear Stress negative for ischemia.  No current meds.  H/o anemia and frequent falls with concussion-- no ASA.  H/o hypotension-- may tolerate a low dose beta blocker or ACE-I.  Abnormal LFTs-- no statin.  2.  PPM in place.  Last check 1/17/17-- WNL  3.  On Lasix and Zaroxolyn.  F/u Echo  4.  F/u Echo  5.  Return in 2 months

## 2017-05-01 ENCOUNTER — CLINICAL SUPPORT (OUTPATIENT)
Dept: CARDIOLOGY | Facility: CLINIC | Age: 72
End: 2017-05-01
Payer: MEDICARE

## 2017-05-01 DIAGNOSIS — Z95.0 CARDIAC PACEMAKER IN SITU: ICD-10-CM

## 2017-05-01 DIAGNOSIS — R00.1 BRADYCARDIA: ICD-10-CM

## 2017-05-01 PROCEDURE — 93296 REM INTERROG EVL PM/IDS: CPT | Mod: S$GLB,,, | Performed by: INTERNAL MEDICINE

## 2017-05-01 PROCEDURE — 93294 REM INTERROG EVL PM/LDLS PM: CPT | Mod: S$GLB,,, | Performed by: INTERNAL MEDICINE

## 2017-05-23 ENCOUNTER — OFFICE VISIT (OUTPATIENT)
Dept: HEMATOLOGY/ONCOLOGY | Facility: CLINIC | Age: 72
End: 2017-05-23
Payer: MEDICARE

## 2017-05-23 ENCOUNTER — LAB VISIT (OUTPATIENT)
Dept: LAB | Facility: HOSPITAL | Age: 72
End: 2017-05-23
Attending: INTERNAL MEDICINE
Payer: MEDICARE

## 2017-05-23 VITALS
BODY MASS INDEX: 20.74 KG/M2 | SYSTOLIC BLOOD PRESSURE: 139 MMHG | DIASTOLIC BLOOD PRESSURE: 73 MMHG | HEART RATE: 73 BPM | RESPIRATION RATE: 18 BRPM | WEIGHT: 121.5 LBS | HEIGHT: 64 IN

## 2017-05-23 DIAGNOSIS — F31.0 BIPOLAR AFFECTIVE DISORDER, CURRENT EPISODE HYPOMANIC: Primary | ICD-10-CM

## 2017-05-23 DIAGNOSIS — D50.9 IRON DEFICIENCY ANEMIA, UNSPECIFIED IRON DEFICIENCY ANEMIA TYPE: ICD-10-CM

## 2017-05-23 DIAGNOSIS — I25.10 CORONARY ARTERY DISEASE INVOLVING NATIVE CORONARY ARTERY OF NATIVE HEART WITHOUT ANGINA PECTORIS: ICD-10-CM

## 2017-05-23 DIAGNOSIS — Z98.84 GASTRIC BYPASS STATUS FOR OBESITY: ICD-10-CM

## 2017-05-23 DIAGNOSIS — E53.8 B12 DEFICIENCY: ICD-10-CM

## 2017-05-23 DIAGNOSIS — D50.0 IRON DEFICIENCY ANEMIA DUE TO CHRONIC BLOOD LOSS: ICD-10-CM

## 2017-05-23 LAB
ALBUMIN SERPL BCP-MCNC: 4 G/DL
ALP SERPL-CCNC: 84 U/L
ALT SERPL W/O P-5'-P-CCNC: 25 U/L
ANION GAP SERPL CALC-SCNC: 14 MMOL/L
AST SERPL-CCNC: 26 U/L
BASOPHILS # BLD AUTO: 0.03 K/UL
BASOPHILS NFR BLD: 0.4 %
BILIRUB SERPL-MCNC: 0.3 MG/DL
BUN SERPL-MCNC: 20 MG/DL
CALCIUM SERPL-MCNC: 9.1 MG/DL
CHLORIDE SERPL-SCNC: 96 MMOL/L
CO2 SERPL-SCNC: 30 MMOL/L
CREAT SERPL-MCNC: 1 MG/DL
DIFFERENTIAL METHOD: ABNORMAL
EOSINOPHIL # BLD AUTO: 0.1 K/UL
EOSINOPHIL NFR BLD: 0.9 %
ERYTHROCYTE [DISTWIDTH] IN BLOOD BY AUTOMATED COUNT: 11.6 %
EST. GFR  (AFRICAN AMERICAN): >60 ML/MIN/1.73 M^2
EST. GFR  (NON AFRICAN AMERICAN): 56 ML/MIN/1.73 M^2
GLUCOSE SERPL-MCNC: 89 MG/DL
HCT VFR BLD AUTO: 32.4 %
HGB BLD-MCNC: 11 G/DL
LYMPHOCYTES # BLD AUTO: 1.3 K/UL
LYMPHOCYTES NFR BLD: 16.2 %
MCH RBC QN AUTO: 31.4 PG
MCHC RBC AUTO-ENTMCNC: 34 %
MCV RBC AUTO: 93 FL
MONOCYTES # BLD AUTO: 0.9 K/UL
MONOCYTES NFR BLD: 10.4 %
NEUTROPHILS # BLD AUTO: 5.9 K/UL
NEUTROPHILS NFR BLD: 72.1 %
PLATELET # BLD AUTO: 186 K/UL
PMV BLD AUTO: 10.1 FL
POTASSIUM SERPL-SCNC: 3.5 MMOL/L
PROT SERPL-MCNC: 7.1 G/DL
RBC # BLD AUTO: 3.5 M/UL
SODIUM SERPL-SCNC: 140 MMOL/L
WBC # BLD AUTO: 8.15 K/UL

## 2017-05-23 PROCEDURE — 3075F SYST BP GE 130 - 139MM HG: CPT | Mod: S$GLB,,, | Performed by: INTERNAL MEDICINE

## 2017-05-23 PROCEDURE — 3078F DIAST BP <80 MM HG: CPT | Mod: S$GLB,,, | Performed by: INTERNAL MEDICINE

## 2017-05-23 PROCEDURE — 99214 OFFICE O/P EST MOD 30 MIN: CPT | Mod: S$GLB,,, | Performed by: INTERNAL MEDICINE

## 2017-05-23 PROCEDURE — 1160F RVW MEDS BY RX/DR IN RCRD: CPT | Mod: S$GLB,,, | Performed by: INTERNAL MEDICINE

## 2017-05-23 PROCEDURE — 1157F ADVNC CARE PLAN IN RCRD: CPT | Mod: S$GLB,,, | Performed by: INTERNAL MEDICINE

## 2017-05-23 PROCEDURE — 99999 PR PBB SHADOW E&M-EST. PATIENT-LVL III: CPT | Mod: PBBFAC,,, | Performed by: INTERNAL MEDICINE

## 2017-05-23 PROCEDURE — 99499 UNLISTED E&M SERVICE: CPT | Mod: S$GLB,,, | Performed by: INTERNAL MEDICINE

## 2017-05-23 PROCEDURE — 1159F MED LIST DOCD IN RCRD: CPT | Mod: S$GLB,,, | Performed by: INTERNAL MEDICINE

## 2017-05-23 PROCEDURE — 1125F AMNT PAIN NOTED PAIN PRSNT: CPT | Mod: S$GLB,,, | Performed by: INTERNAL MEDICINE

## 2017-05-23 NOTE — PROGRESS NOTES
Subjective:       Patient ID: Dulce Castro is a 71 y.o. female.    Chief Complaint:was receiving transfusion when she got hypotensive, taken to [facility], completed prbc in hospital. Was able to get one unit here the 1st day,   HPI:   A 70-year-old  woman, very well known to me. The patient has pulmonary  hypertension, coronary artery disease, anemia, neutropenia and had a history of  gastric bypass and bipolar disorder. She has been following clinically. She   states her pulmonary hypertension has stabilized. She follows with   pulmonologist, and she follows with Cardiology as well.lately the tegretol has been lowered. Not drinking, no other drugs. Feeling better now since dc from hospital 12/23, occult blood was positive, pt elected for op scopes  REVIEW OF SYSTEMS:   Has pacemaker  No CP occ palpitations, feels SOB on minial activity from pulmonary HTN, has occult blood positive at [facility]    has not noticed bleeding,c/o headaches chronically, feels tired all the time   denies abd pain, nausea, vomiting, cough or sputum production   occ feet swelling  Not had a capsule study  PHYSICAL EXAM:     Wt Readings from Last 3 Encounters:   05/23/17 55.1 kg (121 lb 7.6 oz)   04/19/17 54.8 kg (120 lb 13 oz)   03/23/17 54.6 kg (120 lb 5.9 oz)     Temp Readings from Last 3 Encounters:   03/23/17 98.7 °F (37.1 °C)   02/17/17 98.2 °F (36.8 °C)   01/12/17 99.2 °F (37.3 °C)     BP Readings from Last 3 Encounters:   05/23/17 139/73   04/19/17 (!) 144/65   03/23/17 132/60     Pulse Readings from Last 3 Encounters:   05/23/17 73   04/19/17 76   03/23/17 66     GENERAL: Comfortable looking patient. Patient is in no distress.  Awake, alert and oriented to time, person and place.  No anxiety, or agitation.      HEENT: Normal conjunctivae and eyelids. WNL.  PERRLA 3 to 4 mm. No icterus, no pallor, no congestion, and no discharge noted.     NECK:  Supple. Trachea is central.  No crepitus.  No JVD or masses.    RESPIRATORY:  No  intercostal retractions.  No dullness to percussion.  Chest is clear to auscultation.  No rales, rhonchi or wheezes.  No crepitus.  Good air entry bilaterally.    CARDIOVASCULAR:  S1 and S2 are normally heard without murmurs or gallops.  All peripheral pulses are present.    ABDOMEN:  Normal abdomen.  No hepatosplenomegaly.  No free fluid.  Bowel sounds are present.  No hernia noted. No masses.  No rebound or tenderness.  No guarding or rigidity.  Umbilicus is midline.    LYMPHATICS:  No axillary, cervical, supraclavicular, submental, or inguinal lymphadenopathy.    SKIN/MUSCULOSKELETAL:  There is no evidence of excoriation marks or ecchmosis.  No rashes.  No cyanosis.  No clubbing.  No joint or skeletal deformities noted.  Normal range of motion.    NEUROLOGIC:  Higher functions are appropriate.  No cranial nerve deficits.  Normal sebastián.  Normal strength.  Motor and sensory functions are normal.  Deep tendon reflexes are normal.    GENITAL/RECTAL:  Exams are deferred.      Laboratory:     CBC:  Lab Results   Component Value Date    WBC 8.15 05/23/2017    RBC 3.50 (L) 05/23/2017    HGB 11.0 (L) 05/23/2017    HCT 32.4 (L) 05/23/2017    MCV 93 05/23/2017    MCH 31.4 (H) 05/23/2017    MCHC 34.0 05/23/2017    RDW 11.6 05/23/2017     05/23/2017    MPV 10.1 05/23/2017    GRAN 5.9 05/23/2017    GRAN 72.1 05/23/2017    LYMPH 1.3 05/23/2017    LYMPH 16.2 (L) 05/23/2017    MONO 0.9 05/23/2017    MONO 10.4 05/23/2017    EOS 0.1 05/23/2017    BASO 0.03 05/23/2017    EOSINOPHIL 0.9 05/23/2017    BASOPHIL 0.4 05/23/2017       BMP: BMP  Lab Results   Component Value Date     03/17/2017    K 3.1 (L) 03/17/2017    CL 90 (L) 03/17/2017    CO2 38 (H) 03/17/2017    BUN 25 (H) 03/17/2017    CREATININE 1.00 03/17/2017    CALCIUM 8.6 03/17/2017    ANIONGAP 11 03/17/2017    ESTGFRAFRICA >60 03/17/2017    EGFRNONAA 56 (A) 03/17/2017       LFT:   Lab Results   Component Value Date    ALT 61 (H) 03/17/2017    AST 30 03/17/2017     ALKPHOS 124 03/17/2017    BILITOT 0.8 03/17/2017     Lab Results   Component Value Date    ROUJRERK83 990 (H) 02/20/2017     Haptoglobin was low  Assessment/Plan:   hgb has stabilized, may have had a component of hemolysis.   Vish neg haptoglobin has normalsied .   Cbc, cmp   hbg much better   rtc 2 months with cbc, cmp,    bipolar dz: well controlled, chronic hradaches? Hypoxic, vascular, s/e of meds will refer to neuro

## 2017-05-24 DIAGNOSIS — R51.9 NONINTRACTABLE HEADACHE, UNSPECIFIED CHRONICITY PATTERN, UNSPECIFIED HEADACHE TYPE: Primary | ICD-10-CM

## 2017-05-25 ENCOUNTER — TELEPHONE (OUTPATIENT)
Dept: NEUROLOGY | Facility: CLINIC | Age: 72
End: 2017-05-25

## 2017-05-25 NOTE — TELEPHONE ENCOUNTER
Appointment scheduled for 9-11-17 to see Dr. Nelson for headaches, mailed, patient indicated understanding.

## 2017-05-25 NOTE — TELEPHONE ENCOUNTER
----- Message from Jinny Zavala LPN sent at 5/24/2017 12:45 PM CDT -----  Please call and schedule patient

## 2017-06-05 ENCOUNTER — CLINICAL SUPPORT (OUTPATIENT)
Dept: CARDIOLOGY | Facility: CLINIC | Age: 72
End: 2017-06-05
Payer: MEDICARE

## 2017-06-05 DIAGNOSIS — I25.10 CAD IN NATIVE ARTERY: ICD-10-CM

## 2017-06-05 PROCEDURE — 93306 TTE W/DOPPLER COMPLETE: CPT | Mod: S$GLB,,, | Performed by: INTERNAL MEDICINE

## 2017-06-06 LAB
AORTIC VALVE REGURGITATION: ABNORMAL
AORTIC VALVE STENOSIS: ABNORMAL
ESTIMATED PA SYSTOLIC PRESSURE: 43.2
MITRAL VALVE MOBILITY: NORMAL
MITRAL VALVE REGURGITATION: ABNORMAL
RETIRED EF AND QEF - SEE NOTES: 55 (ref 55–65)
TRICUSPID VALVE REGURGITATION: ABNORMAL

## 2017-06-15 DIAGNOSIS — E03.4 HYPOTHYROIDISM DUE TO ACQUIRED ATROPHY OF THYROID: ICD-10-CM

## 2017-06-17 RX ORDER — LEVOTHYROXINE SODIUM 112 UG/1
112 TABLET ORAL DAILY
Qty: 30 TABLET | Refills: 11 | Status: SHIPPED | OUTPATIENT
Start: 2017-06-17 | End: 2018-06-07 | Stop reason: SDUPTHER

## 2017-06-20 ENCOUNTER — OFFICE VISIT (OUTPATIENT)
Dept: CARDIOLOGY | Facility: CLINIC | Age: 72
End: 2017-06-20
Payer: MEDICARE

## 2017-06-20 VITALS
BODY MASS INDEX: 20.58 KG/M2 | DIASTOLIC BLOOD PRESSURE: 68 MMHG | SYSTOLIC BLOOD PRESSURE: 160 MMHG | WEIGHT: 120.56 LBS | HEIGHT: 64 IN | HEART RATE: 72 BPM

## 2017-06-20 DIAGNOSIS — I25.10 CORONARY ARTERY DISEASE INVOLVING NATIVE CORONARY ARTERY OF NATIVE HEART WITHOUT ANGINA PECTORIS: Primary | ICD-10-CM

## 2017-06-20 DIAGNOSIS — Z98.890 S/P MVR (MITRAL VALVE REPAIR): ICD-10-CM

## 2017-06-20 DIAGNOSIS — Z95.0 PACEMAKER: ICD-10-CM

## 2017-06-20 PROCEDURE — 1126F AMNT PAIN NOTED NONE PRSNT: CPT | Mod: S$GLB,,, | Performed by: INTERNAL MEDICINE

## 2017-06-20 PROCEDURE — 99499 UNLISTED E&M SERVICE: CPT | Mod: S$GLB,,, | Performed by: INTERNAL MEDICINE

## 2017-06-20 PROCEDURE — 99999 PR PBB SHADOW E&M-EST. PATIENT-LVL II: CPT | Mod: PBBFAC,,, | Performed by: INTERNAL MEDICINE

## 2017-06-20 PROCEDURE — 1159F MED LIST DOCD IN RCRD: CPT | Mod: S$GLB,,, | Performed by: INTERNAL MEDICINE

## 2017-06-20 PROCEDURE — 99214 OFFICE O/P EST MOD 30 MIN: CPT | Mod: S$GLB,,, | Performed by: INTERNAL MEDICINE

## 2017-06-20 NOTE — PROGRESS NOTES
Subjective:    Patient ID:  Dulce Castro is a 72 y.o. female who presents for follow-up of MVR    HPI  She comes with no complaints, no chest pain, no shortness of breath  Headaches are main problem    Review of Systems   Constitution: Negative for decreased appetite, weakness, malaise/fatigue, weight gain and weight loss.   Cardiovascular: Negative for chest pain, dyspnea on exertion, leg swelling, palpitations and syncope.   Respiratory: Negative for cough and shortness of breath.    Gastrointestinal: Negative.    All other systems reviewed and are negative.       Objective:    Physical Exam   Constitutional: She is oriented to person, place, and time. She appears well-developed and well-nourished.   HENT:   Head: Normocephalic.   Eyes: Pupils are equal, round, and reactive to light.   Neck: Normal range of motion. Neck supple. No JVD present. Carotid bruit is not present. No thyromegaly present.   Cardiovascular: Normal rate, regular rhythm, normal heart sounds, intact distal pulses and normal pulses.  PMI is not displaced.  Exam reveals no gallop.    No murmur heard.  Pulmonary/Chest: Effort normal and breath sounds normal.   Abdominal: Soft. Normal appearance. She exhibits no mass. There is no hepatosplenomegaly. There is no tenderness.   Musculoskeletal: Normal range of motion. She exhibits no edema.   Neurological: She is alert and oriented to person, place, and time. She has normal strength and normal reflexes. No sensory deficit.   Skin: Skin is warm and intact.   Psychiatric: She has a normal mood and affect.   Nursing note and vitals reviewed.        Assessment:       1. Coronary artery disease involving native coronary artery of native heart without angina pectoris    2. S/P MVR (mitral valve repair)    3. Pacemaker         Plan:     Continue all cardiac medications  Regular exercise program  6 m f/u

## 2017-06-26 ENCOUNTER — OFFICE VISIT (OUTPATIENT)
Dept: FAMILY MEDICINE | Facility: CLINIC | Age: 72
End: 2017-06-26
Payer: MEDICARE

## 2017-06-26 VITALS
BODY MASS INDEX: 20.7 KG/M2 | HEIGHT: 64 IN | SYSTOLIC BLOOD PRESSURE: 159 MMHG | OXYGEN SATURATION: 96 % | WEIGHT: 121.25 LBS | DIASTOLIC BLOOD PRESSURE: 68 MMHG | HEART RATE: 74 BPM

## 2017-06-26 DIAGNOSIS — G89.29 CHRONIC INTRACTABLE HEADACHE, UNSPECIFIED HEADACHE TYPE: ICD-10-CM

## 2017-06-26 DIAGNOSIS — R51.9 CHRONIC INTRACTABLE HEADACHE, UNSPECIFIED HEADACHE TYPE: ICD-10-CM

## 2017-06-26 DIAGNOSIS — I10 ESSENTIAL HYPERTENSION: ICD-10-CM

## 2017-06-26 DIAGNOSIS — M65.332 TRIGGER MIDDLE FINGER OF LEFT HAND: Primary | ICD-10-CM

## 2017-06-26 PROCEDURE — 99999 PR PBB SHADOW E&M-EST. PATIENT-LVL III: CPT | Mod: PBBFAC,,, | Performed by: NURSE PRACTITIONER

## 2017-06-26 PROCEDURE — 99214 OFFICE O/P EST MOD 30 MIN: CPT | Mod: S$GLB,,, | Performed by: NURSE PRACTITIONER

## 2017-06-26 PROCEDURE — 1125F AMNT PAIN NOTED PAIN PRSNT: CPT | Mod: S$GLB,,, | Performed by: NURSE PRACTITIONER

## 2017-06-26 PROCEDURE — 1159F MED LIST DOCD IN RCRD: CPT | Mod: S$GLB,,, | Performed by: NURSE PRACTITIONER

## 2017-06-26 PROCEDURE — 99499 UNLISTED E&M SERVICE: CPT | Mod: S$GLB,,, | Performed by: NURSE PRACTITIONER

## 2017-06-26 RX ORDER — IBUPROFEN 800 MG/1
800 TABLET ORAL EVERY 6 HOURS PRN
Qty: 45 TABLET | Refills: 0 | Status: SHIPPED | OUTPATIENT
Start: 2017-06-26 | End: 2017-07-25

## 2017-06-26 RX ORDER — METHOCARBAMOL 500 MG/1
500 TABLET, FILM COATED ORAL 4 TIMES DAILY PRN
Qty: 40 TABLET | Refills: 0 | Status: SHIPPED | OUTPATIENT
Start: 2017-06-26 | End: 2017-07-06

## 2017-06-26 NOTE — PROGRESS NOTES
"Subjective:       Patient ID: Dulce Castro is a 72 y.o. female.    Chief Complaint: Headache (X 6months )    HPI   This patient is new to me. She presents to clinic with complaints of chronic headaches as well as swelling and pain to left middle finger.     She complains of headaches x 6 months. She has a neurology appointment scheduled for September. She reports the headaches are constant, they never really go away. She has tried tylenol, caffeine, nsaids, and zanaflex without relief of symptoms. She reports sensitivity to light. She reports she is sleeping, eating and drinking plenty of fluids as normal. She denies stress.     HTN: she reports that she saw Dr Zamudio last week--reports he did not want to start her on any antihypertensive medication yet therefore she is resistant---will continue checking BP at home.     She complains of left hand middle finger pain and "pulling". She denies injury or trauma. She denies numbness or tingling. She reports tenderness to palpation.  Vitals:    06/26/17 1330   BP: (!) 159/68   Pulse: 74     Review of Systems   Constitutional: Negative.  Negative for appetite change, fatigue and fever.   HENT: Negative for congestion, facial swelling and trouble swallowing.    Eyes: Negative.  Negative for pain, discharge, redness and visual disturbance.   Respiratory: Negative.  Negative for cough, chest tightness, shortness of breath and wheezing.    Cardiovascular: Negative.  Negative for chest pain and palpitations.   Gastrointestinal: Negative.  Negative for abdominal pain, constipation and diarrhea.   Genitourinary: Negative.  Negative for difficulty urinating and flank pain.   Musculoskeletal: Positive for arthralgias and joint swelling. Negative for back pain and neck pain.   Skin: Negative.  Negative for rash and wound.   Neurological: Positive for headaches. Negative for dizziness and light-headedness.   Hematological: Negative.    Psychiatric/Behavioral: Negative.  Negative " for confusion. The patient is not nervous/anxious.        Past Medical History:   Diagnosis Date    Allergy     Anemia     Anticoagulant long-term use     Anxiety     Arthritis     Bipolar disorder     Blood transfusion     Carpal tunnel syndrome     Cataract     CHF (congestive heart failure)     Depression     VEE (dyspnea on exertion)     general activity    GERD (gastroesophageal reflux disease)     Heart murmur     Hypertension     Irritable bladder     Meningitis     Mitral valve prolapse     On home O2     @ 3 liters    TEE (obstructive sleep apnea)     Pulmonary HTN     Thyroid disease      Objective:      Physical Exam   Constitutional: She is oriented to person, place, and time. She appears well-developed and well-nourished.  Non-toxic appearance. She does not have a sickly appearance. She does not appear ill. No distress.   HENT:   Head: Normocephalic and atraumatic.   Right Ear: Hearing and external ear normal.   Left Ear: Hearing and external ear normal.   Nose: Nose normal.   Mouth/Throat: Uvula is midline, oropharynx is clear and moist and mucous membranes are normal.   Eyes: Conjunctivae, EOM and lids are normal. Pupils are equal, round, and reactive to light. Right eye exhibits no discharge. Left eye exhibits no discharge.   Neck: Trachea normal and normal range of motion. Neck supple. No JVD present. No tracheal deviation present.   Cardiovascular: Intact distal pulses and normal pulses.    Pulses:       Radial pulses are 2+ on the right side, and 2+ on the left side.   Pulmonary/Chest: Effort normal. No accessory muscle usage. No respiratory distress. She exhibits no tenderness.   Abdominal: Normal appearance. She exhibits no distension.   Musculoskeletal: She exhibits no edema or deformity.        Left hand: She exhibits decreased range of motion and tenderness. She exhibits normal capillary refill.        Hands:  Neurological: She is alert and oriented to person, place, and  "time. She has normal strength. No sensory deficit. Coordination and gait normal.   Skin: Skin is warm, dry and intact. No lesion and no rash noted. She is not diaphoretic. No erythema. No pallor.   Psychiatric: Her speech is normal and behavior is normal. Judgment and thought content normal. Her mood appears anxious. Cognition and memory are normal.   Nursing note and vitals reviewed.      Assessment:       1. Trigger middle finger of left hand    2. Chronic intractable headache, unspecified headache type        Plan:       Trigger middle finger of left hand  -     Ambulatory referral to Orthopedics    Chronic intractable headache, unspecified headache type  -     methocarbamol (ROBAXIN) 500 MG Tab; Take 1 tablet (500 mg total) by mouth 4 (four) times daily as needed.  Dispense: 40 tablet; Refill: 0  -     ibuprofen (ADVIL,MOTRIN) 800 MG tablet; Take 1 tablet (800 mg total) by mouth every 6 (six) hours as needed for Pain.  Dispense: 45 tablet; Refill: 0  - As patient is on tegretol, effexor, trazodone and xanax daily this limits medication options, specifically abortive therapy for migraines, due to medication interactions    Hypertension   -She refuses to start metoprolol for migraines/htn as she reports Dr. Zamudio did not wish to start her on new meds for bp    Patient continued asking for pain medication despite explanation that medications involved in plan of care if successful will take away the pain of the headache. Advised to start robaxin first--do not take zanaflex with robaxin--and if no relief of symptoms take ibuprofen      Keep neurology appointment as scheduled       Return if symptoms worsen or fail to improve.    "This note will not be shared with the patient."  "

## 2017-06-27 DIAGNOSIS — M79.642 LEFT HAND PAIN: Primary | ICD-10-CM

## 2017-07-17 ENCOUNTER — LAB VISIT (OUTPATIENT)
Dept: LAB | Facility: HOSPITAL | Age: 72
End: 2017-07-17
Attending: INTERNAL MEDICINE
Payer: MEDICARE

## 2017-07-17 DIAGNOSIS — E53.8 B12 DEFICIENCY: ICD-10-CM

## 2017-07-17 DIAGNOSIS — D50.0 IRON DEFICIENCY ANEMIA DUE TO CHRONIC BLOOD LOSS: ICD-10-CM

## 2017-07-17 DIAGNOSIS — F31.0 BIPOLAR AFFECTIVE DISORDER, CURRENT EPISODE HYPOMANIC: ICD-10-CM

## 2017-07-17 DIAGNOSIS — I25.10 CORONARY ARTERY DISEASE INVOLVING NATIVE CORONARY ARTERY OF NATIVE HEART WITHOUT ANGINA PECTORIS: ICD-10-CM

## 2017-07-17 DIAGNOSIS — Z98.84 GASTRIC BYPASS STATUS FOR OBESITY: ICD-10-CM

## 2017-07-17 LAB
ALBUMIN SERPL BCP-MCNC: 4.2 G/DL
ALP SERPL-CCNC: 89 U/L
ALT SERPL W/O P-5'-P-CCNC: 32 U/L
ANION GAP SERPL CALC-SCNC: 6 MMOL/L
AST SERPL-CCNC: 31 U/L
BASOPHILS # BLD AUTO: 0.04 K/UL
BASOPHILS NFR BLD: 0.8 %
BILIRUB SERPL-MCNC: 0.5 MG/DL
BUN SERPL-MCNC: 26 MG/DL
CALCIUM SERPL-MCNC: 8.9 MG/DL
CHLORIDE SERPL-SCNC: 99 MMOL/L
CO2 SERPL-SCNC: 33 MMOL/L
CREAT SERPL-MCNC: 0.89 MG/DL
DIFFERENTIAL METHOD: ABNORMAL
EOSINOPHIL # BLD AUTO: 0.1 K/UL
EOSINOPHIL NFR BLD: 2.4 %
ERYTHROCYTE [DISTWIDTH] IN BLOOD BY AUTOMATED COUNT: 11.4 %
EST. GFR  (AFRICAN AMERICAN): >60 ML/MIN/1.73 M^2
EST. GFR  (NON AFRICAN AMERICAN): >60 ML/MIN/1.73 M^2
FERRITIN SERPL-MCNC: 103 NG/ML
GLUCOSE SERPL-MCNC: 103 MG/DL
HCT VFR BLD AUTO: 31.2 %
HGB BLD-MCNC: 10.2 G/DL
IRON SATN MFR SERPL: 33 %
IRON SERPL-MCNC: 88 UG/DL
LYMPHOCYTES # BLD AUTO: 1.1 K/UL
LYMPHOCYTES NFR BLD: 23.1 %
MCH RBC QN AUTO: 31 PG
MCHC RBC AUTO-ENTMCNC: 32.7 %
MCV RBC AUTO: 95 FL
MONOCYTES # BLD AUTO: 0.5 K/UL
MONOCYTES NFR BLD: 10.1 %
NEUTROPHILS # BLD AUTO: 3.1 K/UL
NEUTROPHILS NFR BLD: 63.6 %
NRBC BLD-RTO: 0 /100 WBC
PLATELET # BLD AUTO: 148 K/UL
PMV BLD AUTO: 11.2 FL
POTASSIUM SERPL-SCNC: 3.1 MMOL/L
PROT SERPL-MCNC: 6.6 G/DL
RBC # BLD AUTO: 3.29 M/UL
SODIUM SERPL-SCNC: 138 MMOL/L
TOTAL IRON BINDING CAPACITY: 270 UG/DL
WBC # BLD AUTO: 4.93 K/UL

## 2017-07-17 PROCEDURE — 85025 COMPLETE CBC W/AUTO DIFF WBC: CPT | Mod: PN

## 2017-07-17 PROCEDURE — 83540 ASSAY OF IRON: CPT | Mod: PN

## 2017-07-17 PROCEDURE — 84238 ASSAY NONENDOCRINE RECEPTOR: CPT

## 2017-07-17 PROCEDURE — 80053 COMPREHEN METABOLIC PANEL: CPT

## 2017-07-17 PROCEDURE — 82728 ASSAY OF FERRITIN: CPT | Mod: PN

## 2017-07-17 PROCEDURE — 80053 COMPREHEN METABOLIC PANEL: CPT | Mod: PN

## 2017-07-17 PROCEDURE — 85025 COMPLETE CBC W/AUTO DIFF WBC: CPT

## 2017-07-17 PROCEDURE — 83540 ASSAY OF IRON: CPT

## 2017-07-17 PROCEDURE — 82728 ASSAY OF FERRITIN: CPT

## 2017-07-17 PROCEDURE — 36415 COLL VENOUS BLD VENIPUNCTURE: CPT | Mod: PN

## 2017-07-19 LAB — STFR SERPL-MCNC: 2 MG/L

## 2017-07-25 ENCOUNTER — OFFICE VISIT (OUTPATIENT)
Dept: HEMATOLOGY/ONCOLOGY | Facility: CLINIC | Age: 72
End: 2017-07-25
Payer: MEDICARE

## 2017-07-25 VITALS
TEMPERATURE: 99 F | DIASTOLIC BLOOD PRESSURE: 63 MMHG | WEIGHT: 118.81 LBS | BODY MASS INDEX: 20.28 KG/M2 | HEART RATE: 63 BPM | SYSTOLIC BLOOD PRESSURE: 130 MMHG | HEIGHT: 64 IN | RESPIRATION RATE: 16 BRPM

## 2017-07-25 DIAGNOSIS — I27.20 PULMONARY HYPERTENSION: ICD-10-CM

## 2017-07-25 DIAGNOSIS — D50.0 IRON DEFICIENCY ANEMIA DUE TO CHRONIC BLOOD LOSS: ICD-10-CM

## 2017-07-25 DIAGNOSIS — R42: ICD-10-CM

## 2017-07-25 DIAGNOSIS — Z98.890 S/P MVR (MITRAL VALVE REPAIR): ICD-10-CM

## 2017-07-25 DIAGNOSIS — T42.1X5A: ICD-10-CM

## 2017-07-25 DIAGNOSIS — F31.0 BIPOLAR AFFECTIVE DISORDER, CURRENT EPISODE HYPOMANIC: Primary | ICD-10-CM

## 2017-07-25 DIAGNOSIS — E53.8 B12 DEFICIENCY: ICD-10-CM

## 2017-07-25 DIAGNOSIS — Z98.84 GASTRIC BYPASS STATUS FOR OBESITY: ICD-10-CM

## 2017-07-25 PROCEDURE — 99214 OFFICE O/P EST MOD 30 MIN: CPT | Mod: S$GLB,,, | Performed by: INTERNAL MEDICINE

## 2017-07-25 PROCEDURE — 99499 UNLISTED E&M SERVICE: CPT | Mod: S$GLB,,, | Performed by: INTERNAL MEDICINE

## 2017-07-25 PROCEDURE — 1125F AMNT PAIN NOTED PAIN PRSNT: CPT | Mod: S$GLB,,, | Performed by: INTERNAL MEDICINE

## 2017-07-25 PROCEDURE — 1159F MED LIST DOCD IN RCRD: CPT | Mod: S$GLB,,, | Performed by: INTERNAL MEDICINE

## 2017-07-25 PROCEDURE — 99999 PR PBB SHADOW E&M-EST. PATIENT-LVL III: CPT | Mod: PBBFAC,,, | Performed by: INTERNAL MEDICINE

## 2017-07-25 NOTE — PROGRESS NOTES
Subjective:       Patient ID: Dulce Castro is a 72 y.o. female.    Chief Complaint:was receiving transfusion when she got hypotensive, taken to [facility], completed prbc in hospital. Was able to get one unit here the 1st day,   HPI:   A 70-year-old  woman, very well known to me. The patient has pulmonary  hypertension, coronary artery disease, anemia, neutropenia and had a history of  gastric bypass and bipolar disorder. She has been following clinically. She   states her pulmonary hypertension has stabilized. She follows with   pulmonologist, and she follows with Cardiology as well.lately the tegretol has been lowered. Not drinking, no other drugs. Feeling better now since dc from hospital 12/23, occult blood was positive, pt elected for op scopes  REVIEW OF SYSTEMS:   Has pacemaker  No CP occ palpitations, feels SOB on minial activity from pulmonary HTN, c/o of headaches, has an appt with a headache specialist   denies abd pain, nausea, vomiting, cough or sputum production   occ feet swelling  Not had a capsule study  PHYSICAL EXAM:     Wt Readings from Last 3 Encounters:   07/25/17 53.9 kg (118 lb 13.3 oz)   06/26/17 55 kg (121 lb 4.1 oz)   06/20/17 54.7 kg (120 lb 9.5 oz)     Temp Readings from Last 3 Encounters:   07/25/17 99 °F (37.2 °C)   03/23/17 98.7 °F (37.1 °C)   02/17/17 98.2 °F (36.8 °C)     BP Readings from Last 3 Encounters:   07/25/17 130/63   06/26/17 (!) 159/68   06/20/17 (!) 160/68     Pulse Readings from Last 3 Encounters:   07/25/17 63   06/26/17 74   06/20/17 72     GENERAL: Comfortable looking patient. Patient is in no distress.  Awake, alert and oriented to time, person and place.  No anxiety, or agitation.      HEENT: Normal conjunctivae and eyelids. WNL.  PERRLA 3 to 4 mm. No icterus, no pallor, no congestion, and no discharge noted.     NECK:  Supple. Trachea is central.  No crepitus.  No JVD or masses.    RESPIRATORY:  No intercostal retractions.  No dullness to percussion.  Chest  is clear to auscultation.  No rales, rhonchi or wheezes.  No crepitus.  Good air entry bilaterally.    CARDIOVASCULAR:  S1 and S2 are normally heard without murmurs or gallops.  All peripheral pulses are present.    ABDOMEN:  Normal abdomen.  No hepatosplenomegaly.  No free fluid.  Bowel sounds are present.  No hernia noted. No masses.  No rebound or tenderness.  No guarding or rigidity.  Umbilicus is midline.    LYMPHATICS:  No axillary, cervical, supraclavicular, submental, or inguinal lymphadenopathy.    SKIN/MUSCULOSKELETAL:  There is no evidence of excoriation marks or ecchmosis.  No rashes.  No cyanosis.  No clubbing.  No joint or skeletal deformities noted.  Normal range of motion.    NEUROLOGIC:  Higher functions are appropriate.  No cranial nerve deficits.  Normal sebastián.  Normal strength.  Motor and sensory functions are normal.  Deep tendon reflexes are normal.    GENITAL/RECTAL:  Exams are deferred.      Laboratory:     CBC:  Lab Results   Component Value Date    WBC 4.93 07/17/2017    RBC 3.29 (L) 07/17/2017    HGB 10.2 (L) 07/17/2017    HCT 31.2 (L) 07/17/2017    MCV 95 07/17/2017    MCH 31.0 07/17/2017    MCHC 32.7 07/17/2017    RDW 11.4 (L) 07/17/2017     (L) 07/17/2017    MPV 11.2 07/17/2017    GRAN 3.1 07/17/2017    GRAN 63.6 07/17/2017    LYMPH 1.1 07/17/2017    LYMPH 23.1 07/17/2017    MONO 0.5 07/17/2017    MONO 10.1 07/17/2017    EOS 0.1 07/17/2017    BASO 0.04 07/17/2017    EOSINOPHIL 2.4 07/17/2017    BASOPHIL 0.8 07/17/2017       BMP: BMP  Lab Results   Component Value Date     07/17/2017    K 3.1 (L) 07/17/2017    CL 99 07/17/2017    CO2 33 (H) 07/17/2017    BUN 26 (H) 07/17/2017    CREATININE 0.89 07/17/2017    CALCIUM 8.9 07/17/2017    ANIONGAP 6 (L) 07/17/2017    ESTGFRAFRICA >60 07/17/2017    EGFRNONAA >60 07/17/2017       LFT:   Lab Results   Component Value Date    ALT 32 07/17/2017    AST 31 07/17/2017    ALKPHOS 89 07/17/2017    BILITOT 0.5 07/17/2017     Lab Results    Component Value Date    [id number] 990 (H) 02/20/2017     Haptoglobin was low  Assessment/Plan:   hgb has stabilized, may have had a component of hemolysis.   Vish neg haptoglobin has normalsied .   Cbc, cmp   hbg much better   rtc 2 months with cbc, cmp,    bipolar dz: well controlled, chronic hradaches? Hypoxic, vascular, seeing headache specialist  Tegretol has been given by Dr Ness Haji for bipolar dz.

## 2017-07-31 ENCOUNTER — OFFICE VISIT (OUTPATIENT)
Dept: NEUROLOGY | Facility: CLINIC | Age: 72
End: 2017-07-31
Payer: MEDICARE

## 2017-07-31 VITALS
SYSTOLIC BLOOD PRESSURE: 140 MMHG | HEIGHT: 64 IN | WEIGHT: 119.06 LBS | RESPIRATION RATE: 19 BRPM | HEART RATE: 71 BPM | TEMPERATURE: 98 F | BODY MASS INDEX: 20.32 KG/M2 | DIASTOLIC BLOOD PRESSURE: 69 MMHG

## 2017-07-31 DIAGNOSIS — F31.9 BIPOLAR DISORDER, UNSPECIFIED: ICD-10-CM

## 2017-07-31 DIAGNOSIS — G43.719 CHRONIC MIGRAINE WITHOUT AURA, WITH INTRACTABLE MIGRAINE, SO STATED, WITHOUT MENTION OF STATUS MIGRAINOSUS: Primary | ICD-10-CM

## 2017-07-31 DIAGNOSIS — D50.9 IRON DEFICIENCY ANEMIA, UNSPECIFIED IRON DEFICIENCY ANEMIA TYPE: ICD-10-CM

## 2017-07-31 DIAGNOSIS — F31.9 BIPOLAR AFFECTIVE DISORDER, REMISSION STATUS UNSPECIFIED: ICD-10-CM

## 2017-07-31 DIAGNOSIS — E53.8 B12 DEFICIENCY: ICD-10-CM

## 2017-07-31 DIAGNOSIS — R40.20 COMA, UNSPECIFIED COMA DEPTH, UNSPECIFIED COMA TIMING: ICD-10-CM

## 2017-07-31 DIAGNOSIS — Z98.890 S/P MVR (MITRAL VALVE REPAIR): ICD-10-CM

## 2017-07-31 DIAGNOSIS — Z98.84 GASTRIC BYPASS STATUS FOR OBESITY: ICD-10-CM

## 2017-07-31 DIAGNOSIS — Z99.81 HYPOXEMIA REQUIRING SUPPLEMENTAL OXYGEN: ICD-10-CM

## 2017-07-31 DIAGNOSIS — R09.02 HYPOXEMIA REQUIRING SUPPLEMENTAL OXYGEN: ICD-10-CM

## 2017-07-31 DIAGNOSIS — I27.29 PULMONARY HYPERTENSIVE VENOUS DISEASE: ICD-10-CM

## 2017-07-31 DIAGNOSIS — G56.03 CARPAL TUNNEL SYNDROME ON BOTH SIDES: ICD-10-CM

## 2017-07-31 DIAGNOSIS — R29.6 FALLS FREQUENTLY: ICD-10-CM

## 2017-07-31 DIAGNOSIS — N17.9 ACUTE KIDNEY FAILURE, UNSPECIFIED: ICD-10-CM

## 2017-07-31 DIAGNOSIS — Z79.01 ANTICOAGULATED ON COUMADIN: ICD-10-CM

## 2017-07-31 PROCEDURE — 1159F MED LIST DOCD IN RCRD: CPT | Mod: S$GLB,,, | Performed by: PSYCHIATRY & NEUROLOGY

## 2017-07-31 PROCEDURE — 99499 UNLISTED E&M SERVICE: CPT | Mod: S$GLB,,, | Performed by: PSYCHIATRY & NEUROLOGY

## 2017-07-31 PROCEDURE — 99999 PR PBB SHADOW E&M-EST. PATIENT-LVL III: CPT | Mod: PBBFAC,,, | Performed by: PSYCHIATRY & NEUROLOGY

## 2017-07-31 PROCEDURE — 1125F AMNT PAIN NOTED PAIN PRSNT: CPT | Mod: S$GLB,,, | Performed by: PSYCHIATRY & NEUROLOGY

## 2017-07-31 PROCEDURE — 99205 OFFICE O/P NEW HI 60 MIN: CPT | Mod: S$GLB,,, | Performed by: PSYCHIATRY & NEUROLOGY

## 2017-07-31 RX ORDER — GABAPENTIN 100 MG/1
CAPSULE ORAL
Qty: 90 CAPSULE | Refills: 11 | Status: SHIPPED | OUTPATIENT
Start: 2017-07-31 | End: 2017-09-05 | Stop reason: SDUPTHER

## 2017-07-31 NOTE — LETTER
July 31, 2017      Corrie Lee MD  [address] [facility] [address] [place] [address]           [facility]  [address] [facility] [address] [place] [address]  Phone: [phone]  Fax: [phone]          Patient: Dulce Castro   MR Number: [id number]   YOB: 1945   Date of Visit: 7/31/2017       Dear Dr. Corrie Lee:    Thank you for referring Dulce Castro to me for evaluation. Attached you will find relevant portions of my assessment and plan of care.    If you have questions, please do not hesitate to call me. I look forward to following Dulce Castro along with you.    Sincerely,    Chey Nelson MD    Enclosure  CC:  No Recipients    If you would like to receive this communication electronically, please contact [email or web address] or [phone] to request more information on [employer] access.    For providers and/or their staff who would like to refer a patient to [facility], please contact us through our one-stop-shop provider referral line, [facility], at [phone].    If you feel you have received this communication in error or would no longer like to receive these types of communications, please e-mail [email or web address]

## 2017-07-31 NOTE — PROGRESS NOTES
Subjective:       Patient ID: Dulce Castro is a 72 y.o. female.    Chief Complaint: Headache    HPI   The patient is a pleasant 73 y/o female presenting with chief complain of headache. They started last August 2016, they are now daily and quite disabling. They are located above the eyes. While she suffers from dry eyes, the ophthalmologist told her that this is not the cause of her headaches. Likewise, the ENT has excluded sinus pathology responsible for the headaches. She has had 3 CT of the head, two in August 2016 and one in December 2016, all of which are negative. She is anemic and low in iron requiring infusions from time to time. Her medical history is quite complicated because of cardiopulmonary co morbidity. She suffers with Pulmonary Hypertension, she is oxygen dependent and is status post mitral valve prolapse and repair. In addition she has an implanted insertional dual pacemaker. She has been on Tegretol, Desyrel and Xanax for a long time for Bipolar Disorder. In addition, the patient was in a coma for 3 days, unknown origin. She is status post bariatric surgery going from 220 lbs to 120 lbs.  Please see details of headache characteristics headache below.    Headache questionnaire     1. When did your Headaches start?       August 2016      2. Where are your headaches located?                        Around and above the eyes        3. Your headache's characteristics:                        Pressure, Throbbing, Pounding     4. How long does the headache last?                        Days        5. How often does the headache occur?                        daily        6. Are your headaches preceded or accompanied by other symptoms? no                        If yes, please describe.  N/A        7. Does the headache awaken you at night? no                        If so, how often? N/A           8. Please jeremias the word that best describes your headache's intensity:                          severe        9. Using a scale of 1 through 10, with 0 = no pain and 10 = the worst pain:                        What score is your headache now? 8                        What score is your headache at its worst? 10                        What score is your headache at its best? 5           10. Possible associated headache symptoms:  [x]  Sensitivity to light                                      [x] Dizziness                                        [] Nasal or sinus pressure/ pain                        [x] Sensitivity to noise                                     [] Vertigo                                            [] Problems with concentration  [] Sensitivity to smells             [x] Ringing in ears                     [] Problems with memory                                            [x] Blurred vision                                 [] Irritability                                         [] Problems with task completion   [] Double vision                                 [] Anger                                  [x]  Problems with relaxation  [] Loss of appetite                                         [x] Anxiety                                           [x] Neck tightness, Neck pain  [x] Nausea                                                                 [] Nasal congestion  [] Vomiting                                                                       11. Headache improving factors:  [x] Sleep                                  [] Heat  [x] Darkness                                        [] Ice  [] Local pressure                     [] Menses (period)  [] Massage                                        [] Medications:          12. Headache worsening factors:   [x] Fatigue                     [] Sneezing                                        [] Changes in Weather  [x] Light             [] Bending Over           [x] Stress  [x] Noise            [] Ovulation                                        [] Multiple  Sclerosis Flare-Up  [] Smells                      [] Menses                                          [] Food   [] Coughing                  [] Alcohol        13. Number of caffeinated drinks per day: 1        14. Number of diet drinks per day:  4        15. Have you seen any other [facility] within the last 3 years?  no         Please Check any Medications Tried for Headache     AED Neuromodulators   MAOIs   Ergot Alkaloids     Acetazolamide (Diamox) [] Phenelzine (Nardil) [] Dihydroergotamine (Migranal) []   Carbamazepine (Tegretol) [x] Tranylcypromine (Parnate) [] Ergotamine (Ergomar) []   Gabapentin (Neurontin) [] Antihistamine/Serotonergic   Triptans     Lacosamide (Vimpat) [] Cyproheptadine (Periactin) [] Almotriptan (Axert) []   Lamotrigine (Lamictal) [] Antihypertensives   Eletriptan (Relpax) []   Levatiracetam (Keppra) [] Atenolol (Tenormin) [] Frovatriptan (Frova) []   Oxcarbazepine (Trileptal) [] Bisoprostol (Zebeta) [] Naratriptan (Amerge) []   Phenobarbital [] Candesartan (Atacand) [] Rizatriptan (Maxalt) []   Phenytoin (Dilantin) [] Diltiazem (Cardizem) [] Sumatriptan (Imitrex) []   Pregabalin (Lyrica) [] Lisinopril (Prinivil, Zestril) [] Zolmitriptan (Zomig) []   Primidone (Mysoline) [] Metoprolol (Toprol) [] Combo Abortives     Tiagabine (Gabatril) [] Nadolol (Corgard) [] Butalbital and Acetaminophen (Bupap) []   Topiramate (Topamax)  (Trokendi) [] Nicardipine (Cardene) []     Vigabatrin (Sabril) [] Nimodipine (Nimotop) [] Butalbital, Acetaminophen, and caffeine (Fioricet) []   Valproic Acid (Depakote) (Divalproex Sodium) [] Propranolol (Inderal) []     Zonisamide (Zonegran) [] Telmisartan (Micardis) [] Butalbital, Aspirin, and caffeine (Fiorinal) []   Benzodiazepines   Timolol (Blocadren) []     Alprazolam (Xanax) [] Verapamil (Calan, Verelan) [] Butalbital, Caffeine, Acetaminophen, and Codeine (Fioricet with Codeine) []   Diazepam (Valium) [] NSAIDs       Lorazepam (Ativan) []  Acetaminophen (Tylenol) [x]     Clonazepam (Klonopin) [] Acetylsalicylic Acid (Aspirin) [] Butalbital, Caffeine, Aspirin, and Codeine  (Fiorinal with Codeine) []   Antidepressants   Diclofenac (Cambia) []     Amitriptyline (Elavil) [] Ibuprofen (Motrin) [x]     Desipramine (Norpramin) [] Indomethacin (Indocin) [] Aspirin, Caffeine, and Acetaminophen (Excedrin) (Goodys) [x]   Doxepin (Sinequan) [] Ketoprofen (Orudis) []     Fluoxetine (Prozac) [] Ketorolac (Toradol) [] Acetaminophen, Dichloralphenazone, and Isometheptene (Midrin) []   Imipramine (Tofranil) [] Naproxen (Anaprox) (Aleve) [x]     Nortriptyline (Pamelor) [] Meclofenamic Acid (Meclomen) []     Venlafaxine (Effexor) [x] Meloxicam (Mobic) [] Aspirin, Salicylamide, and Caffeine (BC Powder) [x]                           Review of Systems   Constitutional: Positive for fatigue. Negative for activity change, appetite change and fever.   HENT: Positive for tinnitus. Negative for congestion, dental problem, hearing loss, sinus pressure, trouble swallowing and voice change.    Eyes: Positive for visual disturbance. Negative for photophobia, pain and redness.   Respiratory: Negative for cough, chest tightness and shortness of breath.    Cardiovascular: Negative for chest pain, palpitations and leg swelling.   Gastrointestinal: Negative for abdominal pain, blood in stool, nausea and vomiting.   Endocrine: Negative for cold intolerance and heat intolerance.   Genitourinary: Negative for difficulty urinating, frequency, menstrual problem and urgency.   Musculoskeletal: Positive for arthralgias, myalgias and neck pain. Negative for back pain, gait problem, joint swelling and neck stiffness.   Skin: Negative.    Neurological: Positive for weakness and headaches. Negative for dizziness, tremors, seizures, syncope, facial asymmetry, speech difficulty, light-headedness and numbness.   Hematological: Negative for adenopathy. Does not bruise/bleed easily.    Psychiatric/Behavioral: Negative for agitation, behavioral problems, confusion, decreased concentration, self-injury, sleep disturbance and suicidal ideas. The patient is not nervous/anxious and is not hyperactive.          Past Medical History:   Diagnosis Date    Allergy     Anemia     Anticoagulant long-term use     Anxiety     Arthritis     Bipolar disorder     Blood transfusion     Carpal tunnel syndrome     Cataract     CHF (congestive heart failure)     Depression     VEE (dyspnea on exertion)     general activity    GERD (gastroesophageal reflux disease)     Heart murmur     Hypertension     Irritable bladder     Meningitis     Mitral valve prolapse     On home O2     @ 3 liters    TEE (obstructive sleep apnea)     Pulmonary HTN     Thyroid disease      Past Surgical History:   Procedure Laterality Date     bladder stimulator      [employer]    ABDOMINAL SURGERY      APPENDECTOMY      CARDIAC PACEMAKER PLACEMENT  10/2016    CARDIAC SURGERY  2007    [facility]    CARPAL TUNNEL RELEASE Right     CHOLECYSTECTOMY  2010    COLONOSCOPY N/A 2/17/2017    Procedure: COLONOSCOPY;  Surgeon: Artie Mills Jr., MD;  Location: [facility];  Service: Endoscopy;  Laterality: N/A;    ESOPHAGOGASTRODUODENOSCOPY  09/14/2011    BEAU.   Normal esophagus.  NERD.  Gastric bypass.  Normal anastomosis.  Normal jejunum.    EYE SURGERY Bilateral     cataracts    FOOT SURGERY Right     2 Hematomas on nerves    GASTRIC BYPASS  2004    HYSTERECTOMY      separate procedures for uterus, ovaries and then an ex-lap for adhesions    open heart surgery      mitral valve repair     Family History   Problem Relation Age of Onset    Heart disease Mother     Heart disease Father     Heart disease Brother     Collagen disease Neg Hx      Social History     Social History    Marital status:      Spouse name: N/A    Number of children: N/A    Years of education: N/A     Occupational History    Not on  file.     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     Review of patient's allergies indicates:   Allergen Reactions    Morphine Other (See Comments)     Difficulty swallowing    Tramadol Other (See Comments)     itching       Current Outpatient Prescriptions:     alprazolam (XANAX) 1 MG tablet, Take 1 mg by mouth 4 (four) times daily as needed., Disp: , Rfl:     carbamazepine (TEGRETOL) 200 mg tablet, Take 200 mg by mouth 2 (two) times daily., Disp: , Rfl:     CYANOCOBALAMIN/COBAMAMIDE (B12 SL), Place under the tongue., Disp: , Rfl:     diphenhydrAMINE (BENADRYL) 25 mg capsule, Take 1 each (25 mg total) by mouth daily as needed for Itching., Disp: , Rfl: 0    furosemide (LASIX) 40 MG tablet, Take 1 tablet (40 mg total) by mouth Every 3 (three) days. Hold if sbp < 100, Disp: , Rfl:     levothyroxine (SYNTHROID) 112 MCG tablet, Take 1 tablet (112 mcg total) by mouth once daily., Disp: 30 tablet, Rfl: 11    metOLazone (ZAROXOLYN) 5 MG tablet, Take 0.5 tablets (2.5 mg total) by mouth once a week. Hold if sbp < 100, Disp: , Rfl:     OXYGEN-AIR DELIVERY SYSTEMS MISC, 3 L by Nasal route Daily. uses hs and prn for SOB, Disp: , Rfl:     potassium chloride SA (K-DUR,KLOR-CON) 20 MEQ tablet, Take 1 tablet (20 mEq total) by mouth once daily. X 3 days (Patient taking differently: Take 20 mEq by mouth once daily. X 3 days  Takes with lasix), Disp: 3 tablet, Rfl: 0    promethazine (PHENERGAN) 25 MG suppository, Place 1 suppository (25 mg total) rectally every 6 (six) hours as needed for Nausea., Disp: 30 suppository,  Rfl: 2    trazodone (DESYREL) 300 MG tablet, Take 0.5 tablets (150 mg total) by mouth nightly as needed for Insomnia. (Patient taking differently: Take 300 mg by mouth nightly. ), Disp: 30 tablet, Rfl: 0    venlafaxine (EFFEXOR-XR) 75 MG 24 hr capsule, 75 mg. Three tablets daily 225 mg, Disp: , Rfl:       Objective:      Vitals:    07/31/17 0902   BP: (!) 140/69   Pulse: 71   Resp: 19   Temp: 98.1 °F (36.7 °C)     Body mass index is 20.43 kg/m².    Physical Exam    Constitutional:   She appears well-developed, looks frail. She is well groomed    HENT:    Head: Atraumatic, oral and nasal mucosa intact  Eyes: Conjunctivae slightly hyperemic, EOM are normal. Pupils are equal, round, and reactive to light OU  Neck: Neck supple. No thyromegaly present  Cardiovascular: Normal rate and normal heart sounds  No murmur heard  Pulmonary/Chest: Effort normal and breath sounds normal  Skin: Skin is warm and dry  Psychiatric: Normal mood and affect     Neuro exam:    Mental status:  Awake, attentive, Alert, oriented to self, place, year and month  Language function is intactt      Cranial Nerves:  Smell was not formally evaluated  Cranial Nerves II - XII: intact  Pursuits were smooth, normal saccades, no nystagmus OU  Funduscopic exam - disc were flat and pink, no exudates or hemorrhages OU  Motor - facial movement was symmetrical and normal     Palate moved well and was symmetrical with normal palatal and oral sensation  Tongue movements were full    Coordination:     Rapid alternating movements and rapid finger tapping - normal bilaterally  Finger to nose - normal and symmetric bilaterally   Arm roll - smooth and symmetric   No intentional or positional tremor.     Motor:  Normal muscle bulk and symmetry. No fasciculations were noted    No pronator drift  Strength 5/5 bilaterally     Reflexes:  Tendon reflexes were 2 + at biceps, triceps, brachioradialis, patellar, and Achilles bilaterally  On plantar stimulation toes were  down going bilaterally  No clonus was noted     Sensory: Intact to light touch, pin prick in all extremities.     Gait: Romberg absent. Slow pace, mild stooped posture. Normal arm swing and turns.     Review of Data: Head CT x 3 all negative. Iron deficiency anemia. Abnormal ECHO        Assessment and Plan   Very complicated case due to underlying co morbidities which influence therapeutic decisions. Will try Gabapentin 100 mg TID starting with 1 po QHS and increasing as tolerated. If no response, will proceed with Botox  Pulmonary Hypertension  Oxygen dependent  Chronic Warfarin Therapy  Status post Bariatric surgery  Multiple other co morbidities as listed in the problem list    I have discussed the side effects of the medications prescribed and the patient acknowledges understanding    Counseling:  More than 50% of the 60 minute encounter was spent face to face counseling the patient regarding current status and future plan of care as well as side of the medications. All questions were answered to patient's satisfaction    Yousif Nelson M.D  Medical Director, Headache and Facial Pain  [facility]

## 2017-07-31 NOTE — PROGRESS NOTES
Headache questionnaire    1. When did your Headaches start?    6 months ago      2. Where are your headaches located?   Around eye      3. Your headache's characteristics:   Pressure, Throbbing, Pounding    4. How long does the headache last?   Days      5. How often does the headache occur?   daily      6. Are your headaches preceded or accompanied by other symptoms? no   If yes, please describe.  N/A      7. Does the headache awaken you at night? no   If so, how often? N/A        8. Please jeremias the word that best describes your headache's intensity:    severe      9. Using a scale of 1 through 10, with 0 = no pain and 10 = the worst pain:   What score is your headache now? 8   What score is your headache at its worst? 10   What score is your headache at its best? 5        10. Possible associated headache symptoms:  [x]  Sensitivity to light  [x] Dizziness  [] Nasal or sinus pressure/ pain   [x] Sensitivity to noise  [] Vertigo  [] Problems with concentration  [] Sensitivity to smells  [x] Ringing in ears  [] Problems with memory    [x] Blurred vision  [] Irritability  [] Problems with task completion   [] Double vision  [] Anger  [x]  Problems with relaxation  [] Loss of appetite  [x] Anxiety  [x] Neck tightness, Neck pain  [x] Nausea   [] Nasal congestion  [] Vomiting         11. Headache improving factors:  [x] Sleep  [] Heat  [x] Darkness  [] Ice  [] Local pressure [] Menses (period)  [] Massage   [] Medications:        12. Headache worsening factors:   [x] Fatigue [] Sneezing  [] Changes in Weather  [x] Light [] Bending Over [x] Stress  [x] Noise [] Ovulation  [] Multiple Sclerosis Flare-Up  [] Smells  [] Menses  [] Food   [] Coughing [] Alcohol      13. Number of caffeinated drinks per day: 1      14. Number of diet drinks per day:  4      15. Have you seen any other [facility] within the last 3 years?  no       Please Check any Medications Tried for Headache    AED Neuromodulators  MAOIs  Ergot  Alkaloids    Acetazolamide (Diamox) [] Phenelzine (Nardil) [] Dihydroergotamine (Migranal) []   Carbamazepine (Tegretol) [x] Tranylcypromine (Parnate) [] Ergotamine (Ergomar) []   Gabapentin (Neurontin) [] Antihistamine/Serotonergic  Triptans    Lacosamide (Vimpat) [] Cyproheptadine (Periactin) [] Almotriptan (Axert) []   Lamotrigine (Lamictal) [] Antihypertensives  Eletriptan (Relpax) []   Levatiracetam (Keppra) [] Atenolol (Tenormin) [] Frovatriptan (Frova) []   Oxcarbazepine (Trileptal) [] Bisoprostol (Zebeta) [] Naratriptan (Amerge) []   Phenobarbital [] Candesartan (Atacand) [] Rizatriptan (Maxalt) []   Phenytoin (Dilantin) [] Diltiazem (Cardizem) [] Sumatriptan (Imitrex) []   Pregabalin (Lyrica) [] Lisinopril (Prinivil, Zestril) [] Zolmitriptan (Zomig) []   Primidone (Mysoline) [] Metoprolol (Toprol) [] Combo Abortives    Tiagabine (Gabatril) [] Nadolol (Corgard) [] Butalbital and Acetaminophen (Bupap) []   Topiramate (Topamax)  (Trokendi) [] Nicardipine (Cardene) []     Vigabatrin (Sabril) [] Nimodipine (Nimotop) [] Butalbital, Acetaminophen, and caffeine (Fioricet) []   Valproic Acid (Depakote) (Divalproex Sodium) [] Propranolol (Inderal) []     Zonisamide (Zonegran) [] Telmisartan (Micardis) [] Butalbital, Aspirin, and caffeine (Fiorinal) []   Benzodiazepines  Timolol (Blocadren) []     Alprazolam (Xanax) [] Verapamil (Calan, Verelan) [] Butalbital, Caffeine, Acetaminophen, and Codeine (Fioricet with Codeine) []   Diazepam (Valium) [] NSAIDs      Lorazepam (Ativan) [] Acetaminophen (Tylenol) [x]     Clonazepam (Klonopin) [] Acetylsalicylic Acid (Aspirin) [] Butalbital, Caffeine, Aspirin, and Codeine  (Fiorinal with Codeine) []   Antidepressants  Diclofenac (Cambia) []     Amitriptyline (Elavil) [] Ibuprofen (Motrin) [x]     Desipramine (Norpramin) [] Indomethacin (Indocin) [] Aspirin, Caffeine, and Acetaminophen (Excedrin) (Goodys) [x]   Doxepin (Sinequan) [] Ketoprofen (Orudis) []     Fluoxetine (Prozac)  [] Ketorolac (Toradol) [] Acetaminophen, Dichloralphenazone, and Isometheptene (Midrin) []   Imipramine (Tofranil) [] Naproxen (Anaprox) (Aleve) [x]     Nortriptyline (Pamelor) [] Meclofenamic Acid (Meclomen) []     Venlafaxine (Effexor) [x] Meloxicam (Mobic) [] Aspirin, Salicylamide, and Caffeine (BC Powder) [x]

## 2017-08-07 ENCOUNTER — CLINICAL SUPPORT (OUTPATIENT)
Dept: CARDIOLOGY | Facility: CLINIC | Age: 72
End: 2017-08-07
Payer: MEDICARE

## 2017-08-07 DIAGNOSIS — Z95.0 CARDIAC PACEMAKER IN SITU: ICD-10-CM

## 2017-08-07 DIAGNOSIS — R00.1 BRADYCARDIA: ICD-10-CM

## 2017-08-07 PROCEDURE — 93296 REM INTERROG EVL PM/IDS: CPT | Mod: S$GLB,,, | Performed by: INTERNAL MEDICINE

## 2017-08-07 PROCEDURE — 93294 REM INTERROG EVL PM/LDLS PM: CPT | Mod: S$GLB,,, | Performed by: INTERNAL MEDICINE

## 2017-08-14 DIAGNOSIS — I49.5 SSS (SICK SINUS SYNDROME): ICD-10-CM

## 2017-08-14 DIAGNOSIS — Z95.0 CARDIAC PACEMAKER IN SITU: Primary | ICD-10-CM

## 2017-08-21 RX ORDER — TIZANIDINE 4 MG/1
4 TABLET ORAL 2 TIMES DAILY PRN
Qty: 60 TABLET | Refills: 3 | Status: SHIPPED | OUTPATIENT
Start: 2017-08-21 | End: 2017-08-31

## 2017-09-05 ENCOUNTER — TELEPHONE (OUTPATIENT)
Dept: NEUROLOGY | Facility: CLINIC | Age: 72
End: 2017-09-05

## 2017-09-05 RX ORDER — GABAPENTIN 100 MG/1
CAPSULE ORAL
Qty: 180 CAPSULE | Refills: 11 | Status: SHIPPED | OUTPATIENT
Start: 2017-09-05 | End: 2017-11-07 | Stop reason: ALTCHOICE

## 2017-09-05 NOTE — TELEPHONE ENCOUNTER
Returned call and spoke with patient. Patient stated she was instructed to call back in a few months to let the doctor know how she is doing. Patient stated that when she first started taking Neurontin that is helped some, but not completely. Patient stated that now if she takes Neurontin she can not feel a difference and it is not helping. She has had daily headaches. Patient has tried to Motrin 800mg prescribed by her primary care and it is not helping. Please advise.

## 2017-09-05 NOTE — TELEPHONE ENCOUNTER
----- Message from Riya Johnson sent at 9/5/2017  9:18 AM CDT -----  Contact:   call  back //[phone]    Calling to  Speak to the  Nurse about  meds   Pt is  Takeing// please call

## 2017-10-20 ENCOUNTER — LAB VISIT (OUTPATIENT)
Dept: LAB | Facility: HOSPITAL | Age: 72
End: 2017-10-20
Attending: INTERNAL MEDICINE
Payer: MEDICARE

## 2017-10-20 DIAGNOSIS — T42.1X5A: ICD-10-CM

## 2017-10-20 DIAGNOSIS — I27.20 PULMONARY HYPERTENSION: ICD-10-CM

## 2017-10-20 DIAGNOSIS — E53.8 B12 DEFICIENCY: ICD-10-CM

## 2017-10-20 DIAGNOSIS — Z98.890 S/P MVR (MITRAL VALVE REPAIR): ICD-10-CM

## 2017-10-20 DIAGNOSIS — R42: ICD-10-CM

## 2017-10-20 DIAGNOSIS — D50.0 IRON DEFICIENCY ANEMIA DUE TO CHRONIC BLOOD LOSS: ICD-10-CM

## 2017-10-20 DIAGNOSIS — F31.0 BIPOLAR AFFECTIVE DISORDER, CURRENT EPISODE HYPOMANIC: ICD-10-CM

## 2017-10-20 DIAGNOSIS — Z98.84 GASTRIC BYPASS STATUS FOR OBESITY: ICD-10-CM

## 2017-10-20 LAB
ALBUMIN SERPL BCP-MCNC: 4.3 G/DL
ALP SERPL-CCNC: 74 U/L
ALT SERPL W/O P-5'-P-CCNC: 40 U/L
ANION GAP SERPL CALC-SCNC: 8 MMOL/L
AST SERPL-CCNC: 28 U/L
BASOPHILS # BLD AUTO: 0.04 K/UL
BASOPHILS NFR BLD: 1 %
BILIRUB SERPL-MCNC: 0.3 MG/DL
BUN SERPL-MCNC: 23 MG/DL
CALCIUM SERPL-MCNC: 8.8 MG/DL
CHLORIDE SERPL-SCNC: 90 MMOL/L
CO2 SERPL-SCNC: 38 MMOL/L
CREAT SERPL-MCNC: 0.86 MG/DL
DIFFERENTIAL METHOD: ABNORMAL
EOSINOPHIL # BLD AUTO: 0.1 K/UL
EOSINOPHIL NFR BLD: 2.4 %
ERYTHROCYTE [DISTWIDTH] IN BLOOD BY AUTOMATED COUNT: 11.8 %
EST. GFR  (AFRICAN AMERICAN): >60 ML/MIN/1.73 M^2
EST. GFR  (NON AFRICAN AMERICAN): >60 ML/MIN/1.73 M^2
FERRITIN SERPL-MCNC: 120 NG/ML
GLUCOSE SERPL-MCNC: 95 MG/DL
HCT VFR BLD AUTO: 32.6 %
HGB BLD-MCNC: 10.8 G/DL
IRON SATN MFR SERPL: 31 %
IRON SERPL-MCNC: 93 UG/DL
LYMPHOCYTES # BLD AUTO: 1.3 K/UL
LYMPHOCYTES NFR BLD: 30.8 %
MCH RBC QN AUTO: 31.2 PG
MCHC RBC AUTO-ENTMCNC: 33.1 G/DL
MCV RBC AUTO: 94 FL
MONOCYTES # BLD AUTO: 0.5 K/UL
MONOCYTES NFR BLD: 12.5 %
NEUTROPHILS # BLD AUTO: 2.2 K/UL
NEUTROPHILS NFR BLD: 53.3 %
NRBC BLD-RTO: 0 /100 WBC
PLATELET # BLD AUTO: 157 K/UL
PMV BLD AUTO: 10.9 FL
POTASSIUM SERPL-SCNC: 3.2 MMOL/L
PROT SERPL-MCNC: 6.8 G/DL
RBC # BLD AUTO: 3.46 M/UL
SODIUM SERPL-SCNC: 136 MMOL/L
TOTAL IRON BINDING CAPACITY: 302 UG/DL
VIT B12 SERPL-MCNC: >1000 PG/ML
WBC # BLD AUTO: 4.16 K/UL

## 2017-10-20 PROCEDURE — 80053 COMPREHEN METABOLIC PANEL: CPT

## 2017-10-20 PROCEDURE — 80053 COMPREHEN METABOLIC PANEL: CPT | Mod: PN

## 2017-10-20 PROCEDURE — 36415 COLL VENOUS BLD VENIPUNCTURE: CPT | Mod: PN

## 2017-10-20 PROCEDURE — 83540 ASSAY OF IRON: CPT

## 2017-10-20 PROCEDURE — 84238 ASSAY NONENDOCRINE RECEPTOR: CPT

## 2017-10-20 PROCEDURE — 85025 COMPLETE CBC W/AUTO DIFF WBC: CPT | Mod: PN

## 2017-10-20 PROCEDURE — 82728 ASSAY OF FERRITIN: CPT | Mod: PN

## 2017-10-20 PROCEDURE — 82607 VITAMIN B-12: CPT

## 2017-10-20 PROCEDURE — 82607 VITAMIN B-12: CPT | Mod: PN

## 2017-10-20 PROCEDURE — 82728 ASSAY OF FERRITIN: CPT

## 2017-10-20 PROCEDURE — 85025 COMPLETE CBC W/AUTO DIFF WBC: CPT

## 2017-10-20 PROCEDURE — 83540 ASSAY OF IRON: CPT | Mod: PN

## 2017-10-23 LAB — STFR SERPL-MCNC: 2.3 MG/L

## 2017-10-26 ENCOUNTER — OFFICE VISIT (OUTPATIENT)
Dept: HEMATOLOGY/ONCOLOGY | Facility: CLINIC | Age: 72
End: 2017-10-26
Payer: MEDICARE

## 2017-10-26 VITALS
BODY MASS INDEX: 20.63 KG/M2 | SYSTOLIC BLOOD PRESSURE: 122 MMHG | RESPIRATION RATE: 18 BRPM | WEIGHT: 120.81 LBS | DIASTOLIC BLOOD PRESSURE: 59 MMHG | HEIGHT: 64 IN | HEART RATE: 86 BPM | TEMPERATURE: 99 F

## 2017-10-26 DIAGNOSIS — D51.8 OTHER VITAMIN B12 DEFICIENCY ANEMIA: ICD-10-CM

## 2017-10-26 DIAGNOSIS — F31.0 BIPOLAR AFFECTIVE DISORDER, CURRENT EPISODE HYPOMANIC: Primary | ICD-10-CM

## 2017-10-26 DIAGNOSIS — Z98.84 GASTRIC BYPASS STATUS FOR OBESITY: ICD-10-CM

## 2017-10-26 DIAGNOSIS — D50.0 IRON DEFICIENCY ANEMIA DUE TO CHRONIC BLOOD LOSS: ICD-10-CM

## 2017-10-26 DIAGNOSIS — Z98.890 S/P MVR (MITRAL VALVE REPAIR): ICD-10-CM

## 2017-10-26 DIAGNOSIS — I27.20 PULMONARY HYPERTENSION: ICD-10-CM

## 2017-10-26 PROCEDURE — 99499 UNLISTED E&M SERVICE: CPT | Mod: S$GLB,,, | Performed by: INTERNAL MEDICINE

## 2017-10-26 PROCEDURE — 99999 PR PBB SHADOW E&M-EST. PATIENT-LVL III: CPT | Mod: PBBFAC,,, | Performed by: INTERNAL MEDICINE

## 2017-10-26 PROCEDURE — 99214 OFFICE O/P EST MOD 30 MIN: CPT | Mod: S$GLB,,, | Performed by: INTERNAL MEDICINE

## 2017-10-26 NOTE — PROGRESS NOTES
Subjective:       Patient ID: Dulce Castro is a 72 y.o. female.    Chief Complain  HPI:   A 70-year-old  woman, very well known to me. The patient has pulmonary  hypertension, coronary artery disease, anemia, neutropenia and had a history of  gastric bypass and bipolar disorder. She has been following clinically. She   states her pulmonary hypertension has stabilized. She follows with   pulmonologist, and she follows with Cardiology as well.lately the tegretol has been lowered. Not drinking, no other drugs. Feeling better now since dc from hospital 12/23, occult blood was positive, pt elected for op scopes, feeling tired on and off   REVIEW OF SYSTEMS:   Has pacemaker  No CP occ palpitations, feels SOB on minial activity from pulmonary HTN, c/o of headaches, has an appt with a headache specialist   denies abd pain, nausea, vomiting, cough or sputum production   occ feet swelling  Not had a capsule study  PHYSICAL EXAM:     Wt Readings from Last 3 Encounters:   07/31/17 54 kg (119 lb 0.8 oz)   07/25/17 53.9 kg (118 lb 13.3 oz)   06/26/17 55 kg (121 lb 4.1 oz)     Temp Readings from Last 3 Encounters:   07/31/17 98.1 °F (36.7 °C)   07/25/17 99 °F (37.2 °C)   03/23/17 98.7 °F (37.1 °C)     BP Readings from Last 3 Encounters:   07/31/17 (!) 140/69   07/25/17 130/63   06/26/17 (!) 159/68     Pulse Readings from Last 3 Encounters:   07/31/17 71   07/25/17 63   06/26/17 74     GENERAL: Comfortable looking patient. Patient is in no distress.  Awake, alert and oriented to time, person and place.  No anxiety, or agitation.      HEENT: Normal conjunctivae and eyelids. WNL.  PERRLA 3 to 4 mm. No icterus, no pallor, no congestion, and no discharge noted.     NECK:  Supple. Trachea is central.  No crepitus.  No JVD or masses.    RESPIRATORY:  No intercostal retractions.  No dullness to percussion.  Chest is clear to auscultation.  No rales, rhonchi or wheezes.  No crepitus.  Good air entry  bilaterally.    CARDIOVASCULAR:  S1 and S2 are normally heard without murmurs or gallops.  All peripheral pulses are present.    ABDOMEN:  Normal abdomen.  No hepatosplenomegaly.  No free fluid.  Bowel sounds are present.  No hernia noted. No masses.  No rebound or tenderness.  No guarding or rigidity.  Umbilicus is midline.    LYMPHATICS:  No axillary, cervical, supraclavicular, submental, or inguinal lymphadenopathy.    SKIN/MUSCULOSKELETAL:  There is no evidence of excoriation marks or ecchmosis.  No rashes.  No cyanosis.  No clubbing.  No joint or skeletal deformities noted.  Normal range of motion.    NEUROLOGIC:  Higher functions are appropriate.  No cranial nerve deficits.  Normal sebastián.  Normal strength.  Motor and sensory functions are normal.  Deep tendon reflexes are normal.    GENITAL/RECTAL:  Exams are deferred.      Laboratory:     CBC:  Lab Results   Component Value Date    WBC 4.16 10/20/2017    RBC 3.46 (L) 10/20/2017    HGB 10.8 (L) 10/20/2017    HCT 32.6 (L) 10/20/2017    MCV 94 10/20/2017    MCH 31.2 (H) 10/20/2017    MCHC 33.1 10/20/2017    RDW 11.8 10/20/2017     10/20/2017    MPV 10.9 10/20/2017    GRAN 2.2 10/20/2017    GRAN 53.3 10/20/2017    LYMPH 1.3 10/20/2017    LYMPH 30.8 10/20/2017    MONO 0.5 10/20/2017    MONO 12.5 10/20/2017    EOS 0.1 10/20/2017    BASO 0.04 10/20/2017    EOSINOPHIL 2.4 10/20/2017    BASOPHIL 1.0 10/20/2017       BMP: BMP  Lab Results   Component Value Date     10/20/2017    K 3.2 (L) 10/20/2017    CL 90 (L) 10/20/2017    CO2 38 (H) 10/20/2017    BUN 23 (H) 10/20/2017    CREATININE 0.86 10/20/2017    CALCIUM 8.8 10/20/2017    ANIONGAP 8 10/20/2017    ESTGFRAFRICA >60 10/20/2017    EGFRNONAA >60 10/20/2017       LFT:   Lab Results   Component Value Date    ALT 40 10/20/2017    AST 28 10/20/2017    ALKPHOS 74 10/20/2017    BILITOT 0.3 10/20/2017     Lab Results   Component Value Date    GAEKGEMX61 >1000 (H) 10/20/2017     Haptoglobin was  low  Assessment/Plan:   hgb has stabilized, may have had a component of hemolysis.   Vish neg haptoglobin has normalsied .   Cbc, cmp   hbg stable   rtc 4 months with cbc, cmp,    bipolar dz: well controlled, chronic hradaches? Hypoxic, vascular, seeing headache specialist  Tegretol has been given by Dr Ness Haji for bipolar dz.

## 2017-11-02 ENCOUNTER — TELEPHONE (OUTPATIENT)
Dept: NEUROLOGY | Facility: CLINIC | Age: 72
End: 2017-11-02

## 2017-11-02 NOTE — TELEPHONE ENCOUNTER
----- Message from Deborah Og sent at 10/31/2017  1:19 PM CDT -----  Contact: Patient  Patient needs to reschedule her appointment for today at 2:00, she is unable to make it.  Call Back#[phone]  Thanks

## 2017-11-02 NOTE — TELEPHONE ENCOUNTER
Returned call and spoke with patient. Appointment rescheduled to 11/7 at 2 pm. Patient verbalized understanding.

## 2017-11-07 ENCOUNTER — OFFICE VISIT (OUTPATIENT)
Dept: NEUROLOGY | Facility: CLINIC | Age: 72
End: 2017-11-07
Payer: MEDICARE

## 2017-11-07 VITALS
HEART RATE: 97 BPM | WEIGHT: 121.69 LBS | DIASTOLIC BLOOD PRESSURE: 72 MMHG | HEIGHT: 64 IN | RESPIRATION RATE: 15 BRPM | BODY MASS INDEX: 20.78 KG/M2 | SYSTOLIC BLOOD PRESSURE: 164 MMHG

## 2017-11-07 DIAGNOSIS — I27.29 PULMONARY HYPERTENSIVE VENOUS DISEASE: ICD-10-CM

## 2017-11-07 DIAGNOSIS — G43.719 INTRACTABLE CHRONIC MIGRAINE WITHOUT AURA AND WITHOUT STATUS MIGRAINOSUS: Primary | ICD-10-CM

## 2017-11-07 DIAGNOSIS — F31.9 BIPOLAR AFFECTIVE DISORDER, REMISSION STATUS UNSPECIFIED: ICD-10-CM

## 2017-11-07 DIAGNOSIS — Z98.84 GASTRIC BYPASS STATUS FOR OBESITY: ICD-10-CM

## 2017-11-07 DIAGNOSIS — Z98.890 S/P MVR (MITRAL VALVE REPAIR): ICD-10-CM

## 2017-11-07 PROCEDURE — 99214 OFFICE O/P EST MOD 30 MIN: CPT | Mod: S$GLB,,, | Performed by: PSYCHIATRY & NEUROLOGY

## 2017-11-07 PROCEDURE — 99499 UNLISTED E&M SERVICE: CPT | Mod: S$GLB,,, | Performed by: PSYCHIATRY & NEUROLOGY

## 2017-11-07 PROCEDURE — 99999 PR PBB SHADOW E&M-EST. PATIENT-LVL III: CPT | Mod: PBBFAC,,, | Performed by: PSYCHIATRY & NEUROLOGY

## 2017-11-07 RX ORDER — TIZANIDINE 4 MG/1
4 TABLET ORAL EVERY 8 HOURS PRN
COMMUNITY
Start: 2017-10-15 | End: 2017-12-19 | Stop reason: SDUPTHER

## 2017-11-07 RX ORDER — GABAPENTIN 300 MG/1
300 CAPSULE ORAL 3 TIMES DAILY
Qty: 90 CAPSULE | Refills: 11 | Status: SHIPPED | OUTPATIENT
Start: 2017-11-07 | End: 2018-11-16 | Stop reason: SDUPTHER

## 2017-11-07 NOTE — PROGRESS NOTES
"Subjective:       Patient ID: Dulce Castro is a 72 y.o. female.    Chief Complaint: Headache  INTERVAL HISTORY  The patient presents for follow up and reports improvement. She has only had 3 or 4 headaches level "10." Otherwise mild to moderate pain. The headache "never goes away."Nausea persists.  She is on Neurontin 200 mg TID for prevention with excellent tolerability. She is on tylenol for escalations.When she was not sure if Neurontin was working and stopped taking, she developed diffuse body itching.   HPI   The patient is a pleasant 71 y/o female presenting with chief complain of headache. They started last August 2016, they are now daily and quite disabling. They are located above the eyes. While she suffers from dry eyes, the ophthalmologist told her that this is not the cause of her headaches. Likewise, the ENT has excluded sinus pathology responsible for the headaches. She has had 3 CT of the head, two in August 2016 and one in December 2016, all of which are negative. She is anemic and low in iron requiring infusions from time to time. Her medical history is quite complicated because of cardiopulmonary co morbidity. She suffers with Pulmonary Hypertension, she is oxygen dependent and is status post mitral valve prolapse and repair. In addition she has an implanted insertional dual pacemaker. She has been on Tegretol, Desyrel and Xanax for a long time for Bipolar Disorder. In addition, the patient was in a coma for 3 days, unknown origin. She is status post bariatric surgery going from 220 lbs to 120 lbs.  Please see details of headache characteristics headache below.    Headache questionnaire     1. When did your Headaches start?       August 2016      2. Where are your headaches located?                        Around and above the eyes        3. Your headache's characteristics:                        Pressure, Throbbing, Pounding     4. How long does the headache last?                        " Days        5. How often does the headache occur?                        daily        6. Are your headaches preceded or accompanied by other symptoms? no                        If yes, please describe.  N/A        7. Does the headache awaken you at night? no                        If so, how often? N/A           8. Please jeremias the word that best describes your headache's intensity:                         severe        9. Using a scale of 1 through 10, with 0 = no pain and 10 = the worst pain:                        What score is your headache now? 8                        What score is your headache at its worst? 10                        What score is your headache at its best? 5           10. Possible associated headache symptoms:  [x]  Sensitivity to light                                      [x] Dizziness                                        [] Nasal or sinus pressure/ pain                        [x] Sensitivity to noise                                     [] Vertigo                                            [] Problems with concentration  [] Sensitivity to smells             [x] Ringing in ears                     [] Problems with memory                                            [x] Blurred vision                                 [] Irritability                                         [] Problems with task completion   [] Double vision                                 [] Anger                                  [x]  Problems with relaxation  [] Loss of appetite                                         [x] Anxiety                                           [x] Neck tightness, Neck pain  [x] Nausea                                                                 [] Nasal congestion  [] Vomiting                                                                       11. Headache improving factors:  [x] Sleep                                  [] Heat  [x] Darkness                                        [] Ice  [] Local  pressure                     [] Menses (period)  [] Massage                                        [] Medications:          12. Headache worsening factors:   [x] Fatigue                     [] Sneezing                                        [] Changes in Weather  [x] Light             [] Bending Over           [x] Stress  [x] Noise            [] Ovulation                                        [] Multiple Sclerosis Flare-Up  [] Smells                      [] Menses                                          [] Food   [] Coughing                  [] Alcohol        13. Number of caffeinated drinks per day: 1        14. Number of diet drinks per day:  4        15. Have you seen any other [facility]ists within the last 3 years?  no         Please Check any Medications Tried for Headache     AED Neuromodulators   MAOIs   Ergot Alkaloids     Acetazolamide (Diamox) [] Phenelzine (Nardil) [] Dihydroergotamine (Migranal) []   Carbamazepine (Tegretol) [x] Tranylcypromine (Parnate) [] Ergotamine (Ergomar) []   Gabapentin (Neurontin) [x] Antihistamine/Serotonergic   Triptans     Lacosamide (Vimpat) [] Cyproheptadine (Periactin) [] Almotriptan (Axert) []   Lamotrigine (Lamictal) [] Antihypertensives   Eletriptan (Relpax) []   Levatiracetam (Keppra) [] Atenolol (Tenormin) [] Frovatriptan (Frova) []   Oxcarbazepine (Trileptal) [] Bisoprostol (Zebeta) [] Naratriptan (Amerge) []   Phenobarbital [] Candesartan (Atacand) [] Rizatriptan (Maxalt) []   Phenytoin (Dilantin) [] Diltiazem (Cardizem) [] Sumatriptan (Imitrex) []   Pregabalin (Lyrica) [] Lisinopril (Prinivil, Zestril) [] Zolmitriptan (Zomig) []   Primidone (Mysoline) [] Metoprolol (Toprol) [] Combo Abortives     Tiagabine (Gabatril) [] Nadolol (Corgard) [] Butalbital and Acetaminophen (Bupap) []   Topiramate (Topamax)  (Trokendi) [] Nicardipine (Cardene) []     Vigabatrin (Sabril) [] Nimodipine (Nimotop) [] Butalbital, Acetaminophen, and caffeine (Fioricet) []   Valproic  Acid (Depakote) (Divalproex Sodium) [] Propranolol (Inderal) []     Zonisamide (Zonegran) [] Telmisartan (Micardis) [] Butalbital, Aspirin, and caffeine (Fiorinal) []   Benzodiazepines   Timolol (Blocadren) []     Alprazolam (Xanax) [] Verapamil (Calan, Verelan) [] Butalbital, Caffeine, Acetaminophen, and Codeine (Fioricet with Codeine) []   Diazepam (Valium) [] NSAIDs       Lorazepam (Ativan) [] Acetaminophen (Tylenol) [x]     Clonazepam (Klonopin) [] Acetylsalicylic Acid (Aspirin) [] Butalbital, Caffeine, Aspirin, and Codeine  (Fiorinal with Codeine) []   Antidepressants   Diclofenac (Cambia) []     Amitriptyline (Elavil) [] Ibuprofen (Motrin) [x]     Desipramine (Norpramin) [] Indomethacin (Indocin) [] Aspirin, Caffeine, and Acetaminophen (Excedrin) (Goodys) [x]   Doxepin (Sinequan) [] Ketoprofen (Orudis) []     Fluoxetine (Prozac) [] Ketorolac (Toradol) [] Acetaminophen, Dichloralphenazone, and Isometheptene (Midrin) []   Imipramine (Tofranil) [] Naproxen (Anaprox) (Aleve) [x]     Nortriptyline (Pamelor) [] Meclofenamic Acid (Meclomen) []     Venlafaxine (Effexor) [x] Meloxicam (Mobic) [] Aspirin, Salicylamide, and Caffeine (BC Powder) [x]                           Review of Systems   Constitutional: Positive for fatigue. Negative for activity change, appetite change and fever.   HENT: Positive for tinnitus. Negative for congestion, dental problem, hearing loss, sinus pressure, trouble swallowing and voice change.    Eyes: Positive for visual disturbance. Negative for photophobia, pain and redness.   Respiratory: Negative for cough, chest tightness and shortness of breath.    Cardiovascular: Negative for chest pain, palpitations and leg swelling.   Gastrointestinal: Negative for abdominal pain, blood in stool, nausea and vomiting.   Endocrine: Negative for cold intolerance and heat intolerance.   Genitourinary: Negative for difficulty urinating, frequency, menstrual problem and urgency.   Musculoskeletal:  Positive for arthralgias, myalgias and neck pain. Negative for back pain, gait problem, joint swelling and neck stiffness.   Skin: Negative.    Neurological: Positive for weakness and headaches. Negative for dizziness, tremors, seizures, syncope, facial asymmetry, speech difficulty, light-headedness and numbness.   Hematological: Negative for adenopathy. Does not bruise/bleed easily.   Psychiatric/Behavioral: Negative for agitation, behavioral problems, confusion, decreased concentration, self-injury, sleep disturbance and suicidal ideas. The patient is not nervous/anxious and is not hyperactive.          Past Medical History:   Diagnosis Date    Allergy     Anemia     Anticoagulant long-term use     Anxiety     Arthritis     Bipolar disorder     Blood transfusion     Carpal tunnel syndrome     Cataract     CHF (congestive heart failure)     Depression     VEE (dyspnea on exertion)     general activity    GERD (gastroesophageal reflux disease)     Heart murmur     Hypertension     Irritable bladder     Meningitis     Mitral valve prolapse     On home O2     @ 3 liters    TEE (obstructive sleep apnea)     Pulmonary HTN     Thyroid disease      Past Surgical History:   Procedure Laterality Date     bladder stimulator      [employer]    ABDOMINAL SURGERY      APPENDECTOMY      CARDIAC PACEMAKER PLACEMENT  10/2016    CARDIAC SURGERY  2007    MVR    CARPAL TUNNEL RELEASE Right     CHOLECYSTECTOMY  2010    COLONOSCOPY N/A 2/17/2017    Procedure: COLONOSCOPY;  Surgeon: Artie Mills Jr., MD;  Location[facility];  Service: Endoscopy;  Laterality: N/A;    ESOPHAGOGASTRODUODENOSCOPY  09/14/2011    BEAU.   Normal esophagus.  NERD.  Gastric bypass.  Normal anastomosis.  Normal jejunum.    EYE SURGERY Bilateral     cataracts    FOOT SURGERY Right     2 Hematomas on nerves    GASTRIC BYPASS  2004    HYSTERECTOMY      separate procedures for uterus, ovaries and then an ex-lap for adhesions     open heart surgery      mitral valve repair     Family History   Problem Relation Age of Onset    Heart disease Mother     Heart disease Father     Heart disease Brother     Collagen disease Neg Hx      Social History     Social History    Marital status:      Spouse name: N/A    Number of children: N/A    Years of education: N/A     Occupational History    Not on file.     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     Review of patient's allergies indicates:   Allergen Reactions    Morphine Other (See Comments)     Difficulty swallowing    Tramadol Other (See Comments)     itching       Current Outpatient Prescriptions:     alprazolam (XANAX) 1 MG tablet, Take 1 mg by mouth 4 (four) times daily as needed., Disp: , Rfl:     carbamazepine (TEGRETOL) 200 mg tablet, Take 200 mg by mouth 2 (two) times daily., Disp: , Rfl:     CYANOCOBALAMIN/COBAMAMIDE (B12 SL), Place under the tongue., Disp: , Rfl:     diphenhydrAMINE (BENADRYL) 25 mg capsule, Take 1 each (25 mg total) by mouth daily as needed for Itching., Disp: , Rfl: 0    furosemide (LASIX) 40 MG tablet, Take 1 tablet (40 mg total) by mouth Every 3 (three) days. Hold if sbp < 100, Disp: , Rfl:     levothyroxine (SYNTHROID) 112 MCG tablet, Take 1 tablet (112 mcg total) by mouth once daily., Disp: 30 tablet, Rfl: 11    metOLazone (ZAROXOLYN) 5 MG tablet, Take 0.5 tablets (2.5 mg total) by mouth once a week. Hold if sbp < 100, Disp: , Rfl:     OXYGEN-AIR DELIVERY SYSTEMS MISC, 3 L by Nasal route Daily. uses hs and prn for SOB, Disp: , Rfl:      potassium chloride SA (K-DUR,KLOR-CON) 20 MEQ tablet, Take 1 tablet (20 mEq total) by mouth once daily. X 3 days (Patient taking differently: Take 20 mEq by mouth once daily. X 3 days  Takes with lasix), Disp: 3 tablet, Rfl: 0    promethazine (PHENERGAN) 25 MG suppository, Place 1 suppository (25 mg total) rectally every 6 (six) hours as needed for Nausea., Disp: 30 suppository, Rfl: 2    trazodone (DESYREL) 300 MG tablet, Take 0.5 tablets (150 mg total) by mouth nightly as needed for Insomnia. (Patient taking differently: Take 300 mg by mouth nightly. ), Disp: 30 tablet, Rfl: 0    venlafaxine (EFFEXOR-XR) 75 MG 24 hr capsule, 75 mg. Three tablets daily 225 mg, Disp: , Rfl:       Objective:      Vitals:    11/07/17 1335   BP: (!) 164/72   Pulse: 97   Resp: 15     Body mass index is 20.89 kg/m².    Physical Exam    Constitutional:   She appears well-developed, looks frail. She is well groomed    HENT:    Head: Atraumatic, oral and nasal mucosa intact  Eyes: Conjunctivae slightly hyperemic, EOM are normal. Pupils are equal, round, and reactive to light OU  Neck: Neck supple. No thyromegaly present  Cardiovascular: Normal rate and normal heart sounds  No murmur heard  Pulmonary/Chest: Effort normal and breath sounds normal  Skin: Skin is warm and dry  Psychiatric: Normal mood and affect     Neuro exam:    Mental status:  Awake, attentive, Alert, oriented to self, place, year and month  Language function is intactt      Cranial Nerves:  Smell was not formally evaluated  Cranial Nerves II - XII: intact  Pursuits were smooth, normal saccades, no nystagmus OU  Funduscopic exam - disc were flat and pink, no exudates or hemorrhages OU  Motor - facial movement was symmetrical and normal     Palate moved well and was symmetrical with normal palatal and oral sensation  Tongue movements were full    Coordination:     Rapid alternating movements and rapid finger tapping - normal bilaterally  Finger to nose - normal and  symmetric bilaterally   Arm roll - smooth and symmetric   No intentional or positional tremor.     Motor:  Normal muscle bulk and symmetry. No fasciculations were noted    No pronator drift  Strength 5/5 bilaterally     Reflexes:  Tendon reflexes were 2 + at biceps, triceps, brachioradialis, patellar, and Achilles bilaterally  On plantar stimulation toes were down going bilaterally  No clonus was noted     Sensory: Intact to light touch, pin prick in all extremities.     Gait: Romberg absent. Slow pace, mild stooped posture. Normal arm swing and turns.     Review of Data: Head CT x 3 all negative. Iron deficiency anemia. Abnormal ECHO        Assessment and Plan   Very complicated case due to underlying co morbidities which influence therapeutic decisions. Overall improved on Gabapentin. Will increase Gabapentin to 300 mg TID. If no response, will proceed with Botox  Pulmonary Hypertension  Oxygen dependent  Status post Bariatric surgery  Multiple other co morbidities as listed in the problem list    I have discussed the side effects of the medications prescribed and the patient acknowledges understanding    Counseling:  More than 50% of the 25 minute encounter was spent face to face counseling the patient regarding current status and future plan of care as well as side of the medications. All questions were answered to patient's satisfaction    Yousif Nelson M.D  Medical Director, Headache and Facial Pain  [facility]

## 2017-11-16 ENCOUNTER — CLINICAL SUPPORT (OUTPATIENT)
Dept: CARDIOLOGY | Facility: CLINIC | Age: 72
End: 2017-11-16
Payer: MEDICARE

## 2017-11-16 DIAGNOSIS — I49.5 SSS (SICK SINUS SYNDROME): ICD-10-CM

## 2017-11-16 DIAGNOSIS — Z95.0 CARDIAC PACEMAKER IN SITU: ICD-10-CM

## 2017-11-16 PROCEDURE — 93280 PM DEVICE PROGR EVAL DUAL: CPT | Mod: S$GLB,,, | Performed by: INTERNAL MEDICINE

## 2017-12-18 ENCOUNTER — OFFICE VISIT (OUTPATIENT)
Dept: CARDIOLOGY | Facility: CLINIC | Age: 72
End: 2017-12-18
Payer: MEDICARE

## 2017-12-18 VITALS
WEIGHT: 119.5 LBS | BODY MASS INDEX: 21.85 KG/M2 | HEART RATE: 64 BPM | DIASTOLIC BLOOD PRESSURE: 69 MMHG | SYSTOLIC BLOOD PRESSURE: 133 MMHG

## 2017-12-18 DIAGNOSIS — I25.10 CORONARY ARTERY DISEASE INVOLVING NATIVE CORONARY ARTERY OF NATIVE HEART WITHOUT ANGINA PECTORIS: Primary | ICD-10-CM

## 2017-12-18 DIAGNOSIS — Z95.0 PACEMAKER: ICD-10-CM

## 2017-12-18 DIAGNOSIS — Z98.890 S/P MVR (MITRAL VALVE REPAIR): ICD-10-CM

## 2017-12-18 DIAGNOSIS — I10 HYPERTENSION, UNSPECIFIED TYPE: ICD-10-CM

## 2017-12-18 PROCEDURE — 99499 UNLISTED E&M SERVICE: CPT | Mod: S$GLB,,, | Performed by: INTERNAL MEDICINE

## 2017-12-18 PROCEDURE — 99999 PR PBB SHADOW E&M-EST. PATIENT-LVL II: CPT | Mod: PBBFAC,,, | Performed by: INTERNAL MEDICINE

## 2017-12-18 PROCEDURE — 99214 OFFICE O/P EST MOD 30 MIN: CPT | Mod: S$GLB,,, | Performed by: INTERNAL MEDICINE

## 2017-12-18 RX ORDER — GABAPENTIN 100 MG/1
CAPSULE ORAL
COMMUNITY
Start: 2017-12-05 | End: 2017-12-19 | Stop reason: DRUGHIGH

## 2017-12-18 RX ORDER — CYCLOSPORINE 0.5 MG/ML
1 EMULSION OPHTHALMIC 2 TIMES DAILY
COMMUNITY
Start: 2017-12-04 | End: 2019-03-01

## 2017-12-18 NOTE — PROGRESS NOTES
Subjective:    Patient ID:  Dulce Castro is a 72 y.o. female who presents for follow-up of MVR    HPI  She comes with no complaints, no chest pain, no shortness of breath  FC II  Main problem is back pain, constant    Review of Systems   Constitution: Negative for decreased appetite, weakness, malaise/fatigue, weight gain and weight loss.   Cardiovascular: Negative for chest pain, dyspnea on exertion, leg swelling, palpitations and syncope.   Respiratory: Negative for cough and shortness of breath.    Gastrointestinal: Negative.    All other systems reviewed and are negative.       Objective:    Physical Exam   Constitutional: She is oriented to person, place, and time. She appears well-developed and well-nourished.   HENT:   Head: Normocephalic.   Eyes: Pupils are equal, round, and reactive to light.   Neck: Normal range of motion. Neck supple. No JVD present. Carotid bruit is not present. No thyromegaly present.   Cardiovascular: Normal rate, regular rhythm, intact distal pulses and normal pulses.  PMI is not displaced.  Exam reveals no gallop.    Murmur heard.  High-pitched blowing holosystolic murmur is present with a grade of 3/6  at the apex  Pulmonary/Chest: Effort normal and breath sounds normal.   Abdominal: Soft. Normal appearance. She exhibits no mass. There is no hepatosplenomegaly. There is no tenderness.   Musculoskeletal: Normal range of motion. She exhibits no edema.   Neurological: She is alert and oriented to person, place, and time. She has normal strength and normal reflexes. No sensory deficit.   Skin: Skin is warm and intact.   Psychiatric: She has a normal mood and affect.   Nursing note and vitals reviewed.        Assessment:       1. Coronary artery disease involving native coronary artery of native heart without angina pectoris    2. S/P MVR (mitral valve repair)    3. Pacemaker    4. Hypertension, unspecified type         Plan:     Continue all cardiac medications  Regular exercise  program  9 m f/u

## 2017-12-19 ENCOUNTER — LAB VISIT (OUTPATIENT)
Dept: LAB | Facility: HOSPITAL | Age: 72
End: 2017-12-19
Attending: NURSE PRACTITIONER
Payer: MEDICARE

## 2017-12-19 ENCOUNTER — OFFICE VISIT (OUTPATIENT)
Dept: FAMILY MEDICINE | Facility: CLINIC | Age: 72
End: 2017-12-19
Payer: MEDICARE

## 2017-12-19 VITALS
HEIGHT: 62 IN | HEART RATE: 65 BPM | OXYGEN SATURATION: 99 % | WEIGHT: 119.94 LBS | DIASTOLIC BLOOD PRESSURE: 60 MMHG | RESPIRATION RATE: 16 BRPM | TEMPERATURE: 99 F | SYSTOLIC BLOOD PRESSURE: 120 MMHG | BODY MASS INDEX: 22.07 KG/M2

## 2017-12-19 DIAGNOSIS — F31.9 BIPOLAR AFFECTIVE DISORDER, REMISSION STATUS UNSPECIFIED: ICD-10-CM

## 2017-12-19 DIAGNOSIS — E87.6 HYPOKALEMIA: Primary | ICD-10-CM

## 2017-12-19 DIAGNOSIS — E87.6 HYPOKALEMIA: ICD-10-CM

## 2017-12-19 DIAGNOSIS — M54.2 NECK PAIN: ICD-10-CM

## 2017-12-19 DIAGNOSIS — I27.20 PULMONARY HYPERTENSION: ICD-10-CM

## 2017-12-19 LAB
ALBUMIN SERPL BCP-MCNC: 4 G/DL
ALP SERPL-CCNC: 100 U/L
ALT SERPL W/O P-5'-P-CCNC: 33 U/L
ANION GAP SERPL CALC-SCNC: 8 MMOL/L
AST SERPL-CCNC: 21 U/L
BILIRUB SERPL-MCNC: 0.4 MG/DL
BUN SERPL-MCNC: 24 MG/DL
CALCIUM SERPL-MCNC: 9.5 MG/DL
CHLORIDE SERPL-SCNC: 93 MMOL/L
CO2 SERPL-SCNC: 39 MMOL/L
CREAT SERPL-MCNC: 1 MG/DL
EST. GFR  (AFRICAN AMERICAN): >60 ML/MIN/1.73 M^2
EST. GFR  (NON AFRICAN AMERICAN): 56 ML/MIN/1.73 M^2
GLUCOSE SERPL-MCNC: 84 MG/DL
POTASSIUM SERPL-SCNC: 3.4 MMOL/L
PROT SERPL-MCNC: 7.2 G/DL
SODIUM SERPL-SCNC: 140 MMOL/L

## 2017-12-19 PROCEDURE — 99999 PR PBB SHADOW E&M-EST. PATIENT-LVL IV: CPT | Mod: PBBFAC,,, | Performed by: NURSE PRACTITIONER

## 2017-12-19 PROCEDURE — 99499 UNLISTED E&M SERVICE: CPT | Mod: S$GLB,,, | Performed by: NURSE PRACTITIONER

## 2017-12-19 PROCEDURE — 80053 COMPREHEN METABOLIC PANEL: CPT

## 2017-12-19 PROCEDURE — 99214 OFFICE O/P EST MOD 30 MIN: CPT | Mod: S$GLB,,, | Performed by: NURSE PRACTITIONER

## 2017-12-19 PROCEDURE — 36415 COLL VENOUS BLD VENIPUNCTURE: CPT | Mod: PO

## 2017-12-19 RX ORDER — TIZANIDINE 4 MG/1
4 TABLET ORAL EVERY 8 HOURS PRN
Qty: 30 TABLET | Refills: 1 | Status: SHIPPED | OUTPATIENT
Start: 2017-12-19 | End: 2018-01-23 | Stop reason: SDUPTHER

## 2017-12-19 NOTE — PROGRESS NOTES
Subjective:       Patient ID: Dulce Castro is a 72 y.o. female.    Chief Complaint: Dry Eye; Medication Refill (flexeril-omega 3/no more than 2000mg/day); and Altered Mental Status (ER f/u)    HPI   Ms. Castro is a known patient to me. She presents today for ED follow up. Seen at [facility] on 12/2/17 for lethargy--patient fell asleep,  attempted to wake but unable, called EMS--BP on scene 70s/30s. Patient awake at ED---had taken nightly medication (xanax and trazodone) as normal. History includes Bipolar disorder and pulmonary hypertension--she takes Lasix q 3 days--unsure if she took lasix that day. She reports no medications have changed recently. She was hypokalemic in ED--replaced and DCd home. Take Kdur 20mEq daily. Compliant with meds. Today she is without complaints.     Seen by cardiology yesterday, routine FU again in 9 months   Followed by Son for pulm htn   Vitals:    12/19/17 1003   BP: 120/60   Pulse: 65   Resp: 16   Temp: 98.6 °F (37 °C)     Review of Systems   Constitutional: Negative.  Negative for diaphoresis and fever.   HENT: Negative.  Negative for facial swelling and trouble swallowing.    Eyes: Negative.  Negative for discharge and redness.   Respiratory: Negative.  Negative for cough and shortness of breath.    Cardiovascular: Negative.  Negative for chest pain and palpitations.   Gastrointestinal: Negative.  Negative for abdominal pain and vomiting.   Genitourinary: Negative.  Negative for difficulty urinating and flank pain.   Musculoskeletal: Positive for neck pain and neck stiffness.   Skin: Negative.  Negative for rash and wound.   Neurological: Negative.  Negative for dizziness and light-headedness.   Hematological: Negative.    Psychiatric/Behavioral: Negative.  Negative for confusion. The patient is not nervous/anxious.        Past Medical History:   Diagnosis Date    Allergy     Anemia     Anticoagulant long-term use     Anxiety     Arthritis     Bipolar disorder      Blood transfusion     Carpal tunnel syndrome     Cataract     CHF (congestive heart failure)     Depression     VEE (dyspnea on exertion)     general activity    GERD (gastroesophageal reflux disease)     Heart murmur     Hypertension     Irritable bladder     Meningitis     Mitral valve prolapse     On home O2     @ 3 liters    TEE (obstructive sleep apnea)     Pulmonary HTN     Thyroid disease      Objective:      Physical Exam   Constitutional: She is oriented to person, place, and time. She does not have a sickly appearance. No distress.   HENT:   Head: Normocephalic.   Right Ear: Hearing normal.   Left Ear: Hearing normal.   Nose: Nose normal.   Eyes: Conjunctivae and lids are normal.   Neck: Trachea normal and normal range of motion. Neck supple. No JVD present. No tracheal deviation present.   Cardiovascular: Normal rate, regular rhythm, S1 normal, S2 normal and normal heart sounds.    Negative for lower extremity edema   Pulmonary/Chest: Effort normal and breath sounds normal. She exhibits no tenderness.   Abdominal: Normal appearance. She exhibits no distension.   Musculoskeletal: Normal range of motion. She exhibits no edema or deformity.        Cervical back: She exhibits pain.   Neurological: She is alert and oriented to person, place, and time.   Skin: Skin is warm and dry. She is not diaphoretic. No pallor.   Psychiatric: She has a normal mood and affect. Her speech is normal and behavior is normal. Judgment and thought content normal. Cognition and memory are normal.   Nursing note and vitals reviewed.      Assessment:       1. Hypokalemia    2. Pulmonary hypertension    3. Bipolar affective disorder, remission status unspecified    4. Neck pain        Plan:       Hypokalemia  -     Comprehensive metabolic panel; Future; Expected date: 12/19/2017    Pulmonary hypertension    Bipolar affective disorder, remission status unspecified    Neck pain  -     tiZANidine (ZANAFLEX) 4 MG  tablet; Take 1 tablet (4 mg total) by mouth every 8 (eight) hours as needed.  Dispense: 30 tablet; Refill: 1      Recheck K today; advised to follow up with Dr Torres. Check BP at home--keep log--note days on which lasix is taken.     Extensive safety discussion  Follow up next available for annual with PCP--fasting labs prior  Follow up with me PRN

## 2017-12-20 ENCOUNTER — TELEPHONE (OUTPATIENT)
Dept: FAMILY MEDICINE | Facility: CLINIC | Age: 72
End: 2017-12-20

## 2017-12-20 DIAGNOSIS — E87.6 HYPOKALEMIA: Primary | ICD-10-CM

## 2017-12-21 RX ORDER — POTASSIUM CHLORIDE 20 MEQ/1
20 TABLET, EXTENDED RELEASE ORAL EVERY OTHER DAY
Qty: 30 TABLET | Refills: 0
Start: 2017-12-21 | End: 2018-01-23 | Stop reason: SDUPTHER

## 2018-01-22 ENCOUNTER — LAB VISIT (OUTPATIENT)
Dept: LAB | Facility: HOSPITAL | Age: 73
End: 2018-01-22
Attending: FAMILY MEDICINE
Payer: MEDICARE

## 2018-01-22 DIAGNOSIS — E87.6 HYPOKALEMIA: ICD-10-CM

## 2018-01-22 LAB — POTASSIUM SERPL-SCNC: 3 MMOL/L

## 2018-01-22 PROCEDURE — 84132 ASSAY OF SERUM POTASSIUM: CPT

## 2018-01-22 PROCEDURE — 36415 COLL VENOUS BLD VENIPUNCTURE: CPT | Mod: PO

## 2018-01-23 ENCOUNTER — OFFICE VISIT (OUTPATIENT)
Dept: FAMILY MEDICINE | Facility: CLINIC | Age: 73
End: 2018-01-23
Payer: MEDICARE

## 2018-01-23 VITALS
HEART RATE: 78 BPM | DIASTOLIC BLOOD PRESSURE: 58 MMHG | WEIGHT: 123.69 LBS | SYSTOLIC BLOOD PRESSURE: 99 MMHG | HEIGHT: 64 IN | OXYGEN SATURATION: 97 % | BODY MASS INDEX: 21.11 KG/M2

## 2018-01-23 DIAGNOSIS — M54.2 NECK PAIN: ICD-10-CM

## 2018-01-23 DIAGNOSIS — I27.20 PULMONARY HTN: ICD-10-CM

## 2018-01-23 DIAGNOSIS — Z00.00 ROUTINE MEDICAL EXAM: Primary | ICD-10-CM

## 2018-01-23 DIAGNOSIS — E87.6 HYPOKALEMIA: ICD-10-CM

## 2018-01-23 DIAGNOSIS — E78.5 HYPERLIPIDEMIA, UNSPECIFIED HYPERLIPIDEMIA TYPE: ICD-10-CM

## 2018-01-23 PROCEDURE — 99999 PR PBB SHADOW E&M-EST. PATIENT-LVL III: CPT | Mod: PBBFAC,,, | Performed by: FAMILY MEDICINE

## 2018-01-23 PROCEDURE — 99397 PER PM REEVAL EST PAT 65+ YR: CPT | Mod: S$GLB,,, | Performed by: FAMILY MEDICINE

## 2018-01-23 PROCEDURE — 99499 UNLISTED E&M SERVICE: CPT | Mod: S$GLB,,, | Performed by: FAMILY MEDICINE

## 2018-01-23 RX ORDER — TIZANIDINE 4 MG/1
4 TABLET ORAL EVERY 8 HOURS PRN
Qty: 60 TABLET | Refills: 2 | Status: SHIPPED | OUTPATIENT
Start: 2018-01-23 | End: 2018-03-08 | Stop reason: SDUPTHER

## 2018-01-23 RX ORDER — BUTALBITAL, ACETAMINOPHEN AND CAFFEINE 50; 325; 40 MG/1; MG/1; MG/1
1 TABLET ORAL 2 TIMES DAILY PRN
Qty: 30 TABLET | Refills: 0 | Status: SHIPPED | OUTPATIENT
Start: 2018-01-23 | End: 2018-02-09 | Stop reason: SDUPTHER

## 2018-01-23 RX ORDER — POTASSIUM CHLORIDE 20 MEQ/1
TABLET, EXTENDED RELEASE ORAL
Qty: 90 TABLET | Refills: 5 | Status: SHIPPED | OUTPATIENT
Start: 2018-01-23 | End: 2018-03-08 | Stop reason: SDUPTHER

## 2018-01-23 NOTE — PROGRESS NOTES
Subjective:       Patient ID: Dulce Castro is a 72 y.o. female.    Chief Complaint: Annual Exam ([facility] F/U ) and Headache    HPI     Here for a check up    Takes lasix with potassium 20 meq every 3 days for pulm htn. Sees Dr. Cline, pulmonologist, for management.     Recall that she went to ER last month and was found to have low potassium. Her potassium yesterday was 3.0.  Reports no generalized myalgia or chest pain.     Has daily bilat frontal migraines. No aura. Ps: 2-3/10. Sleep helps. Light/noise sensitive. Associated nausea. Sees neurology for management. Takes neurontin which has not reduced severity or frequency.      Has chronic neck pain with stiffness. Takes zanaflex 1-2 x per day for relief.       Review of Systems      Review of Systems   Constitutional: Negative for fever and chills.   HENT: Negative for hearing loss and neck stiffness.    Eyes: Negative for redness and itching.   Respiratory: Negative for cough and choking.    Cardiovascular: Negative for chest pain and leg swelling.  Abdomen: Negative for abdominal pain and blood in stool.   Genitourinary: Negative for dysuria and flank pain.   Neurological: Negative for light-headedness   Hematological: Negative for adenopathy.   Psychiatric/Behavioral: Negative for behavioral problems.       Objective:      Physical Exam   Constitutional: She is oriented to person, place, and time. She appears well-developed. No distress.   HENT:   Head: Normocephalic.   Mouth/Throat: Oropharynx is clear and moist.   Eyes: Pupils are equal, round, and reactive to light.   Neck: Normal range of motion. Neck supple. No thyromegaly present.   Cardiovascular: Normal rate and regular rhythm.  Exam reveals no friction rub.    Murmur heard.  High-pitched blowing holosystolic murmur is present with a grade of 3/6  at the apex   Pulmonary/Chest: Breath sounds normal. She has no wheezes. She has no rales.   Abdominal: Soft. Bowel sounds are normal. She exhibits no  distension and no mass. There is no tenderness.   Musculoskeletal:   Cervical spine: tend of bilat paravert area.  Dec rom with flex/ext.     Neurological: She is alert and oriented to person, place, and time. She has normal reflexes. No cranial nerve deficit.   Skin: Skin is warm. No rash noted. No erythema.   Psychiatric: She has a normal mood and affect. Thought content normal.       Assessment:       1. Routine medical exam    2. Neck pain    3. Pulmonary HTN    4. Hyperlipidemia, unspecified hyperlipidemia type    5. Hypokalemia        Plan:       Routine medical exam    Neck pain    Pulmonary HTN  -     Comprehensive metabolic panel; Future; Expected date: 01/23/2018  -     Lipid panel; Future; Expected date: 01/23/2018    Hyperlipidemia, unspecified hyperlipidemia type  -     Comprehensive metabolic panel; Future; Expected date: 01/23/2018  -     Lipid panel; Future; Expected date: 01/23/2018    Hypokalemia    Other orders  -     tiZANidine (ZANAFLEX) 4 MG tablet; Take 1 tablet (4 mg total) by mouth every 8 (eight) hours as needed.  Dispense: 60 tablet; Refill: 2  -     potassium chloride SA (K-DUR,KLOR-CON) 20 MEQ tablet; Take 2 tablets 1-2 times per day depending on potassium level  Dispense: 90 tablet; Refill: 5  -     butalbital-acetaminophen-caffeine -40 mg (FIORICET, ESGIC) -40 mg per tablet; Take 1 tablet by mouth 2 (two) times daily as needed for Pain.  Dispense: 30 tablet; Refill: 0      Plan:  Start fioricet for migraines  Take kdur 20 meq 2 tabs po bid x 3 days then 2 tabs daily   rf zanaflex  Cont all other meds  See orders      Medication List with Changes/Refills   New Medications    BUTALBITAL-ACETAMINOPHEN-CAFFEINE -40 MG (FIORICET, ESGIC) -40 MG PER TABLET    Take 1 tablet by mouth 2 (two) times daily as needed for Pain.   Current Medications    ALPRAZOLAM (XANAX) 1 MG TABLET    Take 1 mg by mouth 4 (four) times daily as needed.    CARBAMAZEPINE (TEGRETOL) 200 MG  TABLET    Take 200 mg by mouth 2 (two) times daily.    CYANOCOBALAMIN/COBAMAMIDE (B12 SL)    Place under the tongue.    DIPHENHYDRAMINE (BENADRYL) 25 MG CAPSULE    Take 1 each (25 mg total) by mouth daily as needed for Itching.    FUROSEMIDE (LASIX) 40 MG TABLET    Take 1 tablet (40 mg total) by mouth Every 3 (three) days. Hold if sbp < 100    GABAPENTIN (NEURONTIN) 300 MG CAPSULE    Take 1 capsule (300 mg total) by mouth 3 (three) times daily.    LEVOTHYROXINE (SYNTHROID) 112 MCG TABLET    Take 1 tablet (112 mcg total) by mouth once daily.    OXYGEN-AIR DELIVERY SYSTEMS MISC    3 L by Nasal route Daily. uses hs and prn for SOB    PROMETHAZINE (PHENERGAN) 25 MG SUPPOSITORY    Place 1 suppository (25 mg total) rectally every 6 (six) hours as needed for Nausea.    RESTASIS MULTIDOSE 0.05 % DROP    Place 1 drop into both eyes 2 (two) times daily.     TRAZODONE (DESYREL) 300 MG TABLET    Take 0.5 tablets (150 mg total) by mouth nightly as needed for Insomnia.    VENLAFAXINE (EFFEXOR-XR) 75 MG 24 HR CAPSULE    Take 225 mg by mouth once daily.    Changed and/or Refilled Medications    Modified Medication Previous Medication    POTASSIUM CHLORIDE SA (K-DUR,KLOR-CON) 20 MEQ TABLET potassium chloride SA (K-DUR,KLOR-CON) 20 MEQ tablet       Take 2 tablets 1-2 times per day depending on potassium level    Take 1 tablet (20 mEq total) by mouth every other day.    TIZANIDINE (ZANAFLEX) 4 MG TABLET tiZANidine (ZANAFLEX) 4 MG tablet       Take 1 tablet (4 mg total) by mouth every 8 (eight) hours as needed.    Take 1 tablet (4 mg total) by mouth every 8 (eight) hours as needed.                 Medication List with Changes/Refills   New Medications    BUTALBITAL-ACETAMINOPHEN-CAFFEINE -40 MG (FIORICET, ESGIC) -40 MG PER TABLET    Take 1 tablet by mouth 2 (two) times daily as needed for Pain.   Current Medications    ALPRAZOLAM (XANAX) 1 MG TABLET    Take 1 mg by mouth 4 (four) times daily as needed.    CARBAMAZEPINE  (TEGRETOL) 200 MG TABLET    Take 200 mg by mouth 2 (two) times daily.    CYANOCOBALAMIN/COBAMAMIDE (B12 SL)    Place under the tongue.    DIPHENHYDRAMINE (BENADRYL) 25 MG CAPSULE    Take 1 each (25 mg total) by mouth daily as needed for Itching.    FUROSEMIDE (LASIX) 40 MG TABLET    Take 1 tablet (40 mg total) by mouth Every 3 (three) days. Hold if sbp < 100    GABAPENTIN (NEURONTIN) 300 MG CAPSULE    Take 1 capsule (300 mg total) by mouth 3 (three) times daily.    LEVOTHYROXINE (SYNTHROID) 112 MCG TABLET    Take 1 tablet (112 mcg total) by mouth once daily.    OXYGEN-AIR DELIVERY SYSTEMS MISC    3 L by Nasal route Daily. uses hs and prn for SOB    PROMETHAZINE (PHENERGAN) 25 MG SUPPOSITORY    Place 1 suppository (25 mg total) rectally every 6 (six) hours as needed for Nausea.    RESTASIS MULTIDOSE 0.05 % DROP    Place 1 drop into both eyes 2 (two) times daily.     TRAZODONE (DESYREL) 300 MG TABLET    Take 0.5 tablets (150 mg total) by mouth nightly as needed for Insomnia.    VENLAFAXINE (EFFEXOR-XR) 75 MG 24 HR CAPSULE    Take 225 mg by mouth once daily.    Changed and/or Refilled Medications    Modified Medication Previous Medication    POTASSIUM CHLORIDE SA (K-DUR,KLOR-CON) 20 MEQ TABLET potassium chloride SA (K-DUR,KLOR-CON) 20 MEQ tablet       Take 2 tablets 1-2 times per day depending on potassium level    Take 1 tablet (20 mEq total) by mouth every other day.    TIZANIDINE (ZANAFLEX) 4 MG TABLET tiZANidine (ZANAFLEX) 4 MG tablet       Take 1 tablet (4 mg total) by mouth every 8 (eight) hours as needed.    Take 1 tablet (4 mg total) by mouth every 8 (eight) hours as needed.

## 2018-01-30 ENCOUNTER — LAB VISIT (OUTPATIENT)
Dept: LAB | Facility: HOSPITAL | Age: 73
End: 2018-01-30
Attending: FAMILY MEDICINE
Payer: MEDICARE

## 2018-01-30 DIAGNOSIS — E78.5 HYPERLIPIDEMIA, UNSPECIFIED HYPERLIPIDEMIA TYPE: ICD-10-CM

## 2018-01-30 DIAGNOSIS — I27.20 PULMONARY HTN: ICD-10-CM

## 2018-01-30 LAB
ALBUMIN SERPL BCP-MCNC: 4 G/DL
ALP SERPL-CCNC: 101 U/L
ALT SERPL W/O P-5'-P-CCNC: 28 U/L
ANION GAP SERPL CALC-SCNC: 8 MMOL/L
AST SERPL-CCNC: 33 U/L
BILIRUB SERPL-MCNC: 0.5 MG/DL
BUN SERPL-MCNC: 15 MG/DL
CALCIUM SERPL-MCNC: 9.4 MG/DL
CHLORIDE SERPL-SCNC: 97 MMOL/L
CHOLEST SERPL-MCNC: 224 MG/DL
CHOLEST/HDLC SERPL: 2.4 {RATIO}
CO2 SERPL-SCNC: 33 MMOL/L
CREAT SERPL-MCNC: 1 MG/DL
EST. GFR  (AFRICAN AMERICAN): >60 ML/MIN/1.73 M^2
EST. GFR  (NON AFRICAN AMERICAN): 56 ML/MIN/1.73 M^2
GLUCOSE SERPL-MCNC: 89 MG/DL
HDLC SERPL-MCNC: 95 MG/DL
HDLC SERPL: 42.4 %
LDLC SERPL CALC-MCNC: 110.2 MG/DL
NONHDLC SERPL-MCNC: 129 MG/DL
POTASSIUM SERPL-SCNC: 3.7 MMOL/L
PROT SERPL-MCNC: 7.3 G/DL
SODIUM SERPL-SCNC: 138 MMOL/L
TRIGL SERPL-MCNC: 94 MG/DL

## 2018-01-30 PROCEDURE — 80053 COMPREHEN METABOLIC PANEL: CPT

## 2018-01-30 PROCEDURE — 80061 LIPID PANEL: CPT

## 2018-01-30 PROCEDURE — 36415 COLL VENOUS BLD VENIPUNCTURE: CPT | Mod: PO

## 2018-02-10 RX ORDER — BUTALBITAL, ACETAMINOPHEN AND CAFFEINE 50; 325; 40 MG/1; MG/1; MG/1
1 TABLET ORAL 2 TIMES DAILY PRN
Qty: 30 TABLET | Refills: 0 | Status: SHIPPED | OUTPATIENT
Start: 2018-02-10 | End: 2018-03-08

## 2018-02-19 ENCOUNTER — CLINICAL SUPPORT (OUTPATIENT)
Dept: CARDIOLOGY | Facility: CLINIC | Age: 73
End: 2018-02-19
Attending: INTERNAL MEDICINE
Payer: MEDICARE

## 2018-02-19 DIAGNOSIS — I49.5 SSS (SICK SINUS SYNDROME): ICD-10-CM

## 2018-02-19 DIAGNOSIS — Z95.0 CARDIAC PACEMAKER IN SITU: ICD-10-CM

## 2018-02-19 PROCEDURE — 93296 REM INTERROG EVL PM/IDS: CPT | Mod: S$GLB,,, | Performed by: INTERNAL MEDICINE

## 2018-02-19 PROCEDURE — 93294 REM INTERROG EVL PM/LDLS PM: CPT | Mod: S$GLB,,, | Performed by: INTERNAL MEDICINE

## 2018-02-22 RX ORDER — PROMETHAZINE HYDROCHLORIDE 25 MG/1
SUPPOSITORY RECTAL
Qty: 12 SUPPOSITORY | Refills: 0 | Status: SHIPPED | OUTPATIENT
Start: 2018-02-22 | End: 2018-03-08 | Stop reason: SDUPTHER

## 2018-02-26 ENCOUNTER — LAB VISIT (OUTPATIENT)
Dept: LAB | Facility: HOSPITAL | Age: 73
End: 2018-02-26
Attending: INTERNAL MEDICINE
Payer: MEDICARE

## 2018-02-26 DIAGNOSIS — D50.0 IRON DEFICIENCY ANEMIA DUE TO CHRONIC BLOOD LOSS: ICD-10-CM

## 2018-02-26 DIAGNOSIS — F31.0 BIPOLAR AFFECTIVE DISORDER, CURRENT EPISODE HYPOMANIC: ICD-10-CM

## 2018-02-26 LAB
ALBUMIN SERPL BCP-MCNC: 4.7 G/DL
ALP SERPL-CCNC: 95 U/L
ALT SERPL W/O P-5'-P-CCNC: 46 U/L
ANION GAP SERPL CALC-SCNC: 14 MMOL/L
AST SERPL-CCNC: 46 U/L
BASOPHILS # BLD AUTO: 0.05 K/UL
BASOPHILS NFR BLD: 0.8 %
BILIRUB SERPL-MCNC: 0.4 MG/DL
BUN SERPL-MCNC: 25 MG/DL
CALCIUM SERPL-MCNC: 8.6 MG/DL
CHLORIDE SERPL-SCNC: 90 MMOL/L
CO2 SERPL-SCNC: 36 MMOL/L
CREAT SERPL-MCNC: 1.12 MG/DL
DIFFERENTIAL METHOD: ABNORMAL
EOSINOPHIL # BLD AUTO: 0.1 K/UL
EOSINOPHIL NFR BLD: 1.9 %
ERYTHROCYTE [DISTWIDTH] IN BLOOD BY AUTOMATED COUNT: 11.5 %
EST. GFR  (AFRICAN AMERICAN): 56 ML/MIN/1.73 M^2
EST. GFR  (NON AFRICAN AMERICAN): 49 ML/MIN/1.73 M^2
FERRITIN SERPL-MCNC: 72 NG/ML
GLUCOSE SERPL-MCNC: 91 MG/DL
HCT VFR BLD AUTO: 35.6 %
HGB BLD-MCNC: 11.6 G/DL
LYMPHOCYTES # BLD AUTO: 1.4 K/UL
LYMPHOCYTES NFR BLD: 23.3 %
MCH RBC QN AUTO: 30.4 PG
MCHC RBC AUTO-ENTMCNC: 32.6 G/DL
MCV RBC AUTO: 93 FL
MONOCYTES # BLD AUTO: 0.7 K/UL
MONOCYTES NFR BLD: 12.5 %
NEUTROPHILS # BLD AUTO: 3.6 K/UL
NEUTROPHILS NFR BLD: 61.5 %
NRBC BLD-RTO: 0 /100 WBC
PLATELET # BLD AUTO: 221 K/UL
PMV BLD AUTO: 10.9 FL
POTASSIUM SERPL-SCNC: 3.2 MMOL/L
PROT SERPL-MCNC: 7.8 G/DL
RBC # BLD AUTO: 3.81 M/UL
SODIUM SERPL-SCNC: 140 MMOL/L
WBC # BLD AUTO: 5.93 K/UL

## 2018-02-26 PROCEDURE — 82728 ASSAY OF FERRITIN: CPT | Mod: PN

## 2018-02-26 PROCEDURE — 83540 ASSAY OF IRON: CPT | Mod: PN

## 2018-02-26 PROCEDURE — 80053 COMPREHEN METABOLIC PANEL: CPT | Mod: PN

## 2018-02-26 PROCEDURE — 85025 COMPLETE CBC W/AUTO DIFF WBC: CPT

## 2018-02-26 PROCEDURE — 82728 ASSAY OF FERRITIN: CPT

## 2018-02-26 PROCEDURE — 36415 COLL VENOUS BLD VENIPUNCTURE: CPT | Mod: PN

## 2018-02-26 PROCEDURE — 85025 COMPLETE CBC W/AUTO DIFF WBC: CPT | Mod: PN

## 2018-02-26 PROCEDURE — 83540 ASSAY OF IRON: CPT

## 2018-02-26 PROCEDURE — 84466 ASSAY OF TRANSFERRIN: CPT

## 2018-02-26 PROCEDURE — 84238 ASSAY NONENDOCRINE RECEPTOR: CPT

## 2018-02-26 PROCEDURE — 80053 COMPREHEN METABOLIC PANEL: CPT

## 2018-02-27 LAB
IRON SERPL-MCNC: 79 UG/DL
SATURATED IRON: 20 %
TOTAL IRON BINDING CAPACITY: 394 UG/DL
TRANSFERRIN SERPL-MCNC: 266 MG/DL

## 2018-02-28 LAB — STFR SERPL-MCNC: 2.4 MG/L

## 2018-03-01 ENCOUNTER — OFFICE VISIT (OUTPATIENT)
Dept: HEMATOLOGY/ONCOLOGY | Facility: CLINIC | Age: 73
End: 2018-03-01
Payer: MEDICARE

## 2018-03-01 VITALS
HEIGHT: 64 IN | DIASTOLIC BLOOD PRESSURE: 73 MMHG | OXYGEN SATURATION: 92 % | TEMPERATURE: 98 F | RESPIRATION RATE: 20 BRPM | HEART RATE: 69 BPM | SYSTOLIC BLOOD PRESSURE: 180 MMHG | WEIGHT: 120.56 LBS | BODY MASS INDEX: 20.58 KG/M2

## 2018-03-01 DIAGNOSIS — N17.9 ACUTE KIDNEY INJURY: Primary | ICD-10-CM

## 2018-03-01 DIAGNOSIS — E53.8 B12 DEFICIENCY: ICD-10-CM

## 2018-03-01 DIAGNOSIS — D50.0 IRON DEFICIENCY ANEMIA DUE TO CHRONIC BLOOD LOSS: ICD-10-CM

## 2018-03-01 DIAGNOSIS — I27.20 PULMONARY HYPERTENSION: ICD-10-CM

## 2018-03-01 DIAGNOSIS — I10 ESSENTIAL HYPERTENSION: ICD-10-CM

## 2018-03-01 DIAGNOSIS — I25.10 CORONARY ARTERY DISEASE INVOLVING NATIVE CORONARY ARTERY OF NATIVE HEART WITHOUT ANGINA PECTORIS: ICD-10-CM

## 2018-03-01 PROCEDURE — 99214 OFFICE O/P EST MOD 30 MIN: CPT | Mod: S$GLB,,, | Performed by: INTERNAL MEDICINE

## 2018-03-01 PROCEDURE — 3078F DIAST BP <80 MM HG: CPT | Mod: S$GLB,,, | Performed by: INTERNAL MEDICINE

## 2018-03-01 PROCEDURE — 3077F SYST BP >= 140 MM HG: CPT | Mod: S$GLB,,, | Performed by: INTERNAL MEDICINE

## 2018-03-01 PROCEDURE — 99999 PR PBB SHADOW E&M-EST. PATIENT-LVL III: CPT | Mod: PBBFAC,,, | Performed by: INTERNAL MEDICINE

## 2018-03-01 PROCEDURE — 99499 UNLISTED E&M SERVICE: CPT | Mod: S$GLB,,, | Performed by: INTERNAL MEDICINE

## 2018-03-01 NOTE — PROGRESS NOTES
Subjective:       Patient ID: Dulce Castro is a 72 y.o. female.    Chief Complain  HPI:   A 70-year-old  woman, very well known to me. The patient has pulmonary  hypertension, coronary artery disease, anemia, neutropenia and had a history of  gastric bypass and bipolar disorder. She has been following clinically. She   states her pulmonary hypertension has stabilized. She follows with   pulmonologist, and she follows with Cardiology as well.lately the tegretol has been lowered. Not drinking, no other drugs. Feeling better now since dc from hospital 12/23, occult blood was positive, pt elected for op scopes, feeling tired on and off    chronic headaches , seeing headache specialist for you  REVIEW OF SYSTEMS:   Has pacemaker  No CP occ palpitations, feels SOB on minial activity from pulmonary HTN, c/o of headaches, has an appt with a headache specialist   denies abd pain, nausea, vomiting, cough or sputum production   occ feet swelling  Not had a capsule study  PHYSICAL EXAM:     Wt Readings from Last 3 Encounters:   03/01/18 54.7 kg (120 lb 9.5 oz)   01/23/18 56.1 kg (123 lb 10.9 oz)   12/19/17 54.4 kg (119 lb 14.9 oz)     Temp Readings from Last 3 Encounters:   03/01/18 98.1 °F (36.7 °C) (Oral)   12/19/17 98.6 °F (37 °C) (Oral)   12/02/17 98.5 °F (36.9 °C) (Oral)     BP Readings from Last 3 Encounters:   03/01/18 (!) 180/73   01/23/18 (!) 99/58   12/19/17 120/60     Pulse Readings from Last 3 Encounters:   03/01/18 69   01/23/18 78   12/19/17 65     GENERAL: Comfortable looking patient. Patient is in no distress.  Awake, alert and oriented to time, person and place.  No anxiety, or agitation.      HEENT: Normal conjunctivae and eyelids. WNL.  PERRLA 3 to 4 mm. No icterus, no pallor, no congestion, and no discharge noted.     NECK:  Supple. Trachea is central.  No crepitus.  No JVD or masses.    RESPIRATORY:  No intercostal retractions.  No dullness to percussion.  Chest is clear to  auscultation.  No rales, rhonchi or wheezes.  No crepitus.  Good air entry bilaterally.    CARDIOVASCULAR:  S1 and S2 are normally heard without murmurs or gallops.  All peripheral pulses are present.    ABDOMEN:  Normal abdomen.  No hepatosplenomegaly.  No free fluid.  Bowel sounds are present.  No hernia noted. No masses.  No rebound or tenderness.  No guarding or rigidity.  Umbilicus is midline.    LYMPHATICS:  No axillary, cervical, supraclavicular, submental, or inguinal lymphadenopathy.    SKIN/MUSCULOSKELETAL:  There is no evidence of excoriation marks or ecchmosis.  No rashes.  No cyanosis.  No clubbing.  No joint or skeletal deformities noted.  Normal range of motion.    NEUROLOGIC:  Higher functions are appropriate.  No cranial nerve deficits.  Normal sebastián.  Normal strength.  Motor and sensory functions are normal.  Deep tendon reflexes are normal.    GENITAL/RECTAL:  Exams are deferred.      Laboratory:     CBC:  Lab Results   Component Value Date    WBC 5.93 02/26/2018    RBC 3.81 (L) 02/26/2018    HGB 11.6 (L) 02/26/2018    HCT 35.6 (L) 02/26/2018    MCV 93 02/26/2018    MCH 30.4 02/26/2018    MCHC 32.6 02/26/2018    RDW 11.5 02/26/2018     02/26/2018    MPV 10.9 02/26/2018    GRAN 3.6 02/26/2018    GRAN 61.5 02/26/2018    LYMPH 1.4 02/26/2018    LYMPH 23.3 02/26/2018    MONO 0.7 02/26/2018    MONO 12.5 02/26/2018    EOS 0.1 02/26/2018    BASO 0.05 02/26/2018    EOSINOPHIL 1.9 02/26/2018    BASOPHIL 0.8 02/26/2018       BMP: BMP  Lab Results   Component Value Date     02/26/2018    K 3.2 (L) 02/26/2018    CL 90 (L) 02/26/2018    CO2 36 (H) 02/26/2018    BUN 25 (H) 02/26/2018    CREATININE 1.12 02/26/2018    CALCIUM 8.6 02/26/2018    ANIONGAP 14 02/26/2018    ESTGFRAFRICA 56 (A) 02/26/2018    EGFRNONAA 49 (A) 02/26/2018       LFT:   Lab Results   Component Value Date    ALT 46 (H) 02/26/2018    AST 46 (H) 02/26/2018    ALKPHOS 95 02/26/2018    BILITOT 0.4 02/26/2018     Lab Results    Component Value Date    KWALARCG97 >1000 (H) 10/20/2017     Haptoglobin was low  Assessment/Plan:   hgb has stabilized, may have had a component of hemolysis.   Vish neg haptoglobin has normalsied .   Cbc, cmp   hbg stable   rtc 4 months with cbc, cmp,    bipolar dz: well controlled, chronic hradaches? Hypoxic, vascular, seeing headache specialist  Tegretol has been given by Dr Ness Haji for bipolar dz.  Cont f/u kasey Nelson headache speciaity

## 2018-03-08 ENCOUNTER — OFFICE VISIT (OUTPATIENT)
Dept: FAMILY MEDICINE | Facility: CLINIC | Age: 73
End: 2018-03-08
Payer: MEDICARE

## 2018-03-08 ENCOUNTER — OFFICE VISIT (OUTPATIENT)
Dept: NEUROLOGY | Facility: CLINIC | Age: 73
End: 2018-03-08
Payer: MEDICARE

## 2018-03-08 VITALS
WEIGHT: 118.63 LBS | OXYGEN SATURATION: 97 % | SYSTOLIC BLOOD PRESSURE: 132 MMHG | HEART RATE: 45 BPM | BODY MASS INDEX: 20.25 KG/M2 | HEIGHT: 64 IN | RESPIRATION RATE: 18 BRPM | DIASTOLIC BLOOD PRESSURE: 64 MMHG

## 2018-03-08 VITALS
RESPIRATION RATE: 17 BRPM | SYSTOLIC BLOOD PRESSURE: 132 MMHG | BODY MASS INDEX: 19.23 KG/M2 | HEIGHT: 64 IN | DIASTOLIC BLOOD PRESSURE: 76 MMHG | WEIGHT: 112.63 LBS | HEART RATE: 67 BPM

## 2018-03-08 DIAGNOSIS — G89.29 CHRONIC INTRACTABLE HEADACHE, UNSPECIFIED HEADACHE TYPE: ICD-10-CM

## 2018-03-08 DIAGNOSIS — I27.20 PULMONARY HTN: ICD-10-CM

## 2018-03-08 DIAGNOSIS — R51.9 CHRONIC INTRACTABLE HEADACHE, UNSPECIFIED HEADACHE TYPE: ICD-10-CM

## 2018-03-08 DIAGNOSIS — G43.719 INTRACTABLE CHRONIC MIGRAINE WITHOUT AURA AND WITHOUT STATUS MIGRAINOSUS: Primary | ICD-10-CM

## 2018-03-08 DIAGNOSIS — E87.6 HYPOKALEMIA: ICD-10-CM

## 2018-03-08 DIAGNOSIS — I10 ESSENTIAL HYPERTENSION: Primary | ICD-10-CM

## 2018-03-08 DIAGNOSIS — M54.2 NECK PAIN: ICD-10-CM

## 2018-03-08 PROCEDURE — 99499 UNLISTED E&M SERVICE: CPT | Mod: S$GLB,,, | Performed by: PSYCHIATRY & NEUROLOGY

## 2018-03-08 PROCEDURE — 3078F DIAST BP <80 MM HG: CPT | Mod: S$GLB,,, | Performed by: PSYCHIATRY & NEUROLOGY

## 2018-03-08 PROCEDURE — 99499 UNLISTED E&M SERVICE: CPT | Mod: S$GLB,,, | Performed by: FAMILY MEDICINE

## 2018-03-08 PROCEDURE — 99214 OFFICE O/P EST MOD 30 MIN: CPT | Mod: S$GLB,,, | Performed by: FAMILY MEDICINE

## 2018-03-08 PROCEDURE — 99999 PR PBB SHADOW E&M-EST. PATIENT-LVL III: CPT | Mod: PBBFAC,,, | Performed by: FAMILY MEDICINE

## 2018-03-08 PROCEDURE — 3075F SYST BP GE 130 - 139MM HG: CPT | Mod: S$GLB,,, | Performed by: FAMILY MEDICINE

## 2018-03-08 PROCEDURE — 3075F SYST BP GE 130 - 139MM HG: CPT | Mod: S$GLB,,, | Performed by: PSYCHIATRY & NEUROLOGY

## 2018-03-08 PROCEDURE — 99999 PR PBB SHADOW E&M-EST. PATIENT-LVL III: CPT | Mod: PBBFAC,,, | Performed by: PSYCHIATRY & NEUROLOGY

## 2018-03-08 PROCEDURE — 64615 CHEMODENERV MUSC MIGRAINE: CPT | Mod: S$GLB,,, | Performed by: PSYCHIATRY & NEUROLOGY

## 2018-03-08 PROCEDURE — 3078F DIAST BP <80 MM HG: CPT | Mod: S$GLB,,, | Performed by: FAMILY MEDICINE

## 2018-03-08 RX ORDER — POTASSIUM CHLORIDE 20 MEQ/1
TABLET, EXTENDED RELEASE ORAL
Qty: 40 TABLET | Refills: 5 | Status: ON HOLD | OUTPATIENT
Start: 2018-03-08 | End: 2019-04-19 | Stop reason: HOSPADM

## 2018-03-08 RX ORDER — TIZANIDINE 4 MG/1
4 TABLET ORAL EVERY 8 HOURS PRN
Qty: 60 TABLET | Refills: 5 | Status: SHIPPED | OUTPATIENT
Start: 2018-03-08 | End: 2018-08-17 | Stop reason: SDUPTHER

## 2018-03-08 RX ORDER — ONDANSETRON 8 MG/1
8 TABLET, ORALLY DISINTEGRATING ORAL 3 TIMES DAILY PRN
Qty: 60 TABLET | Refills: 5 | Status: SHIPPED | OUTPATIENT
Start: 2018-03-08 | End: 2019-03-01

## 2018-03-08 NOTE — PROGRESS NOTES
Subjective:       Patient ID: Dulce Castro is a 72 y.o. female.    Chief Complaint: Headache  INTERVAL HISTORY  The patient presents for follow up, she is having 15 or more headaches per month with frequent escalations. She has tried Fioricet without benefit. She is limited in terms of options due to her underlying multiple cardiac co morbidities. In addition, she is nauseous very frequently and the leads to headaches as well. There are no red flags to investigate.  HPI   The patient is a pleasant 73 y/o female presenting with chief complain of headache. They started last August 2016, they are now daily and quite disabling. They are located above the eyes. While she suffers from dry eyes, the ophthalmologist told her that this is not the cause of her headaches. Likewise, the ENT has excluded sinus pathology responsible for the headaches. She has had 3 CT of the head, two in August 2016 and one in December 2016, all of which are negative. She is anemic and low in iron requiring infusions from time to time. Her medical history is quite complicated because of cardiopulmonary co morbidity. She suffers with Pulmonary Hypertension, she is oxygen dependent and is status post mitral valve prolapse and repair. In addition she has an implanted insertional dual pacemaker. She has been on Tegretol, Desyrel and Xanax for a long time for Bipolar Disorder. In addition, the patient was in a coma for 3 days, unknown origin. She is status post bariatric surgery going from 220 lbs to 120 lbs.  Please see details of headache characteristics headache below.    Headache questionnaire     1. When did your Headaches start?       August 2016      2. Where are your headaches located?                        Around and above the eyes        3. Your headache's characteristics:                        Pressure, Throbbing, Pounding     4. How long does the headache last?                        Days        5. How often does the headache  occur?                        daily        6. Are your headaches preceded or accompanied by other symptoms? no                        If yes, please describe.  N/A        7. Does the headache awaken you at night? no                        If so, how often? N/A           8. Please jeremias the word that best describes your headache's intensity:                         severe        9. Using a scale of 1 through 10, with 0 = no pain and 10 = the worst pain:                        What score is your headache now? 8                        What score is your headache at its worst? 10                        What score is your headache at its best? 5           10. Possible associated headache symptoms:  [x]  Sensitivity to light                                      [x] Dizziness                                        [] Nasal or sinus pressure/ pain                        [x] Sensitivity to noise                                     [] Vertigo                                            [] Problems with concentration  [] Sensitivity to smells             [x] Ringing in ears                     [] Problems with memory                                            [x] Blurred vision                                 [] Irritability                                         [] Problems with task completion   [] Double vision                                 [] Anger                                  [x]  Problems with relaxation  [] Loss of appetite                                         [x] Anxiety                                           [x] Neck tightness, Neck pain  [x] Nausea                                                                 [] Nasal congestion  [] Vomiting                                                                       11. Headache improving factors:  [x] Sleep                                  [] Heat  [x] Darkness                                        [] Ice  [] Local pressure                     [] Menses  (period)  [] Massage                                        [] Medications:          12. Headache worsening factors:   [x] Fatigue                     [] Sneezing                                        [] Changes in Weather  [x] Light             [] Bending Over           [x] Stress  [x] Noise            [] Ovulation                                        [] Multiple Sclerosis Flare-Up  [] Smells                      [] Menses                                          [] Food   [] Coughing                  [] Alcohol        13. Number of caffeinated drinks per day: 1        14. Number of diet drinks per day:  4        15. Have you seen any other [facility]ists within the last 3 years?  no         Please Check any Medications Tried for Headache     AED Neuromodulators   MAOIs   Ergot Alkaloids     Acetazolamide (Diamox) [] Phenelzine (Nardil) [] Dihydroergotamine (Migranal) []   Carbamazepine (Tegretol) [x] Tranylcypromine (Parnate) [] Ergotamine (Ergomar) []   Gabapentin (Neurontin) [x] Antihistamine/Serotonergic   Triptans     Lacosamide (Vimpat) [] Cyproheptadine (Periactin) [] Almotriptan (Axert) []   Lamotrigine (Lamictal) [] Antihypertensives   Eletriptan (Relpax) []   Levatiracetam (Keppra) [] Atenolol (Tenormin) [] Frovatriptan (Frova) []   Oxcarbazepine (Trileptal) [] Bisoprostol (Zebeta) [] Naratriptan (Amerge) []   Phenobarbital [] Candesartan (Atacand) [] Rizatriptan (Maxalt) []   Phenytoin (Dilantin) [] Diltiazem (Cardizem) [] Sumatriptan (Imitrex) []   Pregabalin (Lyrica) [] Lisinopril (Prinivil, Zestril) [] Zolmitriptan (Zomig) []   Primidone (Mysoline) [] Metoprolol (Toprol) [] Combo Abortives     Tiagabine (Gabatril) [] Nadolol (Corgard) [] Butalbital and Acetaminophen (Bupap) []   Topiramate (Topamax)  (Trokendi) [] Nicardipine (Cardene) []     Vigabatrin (Sabril) [] Nimodipine (Nimotop) [] Butalbital, Acetaminophen, and caffeine (Fioricet) []   Valproic Acid (Depakote) (Divalproex Sodium) []  Propranolol (Inderal) []     Zonisamide (Zonegran) [] Telmisartan (Micardis) [] Butalbital, Aspirin, and caffeine (Fiorinal) []   Benzodiazepines   Timolol (Blocadren) []     Alprazolam (Xanax) [] Verapamil (Calan, Verelan) [] Butalbital, Caffeine, Acetaminophen, and Codeine (Fioricet with Codeine) []   Diazepam (Valium) [] NSAIDs       Lorazepam (Ativan) [] Acetaminophen (Tylenol) [x]     Clonazepam (Klonopin) [] Acetylsalicylic Acid (Aspirin) [] Butalbital, Caffeine, Aspirin, and Codeine  (Fiorinal with Codeine) []   Antidepressants   Diclofenac (Cambia) []     Amitriptyline (Elavil) [] Ibuprofen (Motrin) [x]     Desipramine (Norpramin) [] Indomethacin (Indocin) [] Aspirin, Caffeine, and Acetaminophen (Excedrin) (Goodys) [x]   Doxepin (Sinequan) [] Ketoprofen (Orudis) []     Fluoxetine (Prozac) [] Ketorolac (Toradol) [] Acetaminophen, Dichloralphenazone, and Isometheptene (Midrin) []   Imipramine (Tofranil) [] Naproxen (Anaprox) (Aleve) [x]     Nortriptyline (Pamelor) [] Meclofenamic Acid (Meclomen) []     Venlafaxine (Effexor) [x] Meloxicam (Mobic) [] Aspirin, Salicylamide, and Caffeine (BC Powder) [x]                           Review of Systems   Constitutional: Positive for fatigue. Negative for activity change, appetite change and fever.   HENT: Positive for tinnitus. Negative for congestion, dental problem, hearing loss, sinus pressure, trouble swallowing and voice change.    Eyes: Positive for visual disturbance. Negative for photophobia, pain and redness.   Respiratory: Negative for cough, chest tightness and shortness of breath.    Cardiovascular: Negative for chest pain, palpitations and leg swelling.   Gastrointestinal: Negative for abdominal pain, blood in stool, nausea and vomiting.   Endocrine: Negative for cold intolerance and heat intolerance.   Genitourinary: Negative for difficulty urinating, frequency, menstrual problem and urgency.   Musculoskeletal: Positive for arthralgias, myalgias and neck  pain. Negative for back pain, gait problem, joint swelling and neck stiffness.   Skin: Negative.    Neurological: Positive for weakness and headaches. Negative for dizziness, tremors, seizures, syncope, facial asymmetry, speech difficulty, light-headedness and numbness.   Hematological: Negative for adenopathy. Does not bruise/bleed easily.   Psychiatric/Behavioral: Negative for agitation, behavioral problems, confusion, decreased concentration, self-injury, sleep disturbance and suicidal ideas. The patient is not nervous/anxious and is not hyperactive.          Past Medical History:   Diagnosis Date    Allergy     Anemia     Anticoagulant long-term use     Anxiety     Arthritis     Bipolar disorder     Blood transfusion     Carpal tunnel syndrome     Cataract     CHF (congestive heart failure)     Depression     VEE (dyspnea on exertion)     general activity    GERD (gastroesophageal reflux disease)     Heart murmur     Hypertension     Irritable bladder     Meningitis     Mitral valve prolapse     On home O2     @ 3 liters    TEE (obstructive sleep apnea)     Pulmonary HTN     Thyroid disease      Past Surgical History:   Procedure Laterality Date     bladder stimulator      [employer]    ABDOMINAL SURGERY      APPENDECTOMY      CARDIAC PACEMAKER PLACEMENT  10/2016    CARDIAC SURGERY  2007    MVR    CARPAL TUNNEL RELEASE Right     CHOLECYSTECTOMY  2010    COLONOSCOPY N/A 2/17/2017    Procedure: COLONOSCOPY;  Surgeon: Artie Mills Jr., MD;  Location: [facility];  Service: Endoscopy;  Laterality: N/A;    ESOPHAGOGASTRODUODENOSCOPY  09/14/2011    BEAU.   Normal esophagus.  NERD.  Gastric bypass.  Normal anastomosis.  Normal jejunum.    EYE SURGERY Bilateral     cataracts    FOOT SURGERY Right     2 Hematomas on nerves    GASTRIC BYPASS  2004    HYSTERECTOMY      separate procedures for uterus, ovaries and then an ex-lap for adhesions    open heart surgery      mitral valve  repair     Family History   Problem Relation Age of Onset    Heart disease Mother     Heart disease Father     Heart disease Brother     Collagen disease Neg Hx      Social History     Social History    Marital status:      Spouse name: N/A    Number of children: N/A    Years of education: N/A     Occupational History    Not on file.     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     Review of patient's allergies indicates:   Allergen Reactions    Morphine Other (See Comments)     Difficulty swallowing    Tramadol Other (See Comments)     itching       Current Outpatient Prescriptions:     alprazolam (XANAX) 1 MG tablet, Take 1 mg by mouth 4 (four) times daily as needed., Disp: , Rfl:     carbamazepine (TEGRETOL) 200 mg tablet, Take 200 mg by mouth 2 (two) times daily., Disp: , Rfl:     CYANOCOBALAMIN/COBAMAMIDE (B12 SL), Place under the tongue., Disp: , Rfl:     diphenhydrAMINE (BENADRYL) 25 mg capsule, Take 1 each (25 mg total) by mouth daily as needed for Itching., Disp: , Rfl: 0    furosemide (LASIX) 40 MG tablet, Take 1 tablet (40 mg total) by mouth Every 3 (three) days. Hold if sbp < 100, Disp: , Rfl:     levothyroxine (SYNTHROID) 112 MCG tablet, Take 1 tablet (112 mcg total) by mouth once daily., Disp: 30 tablet, Rfl: 11    metOLazone (ZAROXOLYN) 5 MG tablet, Take 0.5 tablets (2.5 mg total) by mouth once a week. Hold if sbp < 100, Disp: , Rfl:     OXYGEN-AIR DELIVERY SYSTEMS MISC, 3 L by Nasal route Daily. uses hs and prn for SOB, Disp: , Rfl:     potassium chloride SA (K-DUR,KLOR-CON)  20 MEQ tablet, Take 1 tablet (20 mEq total) by mouth once daily. X 3 days (Patient taking differently: Take 20 mEq by mouth once daily. X 3 days  Takes with lasix), Disp: 3 tablet, Rfl: 0    promethazine (PHENERGAN) 25 MG suppository, Place 1 suppository (25 mg total) rectally every 6 (six) hours as needed for Nausea., Disp: 30 suppository, Rfl: 2    trazodone (DESYREL) 300 MG tablet, Take 0.5 tablets (150 mg total) by mouth nightly as needed for Insomnia. (Patient taking differently: Take 300 mg by mouth nightly. ), Disp: 30 tablet, Rfl: 0    venlafaxine (EFFEXOR-XR) 75 MG 24 hr capsule, 75 mg. Three tablets daily 225 mg, Disp: , Rfl:       Objective:      Vitals:    03/08/18 1435   BP: 132/76   Pulse: 67   Resp: 17     Body mass index is 19.34 kg/m².      Physical Exam    Constitutional:   She appears well-developed, looks frail. She is well groomed    HENT:    Head: Atraumatic, oral and nasal mucosa intact  Eyes: Conjunctivae slightly hyperemic, EOM are normal. Pupils are equal, round, and reactive to light OU  Neck: Neck supple. No thyromegaly present  Cardiovascular: Normal rate and normal heart sounds  No murmur heard  Pulmonary/Chest: Effort normal and breath sounds normal  Skin: Skin is warm and dry  Psychiatric: Normal mood and affect     Neuro exam:    Mental status:  Awake, attentive, Alert, oriented to self, place, year and month  Language function is intactt      Cranial Nerves:  Smell was not formally evaluated  Cranial Nerves II - XII: intact  Pursuits were smooth, normal saccades, no nystagmus OU  Funduscopic exam - disc were flat and pink, no exudates or hemorrhages OU  Motor - facial movement was symmetrical and normal     Palate moved well and was symmetrical with normal palatal and oral sensation  Tongue movements were full    Coordination:     Rapid alternating movements and rapid finger tapping - normal bilaterally  Finger to nose - normal and symmetric bilaterally   Arm roll - smooth and  symmetric   No intentional or positional tremor.     Motor:  Normal muscle bulk and symmetry. No fasciculations were noted    No pronator drift  Strength 5/5 bilaterally     Reflexes:  Tendon reflexes were 2 + at biceps, triceps, brachioradialis, patellar, and Achilles bilaterally  On plantar stimulation toes were down going bilaterally  No clonus was noted     Sensory: Intact to light touch, pin prick in all extremities.     Gait: Romberg absent. Slow pace, mild stooped posture. Normal arm swing and turns.     Review of Data: Head CT x 3 all negative. Iron deficiency anemia. Abnormal ECHO  BOTOX PROCEDURE    PROCEDURE PERFORMED: Botulinum toxin injection (10361)    CLINICAL INDICATION: G43.719    A time out was conducted just before the start of the procedure to verify the correct patient and procedure, procedure location, and all relevant critical information.     Conventional methods of treatment but the patient has been unresponsive and refractory.The patient meets criteria for chronic headaches according to the ICHD-II, the patient has more than 15 headaches a month which last for more than 4 hours a day.    This is the first Botox injections and I am aiming for at least 50%  improvement in the patient's symptoms. Frequency of treatment is every 3 months unless no response to the treatments, at which time we will discontinue the injections.     DESCRIPTION OF PROCEDURE: After obtaining informed consent and under aseptic technique, a total of 155 units of botulinum toxin type A were injected in the following muscles: Procerus 5 units,  5 units   bilaterally, frontalis 20 units, temporalis 20 units bilaterally, occipitalis 15 units, upper cervical paraspinals 10 units bilaterally and trapezius 15 units bilaterally. The patient was given a total of 155 units in 31 sites.The patient tolerated the procedure well. There were no complications. The patient was given a prescription for repeat treatment in 3  months.     Unavoidable waste 45 units        Assessment and Plan   Very complicated case due to underlying co morbidities which influence therapeutic decisions. Overall improved on Gabapentin. Continue Gabapentin to 300 mg TID since there has been no significant added improvement, will proceed with Botox today  Pulmonary Hypertension  Oxygen dependent  Status post Bariatric surgery  Multiple other co morbidities as listed in the problem list    I have discussed the side effects of the medications prescribed and the patient acknowledges understanding        Yousif Nelson M.D  Medical Director, Headache and Facial Pain  [facility]

## 2018-03-08 NOTE — PROGRESS NOTES
Subjective:       Patient ID: Dulce Castro is a 72 y.o. female.    Chief Complaint: Nausea (medication change); Migraine (follow up); Neck Pain (follow up with medication refill); and Hypokalemia (follow up)    HPI     Here for a f/u     Takes lasix with potassium 20 meq every 3 days for pulm htn. Sees Dr. Cline, pulmonologist, for management.     Recent cmp showed low potassium.      bp at home: 130's/50-60's.      Has daily bilat frontal migraines. No aura. Ps: 2-3/10. Sleep helps. Light/noise sensitive. Associated nausea. Sees neurology for management. Planning to see neurologist this afternoon.     Requesting a medication for nausea.      Has chronic neck pain with stiffness. Takes zanaflex 1-2 x per day for relief.  Needs rf of zanaflex.         Review of Systems      Review of Systems   Constitutional: Negative for fever and chills.   HENT: Negative for hearing loss and neck stiffness.    Eyes: Negative for redness and itching.   Respiratory: Negative for cough and choking.    Cardiovascular: Negative for chest pain and leg swelling.  Abdomen: Negative for abdominal pain and blood in stool.   Genitourinary: Negative for dysuria and flank pain.   Musculoskeletal: Negative for gait problem.   Neurological: Negative for light-headedness   Hematological: Negative for adenopathy.   Psychiatric/Behavioral: Negative for behavioral problems.       Objective:      Physical Exam   Constitutional: She appears well-developed.   HENT:   Head: Normocephalic and atraumatic.   Eyes: Conjunctivae are normal. Pupils are equal, round, and reactive to light.   Neck: Normal range of motion.   Cardiovascular: Normal rate and regular rhythm.    No murmur heard.  Pulmonary/Chest: Effort normal and breath sounds normal. She has no wheezes.   Lymphadenopathy:     She has no cervical adenopathy.       Assessment:       1. Essential hypertension    2. Pulmonary HTN    3. Neck pain    4. Hypokalemia    5. Chronic intractable  headache, unspecified headache type        Plan:       Essential hypertension    Pulmonary HTN    Neck pain    Hypokalemia    Chronic intractable headache, unspecified headache type    Other orders  -     ondansetron (ZOFRAN-ODT) 8 MG TbDL; Take 1 tablet (8 mg total) by mouth 3 (three) times daily as needed.  Dispense: 60 tablet; Refill: 5  -     tiZANidine (ZANAFLEX) 4 MG tablet; Take 1 tablet (4 mg total) by mouth every 8 (eight) hours as needed.  Dispense: 60 tablet; Refill: 5  -     potassium chloride SA (K-DUR,KLOR-CON) 20 MEQ tablet; Take 2 tablet every third day with furosemide and 1 tablet once a day every other day.  Dispense: 40 tablet; Refill: 5              Plan:  Take 2 kdur 20 meq with lasix 40 mg every 3 days and kdur 20 daily.    rf zanaflex  Start zofran for nausea  Cont all other meds    Medication List with Changes/Refills   New Medications    ONDANSETRON (ZOFRAN-ODT) 8 MG TBDL    Take 1 tablet (8 mg total) by mouth 3 (three) times daily as needed.   Current Medications    ALPRAZOLAM (XANAX) 1 MG TABLET    Take 1 mg by mouth 4 (four) times daily as needed.    CARBAMAZEPINE (TEGRETOL) 200 MG TABLET    Take 200 mg by mouth 2 (two) times daily.    CYANOCOBALAMIN/COBAMAMIDE (B12 SL)    Place under the tongue.    DIPHENHYDRAMINE (BENADRYL) 25 MG CAPSULE    Take 1 each (25 mg total) by mouth daily as needed for Itching.    FUROSEMIDE (LASIX) 40 MG TABLET    Take 1 tablet (40 mg total) by mouth Every 3 (three) days. Hold if sbp < 100    GABAPENTIN (NEURONTIN) 300 MG CAPSULE    Take 1 capsule (300 mg total) by mouth 3 (three) times daily.    LEVOTHYROXINE (SYNTHROID) 112 MCG TABLET    Take 1 tablet (112 mcg total) by mouth once daily.    OXYGEN-AIR DELIVERY SYSTEMS MISC    3 L by Nasal route Daily. uses hs and prn for SOB    PROMETHAZINE (PHENERGAN) 25 MG SUPPOSITORY    Place 1 suppository (25 mg total) rectally every 6 (six) hours as needed for Nausea.    RESTASIS MULTIDOSE 0.05 % DROP    Place 1 drop  into both eyes 2 (two) times daily.     TRAZODONE (DESYREL) 300 MG TABLET    Take 0.5 tablets (150 mg total) by mouth nightly as needed for Insomnia.    VENLAFAXINE (EFFEXOR-XR) 75 MG 24 HR CAPSULE    Take 225 mg by mouth once daily.    Changed and/or Refilled Medications    Modified Medication Previous Medication    POTASSIUM CHLORIDE SA (K-DUR,KLOR-CON) 20 MEQ TABLET potassium chloride SA (K-DUR,KLOR-CON) 20 MEQ tablet       Take 2 tablet every third day with furosemide and 1 tablet once a day every other day.    Take 2 tablets 1-2 times per day depending on potassium level    TIZANIDINE (ZANAFLEX) 4 MG TABLET tiZANidine (ZANAFLEX) 4 MG tablet       Take 1 tablet (4 mg total) by mouth every 8 (eight) hours as needed.    Take 1 tablet (4 mg total) by mouth every 8 (eight) hours as needed.   Discontinued Medications    BUTALBITAL-ACETAMINOPHEN-CAFFEINE -40 MG (FIORICET, ESGIC) -40 MG PER TABLET    Take 1 tablet by mouth 2 (two) times daily as needed for Pain.    PROMETHEGAN 25 MG SUPPOSITORY    INSERT 1 SUPPOSITORY RECTALLY EVERY 6 HOURS AS NEEDED FOR NAUSEA

## 2018-03-09 DIAGNOSIS — G43.719 INTRACTABLE CHRONIC MIGRAINE WITHOUT AURA AND WITHOUT STATUS MIGRAINOSUS: Primary | ICD-10-CM

## 2018-03-09 NOTE — TELEPHONE ENCOUNTER
----- Message from Alex Freeman sent at 3/9/2018  9:41 AM CST -----  Contact: Ash with [facility]  Ash with [facility], [phone], calling to get clarification on directions for potassium chloride SA (K-DUR,KLOR-CON) 20 MEQ tablet. Also needs refill for furosemide (LASIX) 40 MG tablet. Needs a prescription for zaroxolyn as well. Please advise. Thanks.    [facility]   [facility] [facility] [address]  Phone: [phone] Fax: [phone]

## 2018-03-09 NOTE — TELEPHONE ENCOUNTER
Ptrequeting refill for Lasix. Med pended. Khloe from [facility] needs clarification on mg for Potassium rx.  Please advise.

## 2018-03-09 NOTE — TELEPHONE ENCOUNTER
----- Message from Renuka Arcos sent at 3/8/2018  4:41 PM CST -----  Contact: patient  Patient calling to speak with the Nurse about medication that was supposed to be sent in for nausea. Please advise. Call to pod. No answer.  Call back   Thanks!

## 2018-03-09 NOTE — TELEPHONE ENCOUNTER
----- Message from Sugar Urrutia sent at 3/8/2018  4:43 PM CST -----  Contact: Khloe from [facility]   Khloe from [facility] called stating patient was there to  prescriptions Nothing there  She also needs a mg on the Potassium   Please call  to advise.      Thanks

## 2018-03-10 RX ORDER — FUROSEMIDE 40 MG/1
40 TABLET ORAL
Qty: 10 TABLET | Refills: 5 | Status: ON HOLD | OUTPATIENT
Start: 2018-03-10 | End: 2019-04-29 | Stop reason: HOSPADM

## 2018-03-12 ENCOUNTER — TELEPHONE (OUTPATIENT)
Dept: NEUROLOGY | Facility: CLINIC | Age: 73
End: 2018-03-12

## 2018-03-12 NOTE — TELEPHONE ENCOUNTER
Spoke with Ash at pharmacy.  They had not received furosemide order that was sent 3/10/2018. Gave verbatim verbal order.  That helped to clarify potassium (2 tablets with furosemide, 1 tablet every other day)

## 2018-03-12 NOTE — TELEPHONE ENCOUNTER
Called pharmacy in regards to medication Midrin not being received from our office. Medication successfully sent over to pharmacy. Pharmacist verified receipt.

## 2018-03-12 NOTE — TELEPHONE ENCOUNTER
----- Message from Aidan Palmer sent at 3/12/2018  1:09 PM CDT -----  Contact: [facility]  Ash called in regarding the attached patient and stated patient told him that office was supposed to phone in a Rx for her.  Ash is not sure what it is supposed to be as patient stated she did not know.        [facility] [place] [address] [facility] [address]  Phone: [phone] Fax: [phone]

## 2018-03-12 NOTE — TELEPHONE ENCOUNTER
----- Message from Jeff Maldonado sent at 3/12/2018  9:39 AM CDT -----  Contact: [facility],[place]  Please call [facility], need clarification on potassium rx please call back at    [facility] [place] - [address] [facility] [facility] [address]  Phone: [phone] Fax: [phone]

## 2018-05-14 ENCOUNTER — PES CALL (OUTPATIENT)
Dept: ADMINISTRATIVE | Facility: CLINIC | Age: 73
End: 2018-05-14

## 2018-05-22 DIAGNOSIS — I49.5 SSS (SICK SINUS SYNDROME): ICD-10-CM

## 2018-05-22 DIAGNOSIS — Z95.0 CARDIAC PACEMAKER IN SITU: Primary | ICD-10-CM

## 2018-06-01 ENCOUNTER — TELEPHONE (OUTPATIENT)
Dept: NEUROLOGY | Facility: CLINIC | Age: 73
End: 2018-06-01

## 2018-06-01 NOTE — TELEPHONE ENCOUNTER
Called patient and rescheduled appointment kyler to insurance purposes. Patient verbalized understanding.

## 2018-06-07 ENCOUNTER — DOCUMENTATION ONLY (OUTPATIENT)
Dept: FAMILY MEDICINE | Facility: CLINIC | Age: 73
End: 2018-06-07

## 2018-06-07 ENCOUNTER — TELEPHONE (OUTPATIENT)
Dept: FAMILY MEDICINE | Facility: CLINIC | Age: 73
End: 2018-06-07

## 2018-06-07 DIAGNOSIS — E03.4 HYPOTHYROIDISM DUE TO ACQUIRED ATROPHY OF THYROID: ICD-10-CM

## 2018-06-07 RX ORDER — LEVOTHYROXINE SODIUM 112 UG/1
112 TABLET ORAL DAILY
Qty: 30 TABLET | Refills: 0 | Status: SHIPPED | OUTPATIENT
Start: 2018-06-07 | End: 2018-09-28 | Stop reason: SDUPTHER

## 2018-06-07 RX ORDER — PROMETHAZINE HYDROCHLORIDE 25 MG/1
25 SUPPOSITORY RECTAL EVERY 6 HOURS PRN
Qty: 30 SUPPOSITORY | Refills: 0 | Status: SHIPPED | OUTPATIENT
Start: 2018-06-07 | End: 2018-07-17 | Stop reason: SDUPTHER

## 2018-06-07 NOTE — PROGRESS NOTES
Prior auth for promethazine (PHENERGAN) 25 MG has been initiated on 6-7-2018.  Cmm Key: [id number]    Status: APPROVED

## 2018-06-12 ENCOUNTER — PROCEDURE VISIT (OUTPATIENT)
Dept: NEUROLOGY | Facility: CLINIC | Age: 73
End: 2018-06-12
Payer: MEDICARE

## 2018-06-12 VITALS
HEART RATE: 71 BPM | HEIGHT: 64 IN | SYSTOLIC BLOOD PRESSURE: 166 MMHG | RESPIRATION RATE: 16 BRPM | BODY MASS INDEX: 20.21 KG/M2 | WEIGHT: 118.38 LBS | DIASTOLIC BLOOD PRESSURE: 72 MMHG

## 2018-06-12 DIAGNOSIS — G43.719 INTRACTABLE CHRONIC MIGRAINE WITHOUT AURA AND WITHOUT STATUS MIGRAINOSUS: Primary | ICD-10-CM

## 2018-06-12 PROCEDURE — 64615 CHEMODENERV MUSC MIGRAINE: CPT | Mod: S$GLB,,, | Performed by: PSYCHIATRY & NEUROLOGY

## 2018-06-12 RX ORDER — PROCHLORPERAZINE 25 MG
25 SUPPOSITORY, RECTAL RECTAL EVERY 12 HOURS PRN
Qty: 12 SUPPOSITORY | Refills: 3 | Status: SHIPPED | OUTPATIENT
Start: 2018-06-12 | End: 2018-11-26 | Stop reason: SDUPTHER

## 2018-06-12 NOTE — PROCEDURES
Procedures   BOTOX PROCEDURE    PROCEDURE PERFORMED: Botulinum toxin injection (19383)    CLINICAL INDICATION: G43.719  NDC: 7387-8872-20    A time out was conducted just before the start of the procedure to verify the correct patient and procedure, procedure location, and all relevant critical information.       This is the first Botox injections and I am aiming for at least 50%  improvement in the patient's symptoms. Frequency of treatment is every 3 months unless no response to the treatments, at which time we will discontinue the injections.     DESCRIPTION OF PROCEDURE: After obtaining informed consent and under aseptic technique, a total of 155 units of botulinum toxin type A were   injected in the following muscles: Procerus 5 units,  5 units bilaterally, frontalis 20 units, temporalis 20 units bilaterally, occipitalis 15 units, upper cervical paraspinals 10 units bilaterally and trapezius 15 units bilaterally. The patient was given a total of 155 units in 31 sites.The patient tolerated the procedure well. There were no complications. The patient was given a prescription for repeat treatment in 3 months.     Unavoidable waste 45 units          Yousif Nelson M.D  Medical Director, Headache and Facial Pain  [facility]

## 2018-06-14 ENCOUNTER — LAB VISIT (OUTPATIENT)
Dept: LAB | Facility: HOSPITAL | Age: 73
End: 2018-06-14
Payer: MEDICARE

## 2018-06-14 DIAGNOSIS — F06.34 ORGANIC MOOD DISORDER OF MIXED TYPE: Primary | ICD-10-CM

## 2018-06-14 LAB
BASOPHILS # BLD AUTO: 0.07 K/UL
BASOPHILS NFR BLD: 0.9 %
CARBAMAZEPINE SERPL-MCNC: 7.3 UG/ML
DIFFERENTIAL METHOD: ABNORMAL
EOSINOPHIL # BLD AUTO: 0.1 K/UL
EOSINOPHIL NFR BLD: 1.6 %
ERYTHROCYTE [DISTWIDTH] IN BLOOD BY AUTOMATED COUNT: 11.8 %
HCT VFR BLD AUTO: 35.4 %
HGB BLD-MCNC: 11.6 G/DL
IMM GRANULOCYTES # BLD AUTO: 0.02 K/UL
IMM GRANULOCYTES NFR BLD AUTO: 0.2 %
LYMPHOCYTES # BLD AUTO: 1.2 K/UL
LYMPHOCYTES NFR BLD: 14.2 %
MCH RBC QN AUTO: 30.7 PG
MCHC RBC AUTO-ENTMCNC: 32.8 G/DL
MCV RBC AUTO: 94 FL
MONOCYTES # BLD AUTO: 0.7 K/UL
MONOCYTES NFR BLD: 8.9 %
NEUTROPHILS # BLD AUTO: 6.1 K/UL
NEUTROPHILS NFR BLD: 74.2 %
NRBC BLD-RTO: 0 /100 WBC
PLATELET # BLD AUTO: 194 K/UL
PMV BLD AUTO: 12.8 FL
RBC # BLD AUTO: 3.78 M/UL
WBC # BLD AUTO: 8.22 K/UL

## 2018-06-14 PROCEDURE — 85025 COMPLETE CBC W/AUTO DIFF WBC: CPT

## 2018-06-14 PROCEDURE — 80156 ASSAY CARBAMAZEPINE TOTAL: CPT

## 2018-06-14 PROCEDURE — 36415 COLL VENOUS BLD VENIPUNCTURE: CPT | Mod: PO

## 2018-07-13 ENCOUNTER — LAB VISIT (OUTPATIENT)
Dept: LAB | Facility: HOSPITAL | Age: 73
End: 2018-07-13
Attending: INTERNAL MEDICINE
Payer: MEDICARE

## 2018-07-13 DIAGNOSIS — E53.8 B12 DEFICIENCY: ICD-10-CM

## 2018-07-13 DIAGNOSIS — I25.10 CORONARY ARTERY DISEASE INVOLVING NATIVE CORONARY ARTERY OF NATIVE HEART WITHOUT ANGINA PECTORIS: ICD-10-CM

## 2018-07-13 DIAGNOSIS — I27.20 PULMONARY HYPERTENSION: ICD-10-CM

## 2018-07-13 DIAGNOSIS — N17.9 ACUTE KIDNEY INJURY: ICD-10-CM

## 2018-07-13 DIAGNOSIS — D50.0 IRON DEFICIENCY ANEMIA DUE TO CHRONIC BLOOD LOSS: ICD-10-CM

## 2018-07-13 DIAGNOSIS — I10 ESSENTIAL HYPERTENSION: ICD-10-CM

## 2018-07-13 LAB
ALBUMIN SERPL BCP-MCNC: 4.2 G/DL
ALP SERPL-CCNC: 102 U/L
ALT SERPL W/O P-5'-P-CCNC: 59 U/L
ANION GAP SERPL CALC-SCNC: 12 MMOL/L
AST SERPL-CCNC: 51 U/L
BASOPHILS # BLD AUTO: 0.04 K/UL
BASOPHILS NFR BLD: 0.7 %
BILIRUB SERPL-MCNC: 0.3 MG/DL
BUN SERPL-MCNC: 32 MG/DL
CALCIUM SERPL-MCNC: 9.2 MG/DL
CHLORIDE SERPL-SCNC: 89 MMOL/L
CO2 SERPL-SCNC: 39 MMOL/L
CREAT SERPL-MCNC: 1.05 MG/DL
DIFFERENTIAL METHOD: ABNORMAL
EOSINOPHIL # BLD AUTO: 0.2 K/UL
EOSINOPHIL NFR BLD: 2.9 %
ERYTHROCYTE [DISTWIDTH] IN BLOOD BY AUTOMATED COUNT: 11.9 %
EST. GFR  (AFRICAN AMERICAN): >60 ML/MIN/1.73 M^2
EST. GFR  (NON AFRICAN AMERICAN): 52 ML/MIN/1.73 M^2
FERRITIN SERPL-MCNC: 24 NG/ML
GLUCOSE SERPL-MCNC: 78 MG/DL
HCT VFR BLD AUTO: 34.7 %
HGB BLD-MCNC: 11.1 G/DL
IRON SATN MFR SERPL: 15 %
IRON SERPL-MCNC: 54 UG/DL
LYMPHOCYTES # BLD AUTO: 0.8 K/UL
LYMPHOCYTES NFR BLD: 14.6 %
MCH RBC QN AUTO: 30.3 PG
MCHC RBC AUTO-ENTMCNC: 32 G/DL
MCV RBC AUTO: 95 FL
MONOCYTES # BLD AUTO: 0.8 K/UL
MONOCYTES NFR BLD: 13.7 %
NEUTROPHILS # BLD AUTO: 3.8 K/UL
NEUTROPHILS NFR BLD: 68.1 %
NRBC BLD-RTO: 0 /100 WBC
PLATELET # BLD AUTO: 201 K/UL
PMV BLD AUTO: 11.1 FL
POTASSIUM SERPL-SCNC: 2.8 MMOL/L
PROT SERPL-MCNC: 7.1 G/DL
RBC # BLD AUTO: 3.66 M/UL
SODIUM SERPL-SCNC: 140 MMOL/L
TOTAL IRON BINDING CAPACITY: 355 UG/DL
VIT B12 SERPL-MCNC: >1000 PG/ML
WBC # BLD AUTO: 5.54 K/UL

## 2018-07-13 PROCEDURE — 80053 COMPREHEN METABOLIC PANEL: CPT

## 2018-07-13 PROCEDURE — 84238 ASSAY NONENDOCRINE RECEPTOR: CPT

## 2018-07-13 PROCEDURE — 82728 ASSAY OF FERRITIN: CPT | Mod: PN

## 2018-07-13 PROCEDURE — 85025 COMPLETE CBC W/AUTO DIFF WBC: CPT

## 2018-07-13 PROCEDURE — 82607 VITAMIN B-12: CPT

## 2018-07-13 PROCEDURE — 82607 VITAMIN B-12: CPT | Mod: PN

## 2018-07-13 PROCEDURE — 85025 COMPLETE CBC W/AUTO DIFF WBC: CPT | Mod: PN

## 2018-07-13 PROCEDURE — 80053 COMPREHEN METABOLIC PANEL: CPT | Mod: PN

## 2018-07-13 PROCEDURE — 83540 ASSAY OF IRON: CPT | Mod: PN

## 2018-07-13 PROCEDURE — 82728 ASSAY OF FERRITIN: CPT

## 2018-07-13 PROCEDURE — 83540 ASSAY OF IRON: CPT

## 2018-07-16 ENCOUNTER — HOSPITAL ENCOUNTER (OUTPATIENT)
Dept: RADIOLOGY | Facility: HOSPITAL | Age: 73
Discharge: HOME | End: 2018-07-16
Attending: INTERNAL MEDICINE
Payer: MEDICARE

## 2018-07-16 DIAGNOSIS — I27.20 PULMONARY HYPERTENSION: ICD-10-CM

## 2018-07-16 LAB — STFR SERPL-MCNC: 3 MG/L

## 2018-07-16 PROCEDURE — 71046 X-RAY EXAM CHEST 2 VIEWS: CPT | Mod: 26,,, | Performed by: RADIOLOGY

## 2018-07-16 PROCEDURE — 71046 X-RAY EXAM CHEST 2 VIEWS: CPT | Mod: TC,FY,PO

## 2018-07-17 RX ORDER — PROMETHAZINE HYDROCHLORIDE 25 MG/1
SUPPOSITORY RECTAL
Qty: 12 SUPPOSITORY | Refills: 0 | Status: SHIPPED | OUTPATIENT
Start: 2018-07-17 | End: 2019-03-01

## 2018-07-20 ENCOUNTER — TELEPHONE (OUTPATIENT)
Dept: HEMATOLOGY/ONCOLOGY | Facility: CLINIC | Age: 73
End: 2018-07-20

## 2018-07-20 ENCOUNTER — LAB VISIT (OUTPATIENT)
Dept: LAB | Facility: HOSPITAL | Age: 73
End: 2018-07-20
Attending: INTERNAL MEDICINE
Payer: MEDICARE

## 2018-07-20 ENCOUNTER — OFFICE VISIT (OUTPATIENT)
Dept: HEMATOLOGY/ONCOLOGY | Facility: CLINIC | Age: 73
End: 2018-07-20
Payer: MEDICARE

## 2018-07-20 VITALS
HEIGHT: 64 IN | SYSTOLIC BLOOD PRESSURE: 158 MMHG | HEART RATE: 77 BPM | WEIGHT: 117.94 LBS | RESPIRATION RATE: 20 BRPM | BODY MASS INDEX: 20.14 KG/M2 | TEMPERATURE: 99 F | DIASTOLIC BLOOD PRESSURE: 70 MMHG

## 2018-07-20 DIAGNOSIS — E53.8 B12 DEFICIENCY: ICD-10-CM

## 2018-07-20 DIAGNOSIS — E87.6 HYPOKALEMIA: Primary | ICD-10-CM

## 2018-07-20 DIAGNOSIS — D50.8 OTHER IRON DEFICIENCY ANEMIA: ICD-10-CM

## 2018-07-20 DIAGNOSIS — Z98.84 GASTRIC BYPASS STATUS FOR OBESITY: ICD-10-CM

## 2018-07-20 DIAGNOSIS — I27.20 PULMONARY HYPERTENSION: ICD-10-CM

## 2018-07-20 DIAGNOSIS — E87.6 HYPOKALEMIA: ICD-10-CM

## 2018-07-20 LAB
ANION GAP SERPL CALC-SCNC: 8 MMOL/L
BUN SERPL-MCNC: 34 MG/DL
CALCIUM SERPL-MCNC: 8.9 MG/DL
CHLORIDE SERPL-SCNC: 91 MMOL/L
CO2 SERPL-SCNC: 39 MMOL/L
CREAT SERPL-MCNC: 1.08 MG/DL
EST. GFR  (AFRICAN AMERICAN): 58 ML/MIN/1.73 M^2
EST. GFR  (NON AFRICAN AMERICAN): 51 ML/MIN/1.73 M^2
GLUCOSE SERPL-MCNC: 87 MG/DL
POTASSIUM SERPL-SCNC: 3 MMOL/L
SODIUM SERPL-SCNC: 138 MMOL/L

## 2018-07-20 PROCEDURE — 80048 BASIC METABOLIC PNL TOTAL CA: CPT

## 2018-07-20 PROCEDURE — 99499 UNLISTED E&M SERVICE: CPT | Mod: S$GLB,,, | Performed by: INTERNAL MEDICINE

## 2018-07-20 PROCEDURE — 80048 BASIC METABOLIC PNL TOTAL CA: CPT | Mod: PN

## 2018-07-20 PROCEDURE — 36415 COLL VENOUS BLD VENIPUNCTURE: CPT | Mod: PN

## 2018-07-20 PROCEDURE — 99214 OFFICE O/P EST MOD 30 MIN: CPT | Mod: S$GLB,,, | Performed by: INTERNAL MEDICINE

## 2018-07-20 RX ORDER — CETIRIZINE HYDROCHLORIDE 10 MG/1
10 TABLET ORAL DAILY
COMMUNITY
End: 2019-03-04 | Stop reason: ALTCHOICE

## 2018-07-20 RX ORDER — CYCLOSPORINE 0.5 MG/ML
EMULSION OPHTHALMIC
Refills: 5 | COMMUNITY
Start: 2018-07-13 | End: 2018-07-20 | Stop reason: SDUPTHER

## 2018-07-20 RX ORDER — HYDROCODONE BITARTRATE AND ACETAMINOPHEN 5; 325 MG/1; MG/1
TABLET ORAL
Refills: 0 | COMMUNITY
Start: 2018-07-18 | End: 2018-09-06

## 2018-07-20 RX ORDER — TRAZODONE HYDROCHLORIDE 150 MG/1
TABLET ORAL
Refills: 3 | COMMUNITY
Start: 2018-07-03 | End: 2018-07-20 | Stop reason: SDUPTHER

## 2018-07-20 RX ORDER — METOLAZONE 5 MG/1
5 TABLET ORAL DAILY PRN
Refills: 3 | Status: ON HOLD | COMMUNITY
Start: 2018-06-28 | End: 2019-04-19 | Stop reason: HOSPADM

## 2018-07-20 NOTE — TELEPHONE ENCOUNTER
Spoke with Pt. Pt notified that Dr Lee reviewed her BMP and pt's K+ was low. Pt advised per Dr Lee to take KDur 20meq tab 1 now and 1 in 4hours, tomorrow 7/21/18 pt is to take 1 tab TID, then after pt to take 1 tab BID. Pt is also to notify Dr Jacobsen. Pt scheduled for 5 month f/u with cbc/cmp lab prior. Pt verbalized understanding.

## 2018-07-20 NOTE — PROGRESS NOTES
Subjective:       Patient ID: Dulce Castro is a 73 y.o. female.    Chief Complain  HPI:   A 70-year-old  woman, very well known to me. The patient has pulmonary  hypertension, coronary artery disease, anemia, neutropenia and had a history of  gastric bypass and bipolar disorder. She has been following clinically. . She follows with   pulmonologist, and she follows with Cardiology as well.lately the tegretol has been lowered. Not drinking, no other drugs.     chronic headaches , seeing headache specialist  dR james, had lost weight , takes lasix, takes K states she feels weak and wobbly occ, gets botox for migraines, recently she started taking protein shakes to improve her weight and it has helped some  REVIEW OF SYSTEMS:   Has pacemaker  No CP occ palpitations, feels SOB on minial activity from pulmonary HTN, c/o of headaches, has an appt with a headache specialist   denies abd pain, nausea, vomiting, cough or sputum production   occ feet swelling  Not had a capsule study  PHYSICAL EXAM:     Wt Readings from Last 3 Encounters:   07/20/18 53.5 kg (117 lb 15.1 oz)   06/12/18 53.7 kg (118 lb 6.2 oz)   03/08/18 51.1 kg (112 lb 10.5 oz)     Temp Readings from Last 3 Encounters:   07/20/18 99.2 °F (37.3 °C) (Oral)   03/01/18 98.1 °F (36.7 °C) (Oral)   12/19/17 98.6 °F (37 °C) (Oral)     BP Readings from Last 3 Encounters:   07/20/18 (!) 158/70   06/12/18 (!) 166/72   03/08/18 132/76     Pulse Readings from Last 3 Encounters:   07/20/18 77   06/12/18 71   03/08/18 67     GENERAL: Comfortable looking patient. Patient is in no distress.  Awake, alert and oriented to time, person and place.  No anxiety, or agitation.  Frail appearance    HEENT: Normal conjunctivae and eyelids. WNL.  PERRLA 3 to 4 mm. No icterus, no pallor, no congestion, and no discharge noted.     NECK:  Supple. Trachea is central.  No crepitus.  No JVD or masses.    RESPIRATORY:  No intercostal retractions.  No dullness to  percussion.  Chest is clear to auscultation.  No rales, rhonchi or wheezes.  No crepitus.  Good air entry bilaterally.    CARDIOVASCULAR:  S1 and S2 are normally heard without murmurs or gallops.  All peripheral pulses are present.    ABDOMEN:  Normal abdomen.  No hepatosplenomegaly.  No free fluid.  Bowel sounds are present.  No hernia noted. No masses.  No rebound or tenderness.  No guarding or rigidity.  Umbilicus is midline.    LYMPHATICS:  No axillary, cervical, supraclavicular, submental, or inguinal lymphadenopathy.    SKIN/MUSCULOSKELETAL:  There is no evidence of excoriation marks or ecchmosis.  No rashes.  No cyanosis.  No clubbing.  No joint or skeletal deformities noted.  Normal range of motion.    NEUROLOGIC:  Higher functions are appropriate.  No cranial nerve deficits.  Normal sebastián.  Normal strength.  Motor and sensory functions are normal.  Deep tendon reflexes are normal.    GENITAL/RECTAL:  Exams are deferred.      Laboratory:     CBC:  Lab Results   Component Value Date    WBC 5.54 07/13/2018    RBC 3.66 (L) 07/13/2018    HGB 11.1 (L) 07/13/2018    HCT 34.7 (L) 07/13/2018    MCV 95 07/13/2018    MCH 30.3 07/13/2018    MCHC 32.0 07/13/2018    RDW 11.9 07/13/2018     07/13/2018    MPV 11.1 07/13/2018    GRAN 3.8 07/13/2018    GRAN 68.1 07/13/2018    LYMPH 0.8 (L) 07/13/2018    LYMPH 14.6 (L) 07/13/2018    MONO 0.8 07/13/2018    MONO 13.7 07/13/2018    EOS 0.2 07/13/2018    BASO 0.04 07/13/2018    EOSINOPHIL 2.9 07/13/2018    BASOPHIL 0.7 07/13/2018       BMP: BMP  Lab Results   Component Value Date     07/13/2018    K 2.8 (L) 07/13/2018    CL 89 (L) 07/13/2018    CO2 39 (H) 07/13/2018    BUN 32 (H) 07/13/2018    CREATININE 1.05 07/13/2018    CALCIUM 9.2 07/13/2018    ANIONGAP 12 07/13/2018    ESTGFRAFRICA >60 07/13/2018    EGFRNONAA 52 (A) 07/13/2018       LFT:   Lab Results   Component Value Date    ALT 59 (H) 07/13/2018    AST 51 (H) 07/13/2018    ALKPHOS 102 07/13/2018    BILITOT 0.3  07/13/2018     Lab Results   Component Value Date    NQEZKSTU06 >1000 (H) 07/13/2018     Haptoglobin was low  Assessment/Plan:   hgb has stabilized, may have had a component of hemolysis.   Vish neg haptoglobin has normalsied .   Cbc, cmp   hbg stable   K is low, she takes zaroxylin and lasix stat k now and we will  Phrev. She needs to notify her pcp about recurrent hypokalemia   rtc 4 months with cbc, cmp,    bipolar dz: well controlled, chronic hradaches? Hypoxic, vascular, seeing headache specialist  Tegretol has been given by Dr Ness Haji for bipolar dz.  Cont f/u kasey Nelson headache speciaity

## 2018-07-23 ENCOUNTER — TELEPHONE (OUTPATIENT)
Dept: FAMILY MEDICINE | Facility: CLINIC | Age: 73
End: 2018-07-23

## 2018-07-23 ENCOUNTER — TELEPHONE (OUTPATIENT)
Dept: CARDIOLOGY | Facility: CLINIC | Age: 73
End: 2018-07-23

## 2018-07-23 DIAGNOSIS — I10 ESSENTIAL HYPERTENSION, MALIGNANT: ICD-10-CM

## 2018-07-23 DIAGNOSIS — I34.89 MYXOID TRANSFORMATION OF MITRAL VALVE: ICD-10-CM

## 2018-07-23 DIAGNOSIS — E87.6 HYPOKALEMIA: Primary | ICD-10-CM

## 2018-07-23 DIAGNOSIS — I35.1 AORTIC INCOMPETENCE: Primary | ICD-10-CM

## 2018-07-23 DIAGNOSIS — I27.20 PROGRESSIVE PULMONARY HYPERTENSION: ICD-10-CM

## 2018-07-23 NOTE — TELEPHONE ENCOUNTER
----- Message from Marjorie Romero sent at 7/21/2018  7:40 AM CDT -----  Type: Needs Medical Advice    Who Called:  Self   Symptoms (please be specific):  NA How long has patient had these symptoms:   Pharmacy name and phone #:    Best Call Back Number: [phone]  Additional Information: Patient states she was seen by hematologist yesterday, patient's potassium level was low. . Patient was advised to take two potassium pills yesterday, three potassium today, two potassium pill on Sunday.

## 2018-07-23 NOTE — TELEPHONE ENCOUNTER
----- Message from Riya Johnson sent at 7/23/2018  9:49 AM CDT -----  Type:  Sooner Apoointment Request    Caller is requesting a sooner appointment.  Caller declined first available appointment listed below.  Caller will not accept being placed on the waitlist and is requesting a message be sent to doctor.    Name of Caller:pt When is the first available appointment? Na Symptoms:  6 monthsBest Call Back Number: [phone]  Additional Information:     Pt  Needs  An  Echo  Order and  Needs to  Book that   first  Before seeing

## 2018-07-23 NOTE — TELEPHONE ENCOUNTER
Contacted patient to verify that Dr. Scott did not put orders in for her to have an echo done.  I did schedule patient nine months follow up with Dr in office.  Patient agrees and understands.

## 2018-08-01 NOTE — TELEPHONE ENCOUNTER
Attempted to contact pt to schedule asap bmp . Left message for pt to call back clinic. Number provided.

## 2018-08-03 DIAGNOSIS — Z12.39 BREAST CANCER SCREENING: ICD-10-CM

## 2018-08-16 ENCOUNTER — CLINICAL SUPPORT (OUTPATIENT)
Dept: CARDIOLOGY | Facility: CLINIC | Age: 73
End: 2018-08-16
Attending: INTERNAL MEDICINE
Payer: MEDICARE

## 2018-08-16 VITALS
HEIGHT: 64 IN | HEART RATE: 62 BPM | SYSTOLIC BLOOD PRESSURE: 130 MMHG | DIASTOLIC BLOOD PRESSURE: 60 MMHG | WEIGHT: 117 LBS | BODY MASS INDEX: 19.97 KG/M2

## 2018-08-16 DIAGNOSIS — I34.89 MYXOID TRANSFORMATION OF MITRAL VALVE: ICD-10-CM

## 2018-08-16 DIAGNOSIS — I35.1 AORTIC INCOMPETENCE: ICD-10-CM

## 2018-08-16 DIAGNOSIS — I10 ESSENTIAL HYPERTENSION, MALIGNANT: ICD-10-CM

## 2018-08-16 DIAGNOSIS — I27.20 PROGRESSIVE PULMONARY HYPERTENSION: ICD-10-CM

## 2018-08-16 PROCEDURE — 99999 PR PBB SHADOW E&M-EST. PATIENT-LVL II: CPT | Mod: PBBFAC,,,

## 2018-08-16 PROCEDURE — 93306 TTE W/DOPPLER COMPLETE: CPT | Mod: S$GLB,,, | Performed by: INTERNAL MEDICINE

## 2018-08-17 NOTE — TELEPHONE ENCOUNTER
----- Message from Vicki Hong sent at 8/17/2018 11:39 AM CDT -----  Contact: patient  Refill request  tiZANidine (ZANAFLEX) 4 MG tablet   Callback number [phone]    [facility] - [place] - [address] [place] [address]  Phone: [phone] Fax: [phone]

## 2018-08-19 RX ORDER — TIZANIDINE 4 MG/1
4 TABLET ORAL EVERY 8 HOURS PRN
Qty: 60 TABLET | Refills: 5 | Status: SHIPPED | OUTPATIENT
Start: 2018-08-19 | End: 2019-01-06 | Stop reason: SDUPTHER

## 2018-08-21 LAB
ASCENDING AORTA: 2.66 CM
AV MEAN GRADIENT: 8.51 MMHG
AV PEAK GRADIENT: 16.74 MMHG
AV REGURGITATION PRESSURE HALF TIME: 460 MS
AV VALVE AREA: 1.82 CM2
BSA FOR ECHO PROCEDURE: 1.55 M2
CV ECHO LV RWT: 0.46 CM
DOP CALC AO PEAK VEL: 2.05 M/S
DOP CALC AO VTI: 44.6 CM
DOP CALC LVOT AREA: 3.14 CM2
DOP CALC LVOT DIAMETER: 2 CM
DOP CALC LVOT STROKE VOLUME: 81.04 CM3
DOP CALCLVOT PEAK VEL VTI: 25.81 CM
E WAVE DECELERATION TIME: 251.56 MSEC
E/A RATIO: 3.1
E/E' RATIO: 11.55
ECHO LV POSTERIOR WALL: 0.79 CM (ref 0.6–1.1)
FRACTIONAL SHORTENING: 43 % (ref 28–44)
INTERVENTRICULAR SEPTUM: 0.86 CM (ref 0.6–1.1)
IVRT: 0.09 MSEC
LA MAJOR: 5.31 CM
LA MINOR: 5.67 CM
LA WIDTH: 4.58 CM
LEFT ATRIUM SIZE: 4.21 CM
LEFT ATRIUM VOLUME INDEX: 58 ML/M2
LEFT ATRIUM VOLUME: 89.88 CM3
LEFT INTERNAL DIMENSION IN SYSTOLE: 2.02 CM (ref 2.1–4)
LEFT VENTRICLE MASS INDEX: 52.3 G/M2
LEFT VENTRICULAR INTERNAL DIMENSION IN DIASTOLE: 3.57 CM (ref 3.5–6)
LEFT VENTRICULAR MASS: 81.09 G
LV LATERAL E/E' RATIO: 10.58
LV SEPTAL E/E' RATIO: 12.7
MV PEAK A VEL: 0.41 M/S
MV PEAK E VEL: 1.27 M/S
MV STENOSIS PRESSURE HALF TIME: 72.95 MS
MV VALVE AREA P 1/2 METHOD: 3.02 CM2
PISA TR MAX VEL: 3.44 M/S
PULM VEIN S/D RATIO: 0.73
PV PEAK D VEL: 0.74 M/S
PV PEAK S VEL: 0.54 M/S
RA MAJOR: 4.28 CM
RA PRESSURE: 3 MMHG
RA WIDTH: 3.6 CM
RIGHT VENTRICULAR END-DIASTOLIC DIMENSION: 4.3 CM
SINUS: 2.83 CM
STJ: 2.59 CM
TDI LATERAL: 0.12
TDI SEPTAL: 0.1
TDI: 0.11
TR MAX PG: 47.33 MMHG
TV REST PULMONARY ARTERY PRESSURE: 50.33 MMHG

## 2018-08-27 ENCOUNTER — CLINICAL SUPPORT (OUTPATIENT)
Dept: CARDIOLOGY | Facility: CLINIC | Age: 73
End: 2018-08-27
Attending: INTERNAL MEDICINE
Payer: MEDICARE

## 2018-08-27 DIAGNOSIS — I49.5 SSS (SICK SINUS SYNDROME): ICD-10-CM

## 2018-08-27 DIAGNOSIS — Z95.0 CARDIAC PACEMAKER IN SITU: ICD-10-CM

## 2018-08-27 PROCEDURE — 93296 REM INTERROG EVL PM/IDS: CPT | Mod: PBBFAC,PO | Performed by: INTERNAL MEDICINE

## 2018-08-27 PROCEDURE — 93294 REM INTERROG EVL PM/LDLS PM: CPT | Mod: ,,, | Performed by: INTERNAL MEDICINE

## 2018-09-04 ENCOUNTER — PROCEDURE VISIT (OUTPATIENT)
Dept: NEUROLOGY | Facility: CLINIC | Age: 73
End: 2018-09-04
Payer: MEDICARE

## 2018-09-04 VITALS
SYSTOLIC BLOOD PRESSURE: 150 MMHG | DIASTOLIC BLOOD PRESSURE: 70 MMHG | HEIGHT: 64 IN | WEIGHT: 120 LBS | BODY MASS INDEX: 20.49 KG/M2 | HEART RATE: 88 BPM | RESPIRATION RATE: 16 BRPM

## 2018-09-04 DIAGNOSIS — G43.719 INTRACTABLE CHRONIC MIGRAINE WITHOUT AURA AND WITHOUT STATUS MIGRAINOSUS: Primary | ICD-10-CM

## 2018-09-04 PROCEDURE — 64615 CHEMODENERV MUSC MIGRAINE: CPT | Mod: S$PBB,,, | Performed by: PSYCHIATRY & NEUROLOGY

## 2018-09-04 PROCEDURE — 64615 CHEMODENERV MUSC MIGRAINE: CPT | Mod: PBBFAC,PO | Performed by: PSYCHIATRY & NEUROLOGY

## 2018-09-04 RX ORDER — BUTALBITAL, ACETAMINOPHEN AND CAFFEINE 50; 325; 40 MG/1; MG/1; MG/1
1 TABLET ORAL EVERY 6 HOURS PRN
Qty: 12 TABLET | Refills: 2 | Status: SHIPPED | OUTPATIENT
Start: 2018-09-04 | End: 2018-10-04

## 2018-09-04 RX ADMIN — ONABOTULINUMTOXINA 200 UNITS: 100 INJECTION, POWDER, LYOPHILIZED, FOR SOLUTION INTRADERMAL; INTRAMUSCULAR at 10:09

## 2018-09-04 NOTE — PROCEDURES
Procedures   BOTOX PROCEDURE    PROCEDURE PERFORMED: Botulinum toxin injection (55880)    CLINICAL INDICATION: G43.719  NDC: 1662-3740-31    A time out was conducted just before the start of the procedure to verify the correct patient and procedure, procedure location, and all relevant critical information.       This is the first Botox injections and I am aiming for at least 50%  improvement in the patient's symptoms. Frequency of treatment is every 3 months unless no response to the treatments, at which time we will discontinue the injections.     DESCRIPTION OF PROCEDURE: After obtaining informed consent and under aseptic technique, a total of 155 units of botulinum toxin type A were injected in the following muscles: Procerus 5 units,  5 units bilaterally, frontalis 20 units, temporalis 20 units bilaterally, occipitalis 15 units, upper cervical paraspinals 10 units bilaterally and trapezius 15 units bilaterally. The patient was given a total of 155 units in 31 sites.The patient tolerated the procedure well. There were no complications. The patient was given a prescription for repeat treatment in 3 months.     Unavoidable waste 45 units          Yousif Nelson M.D  Medical Director, Headache and Facial Pain  [facility]

## 2018-09-06 ENCOUNTER — OFFICE VISIT (OUTPATIENT)
Dept: CARDIOLOGY | Facility: CLINIC | Age: 73
End: 2018-09-06
Payer: MEDICARE

## 2018-09-06 VITALS
HEIGHT: 65 IN | WEIGHT: 119.81 LBS | HEART RATE: 69 BPM | DIASTOLIC BLOOD PRESSURE: 68 MMHG | SYSTOLIC BLOOD PRESSURE: 155 MMHG | BODY MASS INDEX: 19.96 KG/M2

## 2018-09-06 DIAGNOSIS — I10 ESSENTIAL HYPERTENSION: ICD-10-CM

## 2018-09-06 DIAGNOSIS — Z98.890 S/P MVR (MITRAL VALVE REPAIR): ICD-10-CM

## 2018-09-06 DIAGNOSIS — I25.10 CORONARY ARTERY DISEASE INVOLVING NATIVE CORONARY ARTERY OF NATIVE HEART WITHOUT ANGINA PECTORIS: Primary | ICD-10-CM

## 2018-09-06 DIAGNOSIS — Z95.0 PACEMAKER: ICD-10-CM

## 2018-09-06 PROCEDURE — 99214 OFFICE O/P EST MOD 30 MIN: CPT | Mod: S$PBB,,, | Performed by: INTERNAL MEDICINE

## 2018-09-06 PROCEDURE — 99213 OFFICE O/P EST LOW 20 MIN: CPT | Mod: PBBFAC,PO | Performed by: INTERNAL MEDICINE

## 2018-09-06 PROCEDURE — 99999 PR PBB SHADOW E&M-EST. PATIENT-LVL III: CPT | Mod: PBBFAC,,, | Performed by: INTERNAL MEDICINE

## 2018-09-06 PROCEDURE — 3077F SYST BP >= 140 MM HG: CPT | Mod: CPTII,,, | Performed by: INTERNAL MEDICINE

## 2018-09-06 PROCEDURE — 3078F DIAST BP <80 MM HG: CPT | Mod: CPTII,,, | Performed by: INTERNAL MEDICINE

## 2018-09-06 PROCEDURE — 1101F PT FALLS ASSESS-DOCD LE1/YR: CPT | Mod: CPTII,,, | Performed by: INTERNAL MEDICINE

## 2018-09-06 NOTE — PROGRESS NOTES
Subjective:    Patient ID:  Dulce Castro is a 73 y.o. female who presents for follow-up of MVR    HPI  She comes with no complaints, no chest pain, no shortness of breath  FC II    Review of Systems   Constitution: Negative for decreased appetite, weakness, malaise/fatigue, weight gain and weight loss.   Cardiovascular: Negative for chest pain, dyspnea on exertion, leg swelling, palpitations and syncope.   Respiratory: Negative for cough and shortness of breath.    Gastrointestinal: Negative.    All other systems reviewed and are negative.       Objective:      Physical Exam   Constitutional: She is oriented to person, place, and time. She appears well-developed and well-nourished.   HENT:   Head: Normocephalic.   Eyes: Pupils are equal, round, and reactive to light.   Neck: Normal range of motion. Neck supple. No JVD present. Carotid bruit is not present. No thyromegaly present.   Cardiovascular: Normal rate, regular rhythm, intact distal pulses and normal pulses. PMI is not displaced. Exam reveals no gallop.   Murmur heard.   Harsh midsystolic murmur is present with a grade of 3/6 at the upper right sternal border radiating to the neck.  High-pitched blowing holosystolic murmur of grade 3/6 is also present at the apex.  Pulmonary/Chest: Effort normal and breath sounds normal.   Abdominal: Soft. Normal appearance. She exhibits no mass. There is no hepatosplenomegaly. There is no tenderness.   Musculoskeletal: Normal range of motion. She exhibits no edema.   Neurological: She is alert and oriented to person, place, and time. She has normal strength and normal reflexes. No sensory deficit.   Skin: Skin is warm and intact.   Psychiatric: She has a normal mood and affect.   Nursing note and vitals reviewed.        Assessment:       1. Coronary artery disease involving native coronary artery of native heart without angina pectoris    2. S/P MVR (mitral valve repair)    3. Pacemaker    4. Essential  hypertension         Plan:     Continue all cardiac medications  Regular exercise program  1 yr f/u

## 2018-09-11 ENCOUNTER — TELEPHONE (OUTPATIENT)
Dept: NEUROLOGY | Facility: CLINIC | Age: 73
End: 2018-09-11

## 2018-09-11 RX ORDER — HYDROCODONE BITARTRATE AND ACETAMINOPHEN 5; 325 MG/1; MG/1
1 TABLET ORAL EVERY 6 HOURS PRN
Qty: 10 TABLET | Refills: 0 | Status: SHIPPED | OUTPATIENT
Start: 2018-09-11 | End: 2019-03-01

## 2018-09-11 NOTE — TELEPHONE ENCOUNTER
----- Message from Samuel Goode sent at 9/11/2018 11:57 AM CDT -----  Contact: pt  Pt states what was given for pain is not working.  Pt is requesting something else.  Please call and advise.    Call Back #: [phone]  Thanks      [facility] [place] - [id number] [facility] [address]  Phone: [phone] Fax: [phone]

## 2018-09-11 NOTE — TELEPHONE ENCOUNTER
"Called patient and informed her that Dr Nelson sent over a pain medication for her migraines, informed patient that per Dr Nelson " I sent a limited amount of Norco (#10) for rescue. Please advise her that it must last the full month "  Patient verbalized understanding.   "

## 2018-09-14 LAB
AV DELAY - LONGEST: 410 MSEC
AV DELAY - SHORTEST: 260 MSEC
IMPEDANCE RA LEAD (NATIVE): 546 OHMS
IMPEDANCE RA LEAD: 488 OHMS
OHS CV DC PP MS1: 0.4 MS
OHS CV DC PP MS2: 0.4 MS
OHS CV DC PP V1: 2 V
OHS CV DC PP V2: 2 V
P/R-WAVE RA LEAD (NATIVE): 10.7 MV
P/R-WAVE RA LEAD: 1.2 MV
PV DELAY - LONGEST: 390 MSEC
PV DELAY - SHORTEST: 240 MSEC

## 2018-09-17 DIAGNOSIS — I49.5 SSS (SICK SINUS SYNDROME): ICD-10-CM

## 2018-09-17 DIAGNOSIS — Z95.0 CARDIAC PACEMAKER IN SITU: Primary | ICD-10-CM

## 2018-09-24 DIAGNOSIS — G43.711 INTRACTABLE CHRONIC MIGRAINE WITHOUT AURA AND WITH STATUS MIGRAINOSUS: Primary | ICD-10-CM

## 2018-09-25 ENCOUNTER — TELEPHONE (OUTPATIENT)
Dept: NEUROLOGY | Facility: CLINIC | Age: 73
End: 2018-09-25

## 2018-09-25 RX ORDER — BUTALBITAL, ACETAMINOPHEN AND CAFFEINE 50; 325; 40 MG/1; MG/1; MG/1
1 TABLET ORAL EVERY 6 HOURS PRN
Qty: 12 TABLET | Refills: 2 | OUTPATIENT
Start: 2018-09-25 | End: 2018-10-25

## 2018-09-25 NOTE — TELEPHONE ENCOUNTER
Returned call and spoke with Ash at [email or web address]. Patient is requesting a refill on Fioricet. Patient recently has prescribed Fioricet on 9/4 with 2 additional refills. Patient had prescription filled on 9/4, 9/10, and 9/17. Please advise.

## 2018-09-25 NOTE — TELEPHONE ENCOUNTER
----- Message from Marly Morocho sent at 9/25/2018  4:22 PM CDT -----  Type:  Patient Returning Call    Who Called: Patient  Who Left Message for Patient:  Gisela  Does the patient know what this is regarding?:  yes  Best Call Back Number:  [phone] (home)     Additional Information:  Na

## 2018-09-25 NOTE — TELEPHONE ENCOUNTER
----- Message from Merle Guevara sent at 9/25/2018  1:00 PM CDT -----  Contact: [facility]  Refill request faxed over yesterday for butalbital-acetaminophen-caffeine -40 mg (FIORICET, ESGIC) -40 mg per tablet . Please call back at     [facility] - [address] [address]  Phone: [phone] Fax: [phone]

## 2018-09-25 NOTE — TELEPHONE ENCOUNTER
----- Message from Letty Escoto MD sent at 9/25/2018  3:18 PM CDT -----  Fioricet refill denied due to exceeding monthly quantity and patient being in overuse / at risk for overuse. Dr Nelson had also previously given her 10 tabs norco with the specific instructions it must last the whole month so I am concerned about her in general overusing medications.    She may come in for a nerve block Wed or Thur if she has severe migraine currently

## 2018-09-26 ENCOUNTER — TELEPHONE (OUTPATIENT)
Dept: NEUROLOGY | Facility: CLINIC | Age: 73
End: 2018-09-26

## 2018-09-26 NOTE — TELEPHONE ENCOUNTER
----- Message from Carmel Zepeda sent at 9/26/2018 10:12 AM CDT -----  Contact: Patient  Type:  Patient Returning Call    Who Called: patient  Who Left Message for Patient:  Gisela Lao  Does the patient know what this is regarding?:  yes  Best Call Back Number:  [phone] (home)

## 2018-09-26 NOTE — TELEPHONE ENCOUNTER
Returned call and spoke with patient. Advised patient that we could not refill both her Fioricet and Norco due to possible overuse and it could cause possible rebound headaches. Patient scheduled with Dr. Escoto for a nerve block on 09/27/2018 at 10 am. Patient verbalized understanding.

## 2018-09-27 ENCOUNTER — PROCEDURE VISIT (OUTPATIENT)
Dept: NEUROLOGY | Facility: CLINIC | Age: 73
End: 2018-09-27
Payer: MEDICARE

## 2018-09-27 VITALS
WEIGHT: 118.25 LBS | DIASTOLIC BLOOD PRESSURE: 74 MMHG | BODY MASS INDEX: 20.19 KG/M2 | HEIGHT: 64 IN | HEART RATE: 70 BPM | RESPIRATION RATE: 16 BRPM | SYSTOLIC BLOOD PRESSURE: 158 MMHG

## 2018-09-27 DIAGNOSIS — M79.18 MYOFASCIAL PAIN SYNDROME, CERVICAL: ICD-10-CM

## 2018-09-27 DIAGNOSIS — M54.81 BILATERAL OCCIPITAL NEURALGIA: Primary | ICD-10-CM

## 2018-09-27 DIAGNOSIS — M79.2 NEURALGIA: ICD-10-CM

## 2018-09-27 PROCEDURE — 64450 NJX AA&/STRD OTHER PN/BRANCH: CPT | Mod: 50,PBBFAC,PO,59 | Performed by: PSYCHIATRY & NEUROLOGY

## 2018-09-27 PROCEDURE — 64405 NJX AA&/STRD GR OCPL NRV: CPT | Mod: 50,PBBFAC,PO | Performed by: PSYCHIATRY & NEUROLOGY

## 2018-09-27 PROCEDURE — 64450 NJX AA&/STRD OTHER PN/BRANCH: CPT | Mod: 50,S$PBB,59, | Performed by: PSYCHIATRY & NEUROLOGY

## 2018-09-27 PROCEDURE — 64405 NJX AA&/STRD GR OCPL NRV: CPT | Mod: 59,50,S$PBB, | Performed by: PSYCHIATRY & NEUROLOGY

## 2018-09-27 PROCEDURE — 20553 NJX 1/MLT TRIGGER POINTS 3/>: CPT | Mod: PBBFAC,PO | Performed by: PSYCHIATRY & NEUROLOGY

## 2018-09-27 PROCEDURE — 20553 NJX 1/MLT TRIGGER POINTS 3/>: CPT | Mod: 50,S$PBB,, | Performed by: PSYCHIATRY & NEUROLOGY

## 2018-09-27 RX ORDER — BUPIVACAINE HYDROCHLORIDE 2.5 MG/ML
20 INJECTION, SOLUTION EPIDURAL; INFILTRATION; INTRACAUDAL ONCE
Status: COMPLETED | OUTPATIENT
Start: 2018-09-27 | End: 2018-09-27

## 2018-09-27 RX ORDER — TRIAMCINOLONE ACETONIDE 40 MG/ML
40 INJECTION, SUSPENSION INTRA-ARTICULAR; INTRAMUSCULAR ONCE
Status: COMPLETED | OUTPATIENT
Start: 2018-09-27 | End: 2018-09-27

## 2018-09-27 RX ORDER — LIDOCAINE HYDROCHLORIDE 10 MG/ML
5 INJECTION, SOLUTION EPIDURAL; INFILTRATION; INTRACAUDAL; PERINEURAL ONCE
Status: COMPLETED | OUTPATIENT
Start: 2018-09-27 | End: 2018-09-27

## 2018-09-27 RX ADMIN — TRIAMCINOLONE ACETONIDE 40 MG: 40 INJECTION, SUSPENSION INTRA-ARTICULAR; INTRAMUSCULAR at 02:09

## 2018-09-27 RX ADMIN — BUPIVACAINE HYDROCHLORIDE 50 MG: 2.5 INJECTION, SOLUTION EPIDURAL; INFILTRATION; INTRACAUDAL; PERINEURAL at 02:09

## 2018-09-27 RX ADMIN — LIDOCAINE HYDROCHLORIDE 50 MG: 10 INJECTION, SOLUTION EPIDURAL; INFILTRATION; INTRACAUDAL; PERINEURAL at 02:09

## 2018-09-27 NOTE — PROCEDURES
Patient had her 2nd Botox session with Dr. Nelson on 09/04/2018.  She reports no relief at this time which I told her was expected.  She has been having a severe daily headache which she cannot get rid of and is leading her to over use Fioricet as she has filled 2 prescriptions for this already in less than 1 month.  She called in asking for a 3rd prescription of Fioricet but I advised that she has already exceeded her monthly allotment of the medication and is either already experiencing medication overuse headache or is at risk for it, therefore I recommended she come in for a trigger point/nerve block procedure to rapidly improve her pain and get her over rebound.  Patient agreed.  The pain is holocephalic and associated with neck pain.  There is tenderness to multiple trigger points in the neck and there is tenderness of all the jignesh cranial nerves.  Therefore proceeded with several cervical trigger point injections and nerve blocks to treat neuralgia reactive to muscle spasm constant headache.    PROCEDURE #1      TRIGGER POINT INJECTION (CERVICAL), 3 muscles bialteral     Indication: cervical myofascial pain     Anesthesia: none     After risks and benefits were explained including bleeding, infection, worsening of the pain, damage to the area being injected, weakness, allergic reaction to medications, vascular injection, and nerve damage, signed consent was obtained. All questions were answered.     Trigger points were identified by palpation of tight muscle fibers with reproduction of patient's pain and referral pattern upon palpation. The identified areas were prepped three times with alcohol and the alcohol allowed to dry. Next, a 27 gauge 1 inch needle was placed in the anatomical area of the trigger point ot be injected. Once negative aspiration of air and heme was confirmed, I proceeded with the injection.     Medications used: bupivicaine 0.25% (18mg), lidocaine 1% (20mg) and Triamcinolone 20mg      Total volume injected: 10cc     Trigger points injected:  -- right and left semispinalis capitus   -- right and left upper trapezius  -- right and left masseter    Estimated blood loss: none     The patient did tolerate the procedure well and there were no complications.    RTC as scheduled     --------------------------------------------------------------------------------------------    PROCEDURE #2     Greater and Lesser Occipital Nerve Blocks, Bilateral     Diagnosis: occipital neuralgia, bilateral     After risks and benefits were explained including bleeding, infection, worsening of the pain, damage to the area being injected, weakness, allergic reaction to medications, vascular injection, and nerve damage, signed consent was obtained. All questions were answered.     The area of the greater occipital nerve was identified as a point 1/3rds the distance  between the occipital protuberance and the ipsilateral mastoid process. The area of the lesser occipital nerve was identified as a point 2/3rds the distance between the occipital protuberance and the ipsilateral mastoid process.The identified areas were prepped three times with alcohol and the alcohol allowed to dry. Next, a 27 gauge 1 inch needle was placed in the anatomical area of the MONA. Once reproduction of the pain was elicited and negative aspiration confirmed, I proceeded with the injection. This was repeated for the CARLYLE. The exact procedure was repeated on the contralateral side.     Adequacy of the block was confirmed by sensory examination demonstrating anesthesia in the territory of the MONA and the CARLYLE.     Medication used: Marcaine 0.25% (20mg) + lidocaine 1% (20mg) + kenalog 20mg     Total volume infused:  10cc    The patient did tolerate the procedure well and there were no complications.    ----------------------------------------------------------------------------------------------------------------    PROCEDURE #2     Supraorbital,  supratrochlear, and auriculotemporal peripheral nerve blocks, bilateral     Diagnosis: Neuralgia     After having been explained the risks and benefits of the procedure, the patient provided verbal consent . Then, the areas of the bilateral supraorbital, supratrochlear, and auriculotemporal nerves were identified by bony landmarks and the skin overlying them was was prepped with alcohol. Using a 30 gauge, 0.5inch needle, and after negative aspiration of blood, 1mL of local anesthetic was injected into each of the 3 nerves bilaterally.     Medication used: bupivacaine 0.25% (10mg) + lidocaine 1% (20mg)    Total volume infused: 6 cc     The patient tolerated the procedure well without any apparent complications.    RTC as needed    Pain score prior to procedure 8/10, pain score after procedure 2/10. Patient reported significant improvement.  I recommended she:  Next we can update Dr. Nelson on her progress.  She may need repetitive trigger points and repetitive periacranial blocks until the cycle is fully broken.

## 2018-09-28 ENCOUNTER — TELEPHONE (OUTPATIENT)
Dept: NEUROLOGY | Facility: CLINIC | Age: 73
End: 2018-09-28

## 2018-09-28 DIAGNOSIS — E03.4 HYPOTHYROIDISM DUE TO ACQUIRED ATROPHY OF THYROID: ICD-10-CM

## 2018-09-28 NOTE — PROGRESS NOTES
Refill Authorization Note     is requesting a refill authorization.    Brief assessment and rationale for refill: DEFER; needs labs/ Pharmacy request old strength  Amount/Quantity of medication ordered: 90d        Refills Authorized: Yes  If authorized number of refills: 0        Medication-related problems identified:   Non-adherence - intentional  Requires labs  Medication Therapy Plan: Labs WNL; NTBO (TSH); pt should have been out of med 2 months ago; pt was decreased to 112 mcg in 6/16 and was not put back up to 125 mcg; pharmacy requesting wrong strength, pending correct strength; pt overdue for TSH; defer to you  Name and strength of medication: LEVOTHYROXINE 125 MCG  TAB  How patient will take medication: t1t qd  Medication reconciliation completed: No        Comments:    Lab Results   Component Value Date    TSH 2.280 12/22/2016    TSH 0.687 12/19/2016    TSH 0.107 (L) 08/20/2016    H6ZGPXX 5.3 03/06/2015    FREET4 1.20 08/22/2016    FREET4 1.41 06/09/2016    FREET4 1.13 09/07/2011

## 2018-09-28 NOTE — TELEPHONE ENCOUNTER
Returned patients call. Called patient and informed her Dr. Escoto was no on maternity leave and Dr. Nelson was out of country. She is still having pain and stated the nerve block did not help much with the pain. Advised patient to go to ED if pain got too bad. Patient asked if she could come in for another never block with Dr. Nelson when she got back in town. I informed patient I would have to check and see with Dr. Nelson since the first nerve block did not help much. Patient expressed understanding.

## 2018-09-28 NOTE — TELEPHONE ENCOUNTER
----- Message from Barby Ignacio sent at 9/28/2018  1:02 PM CDT -----  Contact: [phone]  Patient is requesting a call back from the nurse stated she calling to give update, she still having pain.  Please call the patient upon request at phone number [phone].

## 2018-10-01 RX ORDER — LEVOTHYROXINE SODIUM 125 UG/1
TABLET ORAL
Qty: 30 TABLET | Refills: 0
Start: 2018-10-01

## 2018-10-01 RX ORDER — LEVOTHYROXINE SODIUM 112 UG/1
112 TABLET ORAL DAILY
Qty: 90 TABLET | Refills: 0 | Status: SHIPPED | OUTPATIENT
Start: 2018-10-01 | End: 2018-12-11 | Stop reason: SDUPTHER

## 2018-10-01 NOTE — PROGRESS NOTES
Refill Authorization Note     is requesting a refill authorization.    Brief assessment and rationale for refill: APPROVE: changing to correct strength per chart  Amount/Quantity of medication ordered: 90d        Refills Authorized: Yes  If authorized number of refills: 0        Medication-related problems identified:   Non-adherence - intentional  Requires labs  Dose adjustment  Medication Therapy Plan: Labs WNL; NTBO (TSH); pt should have been out of med 2 months ago; pt was decreased to 112 mcg in 6/16; pharmacy requesting wrong strength  Name and strength of medication: LEVOTHYROXINE 125 MCG  TAB  How patient will take medication: t1t qd  Medication reconciliation completed: No        Comments:   Lab Results   Component Value Date    TSH 2.280 12/22/2016    TSH 0.687 12/19/2016    TSH 0.107 (L) 08/20/2016    A6WIEHD 5.3 03/06/2015    FREET4 1.20 08/22/2016    FREET4 1.41 06/09/2016    FREET4 1.13 09/07/2011

## 2018-10-02 ENCOUNTER — TELEPHONE (OUTPATIENT)
Dept: NEUROLOGY | Facility: CLINIC | Age: 73
End: 2018-10-02

## 2018-10-02 NOTE — TELEPHONE ENCOUNTER
Returned patients call. Patient still having pain. Patient wanted to know if she needed to do another nerve block. I informed patient that I would ask Dr. Nelson if we should do a nerve block since it did not work last time or if we need to try something else. Stated we would call her back and that Dr. Nelson will be in clinic tomorrow. Patient expressed understanding.

## 2018-10-02 NOTE — TELEPHONE ENCOUNTER
----- Message from Renuka Arcos sent at 10/2/2018 12:16 PM CDT -----  Contact: Patient  Type: Needs Medical Advice    Who Called:  Patient  Symptoms (please be specific):  Headaches/ migraines are getting worse  How long has patient had these symptoms: na  Pharmacy name and phone #:  [facility]  Best Call Back Number:   Additional Information: Calling to speak with the Nurse. Please advise.

## 2018-10-05 ENCOUNTER — PROCEDURE VISIT (OUTPATIENT)
Dept: NEUROLOGY | Facility: CLINIC | Age: 73
End: 2018-10-05
Payer: MEDICARE

## 2018-10-05 ENCOUNTER — LAB VISIT (OUTPATIENT)
Dept: LAB | Facility: HOSPITAL | Age: 73
End: 2018-10-05
Attending: FAMILY MEDICINE
Payer: MEDICARE

## 2018-10-05 VITALS
DIASTOLIC BLOOD PRESSURE: 78 MMHG | WEIGHT: 118 LBS | HEART RATE: 88 BPM | BODY MASS INDEX: 20.14 KG/M2 | RESPIRATION RATE: 16 BRPM | HEIGHT: 64 IN | SYSTOLIC BLOOD PRESSURE: 151 MMHG

## 2018-10-05 DIAGNOSIS — E87.6 HYPOKALEMIA: ICD-10-CM

## 2018-10-05 DIAGNOSIS — M54.81 BILATERAL OCCIPITAL NEURALGIA: Primary | ICD-10-CM

## 2018-10-05 DIAGNOSIS — M79.2 NEURALGIA: ICD-10-CM

## 2018-10-05 DIAGNOSIS — E03.4 HYPOTHYROIDISM DUE TO ACQUIRED ATROPHY OF THYROID: ICD-10-CM

## 2018-10-05 LAB
ANION GAP SERPL CALC-SCNC: 12 MMOL/L
BUN SERPL-MCNC: 33 MG/DL
CALCIUM SERPL-MCNC: 9.4 MG/DL
CHLORIDE SERPL-SCNC: 97 MMOL/L
CO2 SERPL-SCNC: 31 MMOL/L
CREAT SERPL-MCNC: 1.2 MG/DL
EST. GFR  (AFRICAN AMERICAN): 51 ML/MIN/1.73 M^2
EST. GFR  (NON AFRICAN AMERICAN): 45 ML/MIN/1.73 M^2
GLUCOSE SERPL-MCNC: 78 MG/DL
POTASSIUM SERPL-SCNC: 3.6 MMOL/L
SODIUM SERPL-SCNC: 140 MMOL/L
TSH SERPL DL<=0.005 MIU/L-ACNC: 2.82 UIU/ML

## 2018-10-05 PROCEDURE — 36415 COLL VENOUS BLD VENIPUNCTURE: CPT | Mod: PO

## 2018-10-05 PROCEDURE — 64450 NJX AA&/STRD OTHER PN/BRANCH: CPT | Mod: 50,S$PBB,51, | Performed by: PSYCHIATRY & NEUROLOGY

## 2018-10-05 PROCEDURE — 64450 NJX AA&/STRD OTHER PN/BRANCH: CPT | Mod: 50,PBBFAC,PO | Performed by: PSYCHIATRY & NEUROLOGY

## 2018-10-05 PROCEDURE — 64400 NJX AA&/STRD TRIGEMINAL NRV: CPT | Mod: 50,S$PBB,, | Performed by: PSYCHIATRY & NEUROLOGY

## 2018-10-05 PROCEDURE — 80048 BASIC METABOLIC PNL TOTAL CA: CPT | Mod: PO

## 2018-10-05 PROCEDURE — 64405 NJX AA&/STRD GR OCPL NRV: CPT | Mod: 50,S$PBB,, | Performed by: PSYCHIATRY & NEUROLOGY

## 2018-10-05 PROCEDURE — 64400 NJX AA&/STRD TRIGEMINAL NRV: CPT | Mod: PBBFAC,PO,50

## 2018-10-05 PROCEDURE — 84443 ASSAY THYROID STIM HORMONE: CPT

## 2018-10-05 PROCEDURE — 64405 NJX AA&/STRD GR OCPL NRV: CPT | Mod: 50,PBBFAC,PO | Performed by: PSYCHIATRY & NEUROLOGY

## 2018-10-05 NOTE — PROCEDURES
Greater and Lesser occipital Nerve block BILATERAL  A time out was conducted just before the start of the procedure to verify the correct patient and procedure, procedure location, and all relevant critical information.   After risks and benefits were explained including bleeding, infection, worsening of the pain, damage to the area being injected, weakness, allergic reaction to medications, vascular injection, and nerve damage, and verbal consent was obtained. All questions were answered.   The area of the greater occipital nerves were identified and the skin prepped three times with alcohol and the alcohol allowed to dry. Anatomical landmark of occipital protuberance and mastoid identified and area 2cm lateral to external occipital protuberance located, next a 30 gauge 1 inch needle was placed in the area and after negative aspiration, medication was injected. Procedure repeated in the area of lesser occipital nerves which is 1/3 the distance between mastoid and external occipital protuberance injected after negative aspiration.   Distribution pattern of pain consistent with the referral pattern of trigger points identified   Focal tenderness of multiple trigger points are identified by examination   Medication used: Lidocaine 1% and Bupivacaine 0.5%.   Her pain post procedure was a 0/10 and no cutaneous allodynia present.   All questions answered and patient had no complications from procedure.   Instructed patient to stay very well hydrated with electrolyte liquids and to stay in cool environments to avoid causing triggers for today.   AURICULOTEMPORAL Block, bilateral  After having been explained the risks and benefits of the procedure, the patient provided consent . Then, the area over the auriculotemporal nerves was prepped with alcohol. Using a 30 gauge 1/2 inch needle, 1mL of local anesthetic was injected into each of the nerves bilaterally. There was negative aspiration for blood. The patient tolerated the  procedure well without any apparent complications.  The local anesthetic was bupivacaine 0.25%.  The patient tolerated the procedure well and no complications were encountered.          Yousif Nelson M.D  Medical Director, Headache and Facial Pain  [facility]

## 2018-10-26 ENCOUNTER — PROCEDURE VISIT (OUTPATIENT)
Dept: NEUROLOGY | Facility: CLINIC | Age: 73
End: 2018-10-26
Payer: MEDICARE

## 2018-10-26 VITALS
RESPIRATION RATE: 16 BRPM | HEART RATE: 74 BPM | WEIGHT: 118 LBS | SYSTOLIC BLOOD PRESSURE: 144 MMHG | HEIGHT: 64 IN | BODY MASS INDEX: 20.14 KG/M2 | DIASTOLIC BLOOD PRESSURE: 64 MMHG

## 2018-10-26 DIAGNOSIS — M54.2 CERVICALGIA: ICD-10-CM

## 2018-10-26 DIAGNOSIS — M54.81 BILATERAL OCCIPITAL NEURALGIA: Primary | ICD-10-CM

## 2018-10-26 PROCEDURE — 20553 NJX 1/MLT TRIGGER POINTS 3/>: CPT | Mod: S$PBB,,, | Performed by: PSYCHIATRY & NEUROLOGY

## 2018-10-26 PROCEDURE — 64450 NJX AA&/STRD OTHER PN/BRANCH: CPT | Mod: 50,S$PBB,59, | Performed by: PSYCHIATRY & NEUROLOGY

## 2018-10-26 PROCEDURE — 64405 NJX AA&/STRD GR OCPL NRV: CPT | Mod: 50,PBBFAC,PO | Performed by: PSYCHIATRY & NEUROLOGY

## 2018-10-26 PROCEDURE — 64405 NJX AA&/STRD GR OCPL NRV: CPT | Mod: 50,S$PBB,59, | Performed by: PSYCHIATRY & NEUROLOGY

## 2018-10-26 PROCEDURE — 20553 NJX 1/MLT TRIGGER POINTS 3/>: CPT | Mod: PBBFAC,PO | Performed by: PSYCHIATRY & NEUROLOGY

## 2018-10-26 PROCEDURE — 64450 NJX AA&/STRD OTHER PN/BRANCH: CPT | Mod: PBBFAC,PO,50,59

## 2018-10-26 RX ORDER — HYDROCODONE BITARTRATE AND ACETAMINOPHEN 5; 325 MG/1; MG/1
1 TABLET ORAL EVERY 12 HOURS PRN
Qty: 10 TABLET | Refills: 0 | Status: SHIPPED | OUTPATIENT
Start: 2018-10-26 | End: 2019-03-01

## 2018-10-26 NOTE — PROCEDURES
Greater and Lesser occipital Nerve block BILATERAL  A time out was conducted just before the start of the procedure to verify the correct patient and procedure, procedure location, and all relevant critical information.   After risks and benefits were explained including bleeding, infection, worsening of the pain, damage to the area being injected, weakness, allergic reaction to medications, vascular injection, and nerve damage, and verbal consent was obtained. All questions were answered.   The area of the greater occipital nerves were identified and the skin prepped three times with alcohol and the alcohol allowed to dry. Anatomical landmark of occipital protuberance and mastoid identified and area 2cm lateral to external occipital protuberance located, next a 30 gauge 1 inch needle was placed in the area and after negative aspiration, medication was injected. Procedure repeated in the area of lesser occipital nerves which is 1/3 the distance between mastoid and external occipital protuberance injected after negative aspiration.   Distribution pattern of pain consistent with the referral pattern of trigger points identified   Focal tenderness of multiple trigger points are identified by examination   Medication used: Lidocaine 1% and Bupivacaine 0.5%.   Her pain post procedure was a 0/10 and no cutaneous allodynia present.   All questions answered and patient had no complications from procedure.   Instructed patient to stay very well hydrated with electrolyte liquids and to stay in cool environments to avoid causing triggers for today.     TRIGGER POINT INJECTION (CERVICAL)     Indication: cervicalgia     Anesthesia: none     After risks and benefits were explained including bleeding, infection, worsening of the pain, damage to the area being injected, weakness, allergic reaction to medications, vascular injection, and nerve damage, signed consent was obtained. All questions were answered.     Trigger points were  identified by palpation of tight muscle fibers with reproduction of patient's pain and referral pattern upon palpation. The identified areas were prepped three times with alcohol and the alcohol allowed to dry. Next, a 27 gauge 1 inch needle was placed in the anatomical area of the trigger point ot be injected. Once negative aspiration of air and heme was confirmed, I proceeded with the injection.     Medications used: bupivicaine 0.25% and Triamcinolone 40mg     Total volume infused:     Trigger points injected:  -- right and left semispinalis capitus   -- right and left upper trapezius  --right splenius capitis  Estimated blood loss: none     The patient did tolerate the procedure well and there were no complications.    RTC           Yousif Nelson M.D  Medical Director, Headache and Facial Pain  [facility]

## 2018-11-16 RX ORDER — GABAPENTIN 300 MG/1
CAPSULE ORAL
Qty: 90 CAPSULE | Refills: 11 | Status: SHIPPED | OUTPATIENT
Start: 2018-11-16 | End: 2019-02-20 | Stop reason: SDUPTHER

## 2018-11-20 ENCOUNTER — TELEPHONE (OUTPATIENT)
Dept: PHARMACY | Facility: CLINIC | Age: 73
End: 2018-11-20

## 2018-11-20 ENCOUNTER — PROCEDURE VISIT (OUTPATIENT)
Dept: NEUROLOGY | Facility: CLINIC | Age: 73
End: 2018-11-20
Payer: MEDICARE

## 2018-11-20 VITALS
DIASTOLIC BLOOD PRESSURE: 83 MMHG | RESPIRATION RATE: 16 BRPM | HEIGHT: 64 IN | HEART RATE: 112 BPM | SYSTOLIC BLOOD PRESSURE: 159 MMHG | WEIGHT: 118 LBS | BODY MASS INDEX: 20.14 KG/M2

## 2018-11-20 DIAGNOSIS — M62.838 CERVICAL PARASPINAL MUSCLE SPASM: ICD-10-CM

## 2018-11-20 PROCEDURE — 64405 NJX AA&/STRD GR OCPL NRV: CPT | Mod: 50,59,HCWC,S$GLB | Performed by: PSYCHIATRY & NEUROLOGY

## 2018-11-20 PROCEDURE — 64450 NJX AA&/STRD OTHER PN/BRANCH: CPT | Mod: 50,59,HCWC,S$GLB | Performed by: PSYCHIATRY & NEUROLOGY

## 2018-11-20 PROCEDURE — 20553 NJX 1/MLT TRIGGER POINTS 3/>: CPT | Mod: HCWC,S$GLB,, | Performed by: PSYCHIATRY & NEUROLOGY

## 2018-11-20 NOTE — PROCEDURES
Greater and Lesser occipital Nerve block BILATERAL  A time out was conducted just before the start of the procedure to verify the correct patient and procedure, procedure location, and all relevant critical information.   After risks and benefits were explained including bleeding, infection, worsening of the pain, damage to the area being injected, weakness, allergic reaction to medications, vascular injection, and nerve damage, and verbal consent was obtained. All questions were answered.   The area of the greater occipital nerves were identified and the skin prepped three times with alcohol and the alcohol allowed to dry. Anatomical landmark of occipital protuberance and mastoid identified and area 2cm lateral to external occipital protuberance located, next a 30 gauge 1 inch needle was placed in the area and after negative aspiration, medication was injected. Procedure repeated in the area of lesser occipital nerves which is 1/3 the distance between mastoid and external occipital protuberance injected after negative aspiration.   Distribution pattern of pain consistent with the referral pattern of trigger points identified   Focal tenderness of multiple trigger points are identified by examination   Medication used: Lidocaine 1% and Bupivacaine 0.5%.   Her pain post procedure was a 0/10 and no cutaneous allodynia present.   All questions answered and patient had no complications from procedure.   Instructed patient to stay very well hydrated with electrolyte liquids and to stay in cool environments to avoid causing triggers for today.     TRIGGER POINT INJECTION (CERVICAL)     Indication: cervicalgia     Anesthesia: none     After risks and benefits were explained including bleeding, infection, worsening of the pain, damage to the area being injected, weakness, allergic reaction to medications, vascular injection, and nerve damage, signed consent was obtained. All questions were answered.     Trigger points were  identified by palpation of tight muscle fibers with reproduction of patient's pain and referral pattern upon palpation. The identified areas were prepped three times with alcohol and the alcohol allowed to dry. Next, a 27 gauge 1 inch needle was placed in the anatomical area of the trigger point ot be injected. Once negative aspiration of air and heme was confirmed, I proceeded with the injection.     Medications used: bupivicaine 0.25% and Triamcinolone 40mg     Total volume infused:     Trigger points injected:  -- right and left semispinalis capitus   -- right and left upper trapezius  --right splenius capitis  Estimated blood loss: none     The patient did tolerate the procedure well and there were no complications.    RTC           Yousif Nelson M.D  Medical Director, Headache and Facial Pain  [facility]

## 2018-11-23 NOTE — TELEPHONE ENCOUNTER
DOCUMENTATION ONLY:  Prior Authorization for [employer] approved from 11/23/18 to 12/31/19    Co-pay: $400.64    Patient Assistance IS required and is being researched.     -ARR

## 2018-11-26 RX ORDER — PROCHLORPERAZINE 25 MG
SUPPOSITORY, RECTAL RECTAL
Qty: 12 SUPPOSITORY | Refills: 3 | Status: ON HOLD | OUTPATIENT
Start: 2018-11-26 | End: 2019-04-19 | Stop reason: HOSPADM

## 2018-11-27 ENCOUNTER — CLINICAL SUPPORT (OUTPATIENT)
Dept: CARDIOLOGY | Facility: CLINIC | Age: 73
End: 2018-11-27
Attending: INTERNAL MEDICINE
Payer: MEDICARE

## 2018-11-27 ENCOUNTER — PROCEDURE VISIT (OUTPATIENT)
Dept: NEUROLOGY | Facility: CLINIC | Age: 73
End: 2018-11-27
Payer: MEDICARE

## 2018-11-27 VITALS
DIASTOLIC BLOOD PRESSURE: 70 MMHG | RESPIRATION RATE: 16 BRPM | HEART RATE: 84 BPM | SYSTOLIC BLOOD PRESSURE: 151 MMHG | WEIGHT: 118 LBS | BODY MASS INDEX: 20.14 KG/M2 | HEIGHT: 64 IN

## 2018-11-27 DIAGNOSIS — I49.5 SSS (SICK SINUS SYNDROME): ICD-10-CM

## 2018-11-27 DIAGNOSIS — G43.719 INTRACTABLE CHRONIC MIGRAINE WITHOUT AURA AND WITHOUT STATUS MIGRAINOSUS: Primary | ICD-10-CM

## 2018-11-27 DIAGNOSIS — Z95.0 CARDIAC PACEMAKER IN SITU: ICD-10-CM

## 2018-11-27 PROCEDURE — 64615 CHEMODENERV MUSC MIGRAINE: CPT | Mod: HCWC,S$GLB,, | Performed by: PSYCHIATRY & NEUROLOGY

## 2018-11-27 PROCEDURE — 93280 PM DEVICE PROGR EVAL DUAL: CPT | Mod: HCWC,S$GLB,, | Performed by: INTERNAL MEDICINE

## 2018-11-27 NOTE — PROCEDURES
Procedures   BOTOX PROCEDURE    PROCEDURE PERFORMED: Botulinum toxin injection (62466)    CLINICAL INDICATION: G43.719  NDC: 2058-4565-01    A time out was conducted just before the start of the procedure to verify the correct patient and procedure, procedure location, and all relevant critical information.       This is the first Botox injections and I am aiming for at least 50%  improvement in the patient's symptoms. Frequency of treatment is every 3 months unless no response to the treatments, at which time we will discontinue the injections.     DESCRIPTION OF PROCEDURE: After obtaining informed consent and under aseptic technique, a total of 155 units of botulinum toxin type A were injected in the following muscles: Procerus 5 units,  5 units bilaterally, frontalis 20 units, temporalis 20 units bilaterally, occipitalis 15 units, upper cervical paraspinals 10 units bilaterally and trapezius 15 units bilaterally. The patient was given a total of 155 units in 31 sites.The patient tolerated the procedure well. There were no complications. The patient was given a prescription for repeat treatment in 3 months.     Unavoidable waste 45 units          Yousif Nelson M.D  Medical Director, Headache and Facial Pain  [facility]

## 2018-11-28 LAB
AV DELAY - LONGEST: 410 MSEC
AV DELAY - SHORTEST: 300 MSEC
IMPEDANCE RA LEAD (NATIVE): 585 OHMS
IMPEDANCE RA LEAD: 487 OHMS
OHS CV DC PP MS1: 0.4 MS
OHS CV DC PP MS2: 0.4 MS
OHS CV DC PP V1: 2 V
OHS CV DC PP V2: 2.4 V
P/R-WAVE RA LEAD (NATIVE): NORMAL MV
P/R-WAVE RA LEAD: NORMAL MV
PV DELAY - LONGEST: 390 MSEC
PV DELAY - SHORTEST: 280 MSEC
THRESHOLD MS RA LEAD (NATIVE): 0.4 MS
THRESHOLD MS RA LEAD: 0.4 MS
THRESHOLD V RA LEAD (NATIVE): 1.2 V
THRESHOLD V RA LEAD: 0.8 V

## 2018-12-11 DIAGNOSIS — E03.4 HYPOTHYROIDISM DUE TO ACQUIRED ATROPHY OF THYROID: ICD-10-CM

## 2018-12-11 NOTE — PROGRESS NOTES
Refill Authorization Note     is requesting a refill authorization.    Brief assessment and rationale for refill: APPROVE: prr  Amount/Quantity of medication ordered: 90d        Refills Authorized: Yes  If authorized number of refills: 0           Medication Therapy Plan: TSH WNL; pt due for annual in 3/19; labs updated 10/18; approve 3 more  Name and strength of medication: LEVOTHYROXINE 112 MCG  TAB  How patient will take medication: t1t qd  Medication reconciliation completed: No        Comments:   Lab Results   Component Value Date    TSH 2.823 10/05/2018    TSH 2.280 12/22/2016    TSH 0.687 12/19/2016    G4TOHLI 5.3 03/06/2015    FREET4 1.20 08/22/2016    FREET4 1.41 06/09/2016    FREET4 1.13 09/07/2011

## 2018-12-12 RX ORDER — LEVOTHYROXINE SODIUM 112 UG/1
112 TABLET ORAL DAILY
Qty: 90 TABLET | Refills: 0 | Status: SHIPPED | OUTPATIENT
Start: 2018-12-12 | End: 2019-02-14 | Stop reason: SDUPTHER

## 2018-12-20 ENCOUNTER — LAB VISIT (OUTPATIENT)
Dept: LAB | Facility: HOSPITAL | Age: 73
End: 2018-12-20
Attending: INTERNAL MEDICINE
Payer: MEDICARE

## 2018-12-20 ENCOUNTER — OFFICE VISIT (OUTPATIENT)
Dept: HEMATOLOGY/ONCOLOGY | Facility: CLINIC | Age: 73
End: 2018-12-20
Payer: MEDICARE

## 2018-12-20 VITALS
SYSTOLIC BLOOD PRESSURE: 142 MMHG | RESPIRATION RATE: 18 BRPM | WEIGHT: 119.94 LBS | TEMPERATURE: 99 F | BODY MASS INDEX: 19.98 KG/M2 | HEART RATE: 88 BPM | HEIGHT: 65 IN | DIASTOLIC BLOOD PRESSURE: 68 MMHG

## 2018-12-20 DIAGNOSIS — E87.6 HYPOKALEMIA: ICD-10-CM

## 2018-12-20 DIAGNOSIS — D50.0 IRON DEFICIENCY ANEMIA DUE TO CHRONIC BLOOD LOSS: ICD-10-CM

## 2018-12-20 DIAGNOSIS — Z98.84 GASTRIC BYPASS STATUS FOR OBESITY: ICD-10-CM

## 2018-12-20 DIAGNOSIS — J84.10 CALCIFIED GRANULOMA OF LUNG: ICD-10-CM

## 2018-12-20 DIAGNOSIS — I10 ESSENTIAL HYPERTENSION: ICD-10-CM

## 2018-12-20 DIAGNOSIS — I27.20 PULMONARY HYPERTENSION: ICD-10-CM

## 2018-12-20 DIAGNOSIS — D50.0 IRON DEFICIENCY ANEMIA DUE TO CHRONIC BLOOD LOSS: Primary | ICD-10-CM

## 2018-12-20 DIAGNOSIS — I25.10 CORONARY ARTERY DISEASE INVOLVING NATIVE CORONARY ARTERY OF NATIVE HEART WITHOUT ANGINA PECTORIS: ICD-10-CM

## 2018-12-20 DIAGNOSIS — E53.8 B12 DEFICIENCY: ICD-10-CM

## 2018-12-20 LAB
ALBUMIN SERPL BCP-MCNC: 4.5 G/DL
ALP SERPL-CCNC: 87 U/L
ALT SERPL W/O P-5'-P-CCNC: 21 U/L
ANION GAP SERPL CALC-SCNC: 10 MMOL/L
AST SERPL-CCNC: 33 U/L
BASOPHILS # BLD AUTO: 0.05 K/UL
BASOPHILS NFR BLD: 0.7 %
BILIRUB SERPL-MCNC: 0.4 MG/DL
BUN SERPL-MCNC: 33 MG/DL
CALCIUM SERPL-MCNC: 9.2 MG/DL
CHLORIDE SERPL-SCNC: 89 MMOL/L
CO2 SERPL-SCNC: 38 MMOL/L
CREAT SERPL-MCNC: 1.35 MG/DL
DIFFERENTIAL METHOD: ABNORMAL
EOSINOPHIL # BLD AUTO: 0.1 K/UL
EOSINOPHIL NFR BLD: 1.9 %
ERYTHROCYTE [DISTWIDTH] IN BLOOD BY AUTOMATED COUNT: 11.6 %
EST. GFR  (AFRICAN AMERICAN): 45 ML/MIN/1.73 M^2
EST. GFR  (NON AFRICAN AMERICAN): 39 ML/MIN/1.73 M^2
GLUCOSE SERPL-MCNC: 96 MG/DL
HCT VFR BLD AUTO: 38.4 %
HGB BLD-MCNC: 12.5 G/DL
LYMPHOCYTES # BLD AUTO: 1.1 K/UL
LYMPHOCYTES NFR BLD: 16.9 %
MCH RBC QN AUTO: 29.3 PG
MCHC RBC AUTO-ENTMCNC: 32.6 G/DL
MCV RBC AUTO: 90 FL
MONOCYTES # BLD AUTO: 0.7 K/UL
MONOCYTES NFR BLD: 10.9 %
NEUTROPHILS # BLD AUTO: 4.6 K/UL
NEUTROPHILS NFR BLD: 69.6 %
NRBC BLD-RTO: 0 /100 WBC
PLATELET # BLD AUTO: 225 K/UL
PMV BLD AUTO: 10.6 FL
POTASSIUM SERPL-SCNC: 3.3 MMOL/L
PROT SERPL-MCNC: 7.7 G/DL
RBC # BLD AUTO: 4.26 M/UL
SODIUM SERPL-SCNC: 137 MMOL/L
WBC # BLD AUTO: 6.67 K/UL

## 2018-12-20 PROCEDURE — 99214 OFFICE O/P EST MOD 30 MIN: CPT | Mod: S$GLB,,, | Performed by: INTERNAL MEDICINE

## 2018-12-20 PROCEDURE — 99999 PR PBB SHADOW E&M-EST. PATIENT-LVL V: CPT | Mod: PBBFAC,,, | Performed by: INTERNAL MEDICINE

## 2018-12-20 PROCEDURE — 1101F PT FALLS ASSESS-DOCD LE1/YR: CPT | Mod: CPTII,S$GLB,, | Performed by: INTERNAL MEDICINE

## 2018-12-20 PROCEDURE — 3078F DIAST BP <80 MM HG: CPT | Mod: CPTII,S$GLB,, | Performed by: INTERNAL MEDICINE

## 2018-12-20 PROCEDURE — 85025 COMPLETE CBC W/AUTO DIFF WBC: CPT | Mod: PN

## 2018-12-20 PROCEDURE — 80053 COMPREHEN METABOLIC PANEL: CPT | Mod: PN

## 2018-12-20 PROCEDURE — 80053 COMPREHEN METABOLIC PANEL: CPT

## 2018-12-20 PROCEDURE — 85025 COMPLETE CBC W/AUTO DIFF WBC: CPT

## 2018-12-20 PROCEDURE — 99499 UNLISTED E&M SERVICE: CPT | Mod: S$GLB,,, | Performed by: INTERNAL MEDICINE

## 2018-12-20 PROCEDURE — 36415 COLL VENOUS BLD VENIPUNCTURE: CPT | Mod: PN

## 2018-12-20 PROCEDURE — 3077F SYST BP >= 140 MM HG: CPT | Mod: CPTII,S$GLB,, | Performed by: INTERNAL MEDICINE

## 2018-12-20 RX ORDER — FERROUS SULFATE 325(65) MG
325 TABLET ORAL DAILY
Qty: 30 TABLET | Refills: 3 | Status: SHIPPED | OUTPATIENT
Start: 2018-12-20 | End: 2019-01-08 | Stop reason: SDUPTHER

## 2018-12-20 RX ORDER — FERROUS SULFATE 325(65) MG
325 TABLET ORAL DAILY
Qty: 30 TABLET | Refills: 3 | Status: SHIPPED | OUTPATIENT
Start: 2018-12-20 | End: 2019-03-01

## 2018-12-21 PROBLEM — J84.10 CALCIFIED GRANULOMA OF LUNG: Status: ACTIVE | Noted: 2018-12-21

## 2018-12-22 NOTE — PROGRESS NOTES
Subjective:       Patient ID: Dulce Castro is a 73 y.o. female.    Chief Complain  HPI:   A 73-year-old  woman, very well known to me. The patient has pulmonary  hypertension, coronary artery disease, anemia, neutropenia and had a history of  gastric bypass and bipolar disorder. She has been following clinically. . She follows with   pulmonologist, and she follows with Cardiology as well. Not drinking, no other drugs.     chronic headaches , seeing headache specialist  dR james, had lost weight , takes lasix, takes K states she feels weak and wobbly occ, gets botox for migraines, recently she started taking protein shakes to improve her weight and it has helped some comes in follow-up without any specific complaints  REVIEW OF SYSTEMS:   Has pacemaker  No CP occ palpitations, feels SOB on minial activity from pulmonary HTN, c/o of headaches, has an appt with a headache specialist   denies abd pain, nausea, vomiting, cough or sputum production   occ feet swelling  Not had a capsule study  PHYSICAL EXAM:     Wt Readings from Last 3 Encounters:   12/20/18 54.4 kg (119 lb 14.9 oz)   11/27/18 53.5 kg (118 lb)   11/20/18 53.5 kg (118 lb)     Temp Readings from Last 3 Encounters:   12/20/18 99.1 °F (37.3 °C)   07/20/18 99.2 °F (37.3 °C) (Oral)   03/01/18 98.1 °F (36.7 °C) (Oral)     BP Readings from Last 3 Encounters:   12/20/18 (!) 142/68   11/27/18 (!) 151/70   11/20/18 (!) 159/83     Pulse Readings from Last 3 Encounters:   12/20/18 88   11/27/18 84   11/20/18 (!) 112     GENERAL: Comfortable looking patient. Patient is in no distress.  Awake, alert and oriented to time, person and place.  No anxiety, or agitation.  Frail appearance    HEENT: Normal conjunctivae and eyelids. WNL.  PERRLA 3 to 4 mm. No icterus, no pallor, no congestion, and no discharge noted.     NECK:  Supple. Trachea is central.  No crepitus.  No JVD or masses.    RESPIRATORY:  No intercostal retractions.  No dullness to  percussion.  Chest is clear to auscultation.  No rales, rhonchi or wheezes.  No crepitus.  Good air entry bilaterally.    CARDIOVASCULAR:  S1 and S2 are normally heard without murmurs or gallops.  All peripheral pulses are present.    ABDOMEN:  Normal abdomen.  No hepatosplenomegaly.  No free fluid.  Bowel sounds are present.  No hernia noted. No masses.  No rebound or tenderness.  No guarding or rigidity.  Umbilicus is midline.    LYMPHATICS:  No axillary, cervical, supraclavicular, submental, or inguinal lymphadenopathy.    SKIN/MUSCULOSKELETAL:  There is no evidence of excoriation marks or ecchmosis.  No rashes.  No cyanosis.  No clubbing.  No joint or skeletal deformities noted.  Normal range of motion.    NEUROLOGIC:  Higher functions are appropriate.  No cranial nerve deficits.  Normal sebastián.  Normal strength.  Motor and sensory functions are normal.  Deep tendon reflexes are normal.    GENITAL/RECTAL:  Exams are deferred.      Laboratory:     CBC:  Lab Results   Component Value Date    WBC 6.67 12/20/2018    RBC 4.26 12/20/2018    HGB 12.5 12/20/2018    HCT 38.4 12/20/2018    MCV 90 12/20/2018    MCH 29.3 12/20/2018    MCHC 32.6 12/20/2018    RDW 11.6 12/20/2018     12/20/2018    MPV 10.6 12/20/2018    GRAN 4.6 12/20/2018    GRAN 69.6 12/20/2018    LYMPH 1.1 12/20/2018    LYMPH 16.9 (L) 12/20/2018    MONO 0.7 12/20/2018    MONO 10.9 12/20/2018    EOS 0.1 12/20/2018    BASO 0.05 12/20/2018    EOSINOPHIL 1.9 12/20/2018    BASOPHIL 0.7 12/20/2018       BMP: BMP  Lab Results   Component Value Date     12/20/2018    K 3.3 (L) 12/20/2018    CL 89 (L) 12/20/2018    CO2 38 (H) 12/20/2018    BUN 33 (H) 12/20/2018    CREATININE 1.35 12/20/2018    CALCIUM 9.2 12/20/2018    ANIONGAP 10 12/20/2018    ESTGFRAFRICA 45 (A) 12/20/2018    EGFRNONAA 39 (A) 12/20/2018       LFT:   Lab Results   Component Value Date    ALT 21 12/20/2018    AST 33 12/20/2018    ALKPHOS 87 12/20/2018    BILITOT 0.4 12/20/2018     Lab  Results   Component Value Date    XIEYWFSV08 >1000 (H) 07/13/2018     Haptoglobin was low  Assessment/Plan:   hgb has stabilized, may have had a component of hemolysis.  But this has stabilized  Vish neg haptoglobin has normalsied .   Cbc, cmp   hbg stable   K is low, she takes zaroxylin and lasix stat k now and we will  Phrev. She needs to notify her pcp about recurrent hypokalemia she has forgotten to take her potassium at home   rtc 4 months with cbc, cmp,    bipolar dz: well controlled, chronic hradaches? Hypoxic, vascular, seeing headache specialist  Tegretol has been given by Dr Ness Haji for bipolar dz.  Cont f/u jonn DR Nelson headache speciaity   patient has calcified granulomas as well as pulmonary  hypertension for which she follows with pulmonary these granulomas have been stable for a while she has on and off scans and radiological studies for the same

## 2019-01-07 NOTE — TELEPHONE ENCOUNTER
Last visit with authorizing provider: Erik Jacobsen MD:  3/8/2018     Next visit with authorizing provider: Erik Jacobsen MD:  Visit date not found    Last e-scripted to Peter  on 08/19/2018 for 4 month supply

## 2019-01-08 ENCOUNTER — TELEPHONE (OUTPATIENT)
Dept: FAMILY MEDICINE | Facility: CLINIC | Age: 74
End: 2019-01-08

## 2019-01-08 DIAGNOSIS — N18.30 STAGE 3 CHRONIC KIDNEY DISEASE: Primary | ICD-10-CM

## 2019-01-08 RX ORDER — TIZANIDINE 4 MG/1
4 TABLET ORAL EVERY 8 HOURS PRN
Qty: 60 TABLET | Refills: 1 | Status: SHIPPED | OUTPATIENT
Start: 2019-01-08 | End: 2019-03-07 | Stop reason: SDUPTHER

## 2019-01-08 NOTE — TELEPHONE ENCOUNTER
Pt calling to check status of this refill.  Notified that was still waiting for approval/denial.  Instructed on 72 business hour policy. Verbalized understanding.

## 2019-01-08 NOTE — TELEPHONE ENCOUNTER
Pt states that hemonc did labs and noted her kidney function was low.  States they told her to see nephrologist. Wants to remain here in [facility], with [facility].  Referral to nephrology pended. She has no preference to MD, only to first available.

## 2019-01-08 NOTE — TELEPHONE ENCOUNTER
----- Message from Susana Mena sent at 1/8/2019  9:13 AM CST -----  Contact: Pt  Type: Needs Medical Advice    Who Called:  Pt  Best Call Back Number: [phone] (home)   Additional Information: calling in regards to a getting a recommendation. thanks

## 2019-01-09 ENCOUNTER — LAB VISIT (OUTPATIENT)
Dept: LAB | Facility: HOSPITAL | Age: 74
End: 2019-01-09
Attending: INTERNAL MEDICINE
Payer: MEDICARE

## 2019-01-09 ENCOUNTER — OFFICE VISIT (OUTPATIENT)
Dept: NEPHROLOGY | Facility: CLINIC | Age: 74
End: 2019-01-09
Payer: MEDICARE

## 2019-01-09 VITALS
HEIGHT: 64 IN | HEART RATE: 79 BPM | DIASTOLIC BLOOD PRESSURE: 58 MMHG | OXYGEN SATURATION: 99 % | WEIGHT: 121.31 LBS | BODY MASS INDEX: 20.71 KG/M2 | SYSTOLIC BLOOD PRESSURE: 126 MMHG

## 2019-01-09 DIAGNOSIS — N18.30 CKD (CHRONIC KIDNEY DISEASE) STAGE 3, GFR 30-59 ML/MIN: Primary | ICD-10-CM

## 2019-01-09 DIAGNOSIS — Z99.81 HYPOXEMIA REQUIRING SUPPLEMENTAL OXYGEN: ICD-10-CM

## 2019-01-09 DIAGNOSIS — Z98.890 S/P MVR (MITRAL VALVE REPAIR): ICD-10-CM

## 2019-01-09 DIAGNOSIS — Z79.01 ANTICOAGULATED ON COUMADIN: ICD-10-CM

## 2019-01-09 DIAGNOSIS — N39.0 UTI (URINARY TRACT INFECTION), UNCOMPLICATED: ICD-10-CM

## 2019-01-09 DIAGNOSIS — N17.9 ACUTE KIDNEY INJURY: ICD-10-CM

## 2019-01-09 DIAGNOSIS — E55.9 VITAMIN D DEFICIENCY: ICD-10-CM

## 2019-01-09 DIAGNOSIS — D50.8 OTHER IRON DEFICIENCY ANEMIA: ICD-10-CM

## 2019-01-09 DIAGNOSIS — I27.20 PULMONARY HYPERTENSION: ICD-10-CM

## 2019-01-09 DIAGNOSIS — I25.10 CORONARY ARTERY DISEASE INVOLVING NATIVE CORONARY ARTERY OF NATIVE HEART WITHOUT ANGINA PECTORIS: ICD-10-CM

## 2019-01-09 DIAGNOSIS — R09.02 HYPOXEMIA REQUIRING SUPPLEMENTAL OXYGEN: ICD-10-CM

## 2019-01-09 DIAGNOSIS — Z98.84 GASTRIC BYPASS STATUS FOR OBESITY: ICD-10-CM

## 2019-01-09 LAB
BACTERIA #/AREA URNS HPF: NORMAL /HPF
BILIRUB UR QL STRIP: NEGATIVE
CLARITY UR: CLEAR
COLOR UR: YELLOW
CREAT UR-MCNC: 19 MG/DL
GLUCOSE UR QL STRIP: NEGATIVE
HGB UR QL STRIP: NEGATIVE
KETONES UR QL STRIP: NEGATIVE
LEUKOCYTE ESTERASE UR QL STRIP: NEGATIVE
MICROSCOPIC COMMENT: NORMAL
NITRITE UR QL STRIP: NEGATIVE
PH UR STRIP: 6 [PH] (ref 5–8)
PROT UR QL STRIP: NEGATIVE
PROT UR-MCNC: <7 MG/DL
PROT/CREAT UR: NORMAL MG/G{CREAT}
SP GR UR STRIP: 1.01 (ref 1–1.03)
URN SPEC COLLECT METH UR: NORMAL
WBC #/AREA URNS HPF: 1 /HPF (ref 0–5)

## 2019-01-09 PROCEDURE — 99204 PR OFFICE/OUTPT VISIT, NEW, LEVL IV, 45-59 MIN: ICD-10-PCS | Mod: S$GLB,,, | Performed by: INTERNAL MEDICINE

## 2019-01-09 PROCEDURE — 3078F PR MOST RECENT DIASTOLIC BLOOD PRESSURE < 80 MM HG: ICD-10-PCS | Mod: CPTII,S$GLB,, | Performed by: INTERNAL MEDICINE

## 2019-01-09 PROCEDURE — 99999 PR PBB SHADOW E&M-EST. PATIENT-LVL III: CPT | Mod: PBBFAC,,, | Performed by: INTERNAL MEDICINE

## 2019-01-09 PROCEDURE — 82570 ASSAY OF URINE CREATININE: CPT

## 2019-01-09 PROCEDURE — 3074F SYST BP LT 130 MM HG: CPT | Mod: CPTII,S$GLB,, | Performed by: INTERNAL MEDICINE

## 2019-01-09 PROCEDURE — 99999 PR PBB SHADOW E&M-EST. PATIENT-LVL III: ICD-10-PCS | Mod: PBBFAC,,, | Performed by: INTERNAL MEDICINE

## 2019-01-09 PROCEDURE — 87186 SC STD MICRODIL/AGAR DIL: CPT

## 2019-01-09 PROCEDURE — 99204 OFFICE O/P NEW MOD 45 MIN: CPT | Mod: S$GLB,,, | Performed by: INTERNAL MEDICINE

## 2019-01-09 PROCEDURE — 87088 URINE BACTERIA CULTURE: CPT

## 2019-01-09 PROCEDURE — 87077 CULTURE AEROBIC IDENTIFY: CPT

## 2019-01-09 PROCEDURE — 3078F DIAST BP <80 MM HG: CPT | Mod: CPTII,S$GLB,, | Performed by: INTERNAL MEDICINE

## 2019-01-09 PROCEDURE — 81000 URINALYSIS NONAUTO W/SCOPE: CPT | Mod: PO

## 2019-01-09 PROCEDURE — 87086 URINE CULTURE/COLONY COUNT: CPT

## 2019-01-09 PROCEDURE — 99499 UNLISTED E&M SERVICE: CPT | Mod: S$GLB,,, | Performed by: INTERNAL MEDICINE

## 2019-01-09 PROCEDURE — 3074F PR MOST RECENT SYSTOLIC BLOOD PRESSURE < 130 MM HG: ICD-10-PCS | Mod: CPTII,S$GLB,, | Performed by: INTERNAL MEDICINE

## 2019-01-09 PROCEDURE — 99499 RISK ADDL DX/OHS AUDIT: ICD-10-PCS | Mod: S$GLB,,, | Performed by: INTERNAL MEDICINE

## 2019-01-09 PROCEDURE — 1101F PT FALLS ASSESS-DOCD LE1/YR: CPT | Mod: CPTII,S$GLB,, | Performed by: INTERNAL MEDICINE

## 2019-01-09 PROCEDURE — 1101F PR PT FALLS ASSESS DOC 0-1 FALLS W/OUT INJ PAST YR: ICD-10-PCS | Mod: CPTII,S$GLB,, | Performed by: INTERNAL MEDICINE

## 2019-01-09 NOTE — TELEPHONE ENCOUNTER
----- Message from Ashleigh Lopez sent at 1/8/2019  4:14 PM CST -----  Contact: [facility]  Call placed to POD    Patient need refill on following medication tiZANidine (ZANAFLEX) 4 MG tablet       Please call to advice [phone] (home)         [facility] - [address] [address]  Phone: [phone] Fax: [phone]

## 2019-01-09 NOTE — TELEPHONE ENCOUNTER
----- Message from Letty Gongora sent at 1/9/2019  9:13 AM CST -----  Contact: Dulce  Type:  Patient Returning Call    Who Called:  patient  Who Left Message for Patient:  Tommy  Does the patient know what this is regarding?:  Not sure  Best Call Back Number:  [phone]  Additional Information:  Na

## 2019-01-09 NOTE — LETTER
January 20, 2019      Erik Jacobsen MD  [address] [facility] [address] [place] [address]           [facility]  [address] [facility] [address] [place] [address]  Phone: [phone]          Patient: Dulce Castro   MR Number: [id number]   YOB: 1945   Date of Visit: 1/9/2019       Dear Dr. Erik Jacobsen:    Thank you for referring Dulce Castro to me for evaluation. Attached you will find relevant portions of my assessment and plan of care.    If you have questions, please do not hesitate to call me. I look forward to following Dulce Castro along with you.    Sincerely,    Shahram Levy MD    Enclosure  CC:  No Recipients    If you would like to receive this communication electronically, please contact [email or web address] or [phone] to request more information on [employer] access.    For providers and/or their staff who would like to refer a patient to [facility], please contact us through our one-stop-shop provider referral line, [facility], at [phone].    If you feel you have received this communication in error or would no longer like to receive these types of communications, please e-mail [email or web address]

## 2019-01-10 ENCOUNTER — HOSPITAL ENCOUNTER (OUTPATIENT)
Dept: RADIOLOGY | Facility: HOSPITAL | Age: 74
Discharge: HOME | End: 2019-01-10
Attending: INTERNAL MEDICINE
Payer: MEDICARE

## 2019-01-10 DIAGNOSIS — N18.30 CKD (CHRONIC KIDNEY DISEASE) STAGE 3, GFR 30-59 ML/MIN: ICD-10-CM

## 2019-01-10 DIAGNOSIS — N39.0 UTI (URINARY TRACT INFECTION), UNCOMPLICATED: ICD-10-CM

## 2019-01-10 PROCEDURE — 76770 US EXAM ABDO BACK WALL COMP: CPT | Mod: TC,PO

## 2019-01-10 PROCEDURE — 76770 US EXAM ABDO BACK WALL COMP: CPT | Mod: 26,,, | Performed by: RADIOLOGY

## 2019-01-10 PROCEDURE — 76770 US RETROPERITONEAL COMPLETE: ICD-10-PCS | Mod: 26,,, | Performed by: RADIOLOGY

## 2019-01-12 LAB — BACTERIA UR CULT: NORMAL

## 2019-01-14 ENCOUNTER — TELEPHONE (OUTPATIENT)
Dept: NEPHROLOGY | Facility: CLINIC | Age: 74
End: 2019-01-14

## 2019-01-14 RX ORDER — CEFDINIR 300 MG/1
300 CAPSULE ORAL 2 TIMES DAILY
Qty: 6 CAPSULE | Refills: 0 | Status: SHIPPED | OUTPATIENT
Start: 2019-01-14 | End: 2019-01-17

## 2019-01-14 NOTE — TELEPHONE ENCOUNTER
Please inform pt that her urcx indicates a UTI, E.coli and I have sent ABX, cefdinir, to [facility] to take for 3 days. Thank you

## 2019-01-18 ENCOUNTER — TELEPHONE (OUTPATIENT)
Dept: PHARMACY | Facility: CLINIC | Age: 74
End: 2019-01-18

## 2019-01-18 NOTE — TELEPHONE ENCOUNTER
FOR DOCUMENTATION ONLY:    Financial Assistance for [employer] approved from 12/19/2018 to 12/18/2019    Source:  [email or web address]    ID: [id number]  BIN: [id number]  PCN: [id number]  GRP: [id number]    Amount: $4,000

## 2019-01-20 PROBLEM — N18.30 CKD (CHRONIC KIDNEY DISEASE) STAGE 3, GFR 30-59 ML/MIN: Status: ACTIVE | Noted: 2019-01-20

## 2019-01-20 NOTE — PROGRESS NOTES
Subjective:       Patient ID: Dulce Castro is a 73 y.o. White female who presents for new evaluation of Chronic Kidney Disease    HPI     She is referred by her PCP for CKD with baseline creatinine ranging 1-1.3mgdL since 2014. She has sustained several ARIEL episodes in the past.    She denies foamy urine, no hematuria and no flank pain. She feels like she constantly has a UTI. She is also s/p bladder pacemaker 7-9 years ago by Dr. Padilla.  She follows a low sodium diet and feels she stays hydrated. No LE edema and chronic SOB. When she develops volume overload she has facial edema. No NSAID use and no herbal medications. She was premature. No known family history of kidney disease.     She is  and is a retired      Review of Systems   Constitutional: Positive for fatigue. Negative for activity change, appetite change and fever.   HENT: Negative for facial swelling (none recently).    Eyes: Negative for visual disturbance.   Respiratory: Negative for shortness of breath.    Cardiovascular: Negative for chest pain and leg swelling.   Gastrointestinal: Positive for constipation (easily relieved with OTC meds). Negative for abdominal pain and diarrhea.   Endocrine: Positive for cold intolerance.   Genitourinary: Positive for flank pain. Negative for difficulty urinating, dysuria and hematuria.   Musculoskeletal: Positive for arthralgias and back pain.   Skin: Negative for rash.   Allergic/Immunologic: Negative for immunocompromised state.   Neurological: Positive for headaches (migraine). Negative for weakness.       Objective:      Physical Exam   Constitutional: She is oriented to person, place, and time. She appears well-nourished. No distress.   HENT:   Mouth/Throat: Oropharynx is clear and moist.   Eyes: No scleral icterus.   Neck: No JVD present.   Cardiovascular: S1 normal and S2 normal. Exam reveals no friction rub.   Pulmonary/Chest: Breath sounds normal. She  has no wheezes. She has no rales.   Abdominal: Soft.   Musculoskeletal: She exhibits edema.   Neurological: She is alert and oriented to person, place, and time.   Skin: Skin is warm and dry.   Psychiatric: She has a normal mood and affect.   Nursing note and vitals reviewed.      Assessment:       1. CKD (chronic kidney disease) stage 3, GFR 30-59 ml/min    2. UTI (urinary tract infection), uncomplicated    3. Acute kidney injury    4. Pulmonary hypertension    5. Hypoxemia requiring supplemental oxygen    6. S/P MVR (mitral valve repair)    7. Coronary artery disease involving native coronary artery of native heart without angina pectoris    8. Anticoagulated on Coumadin    9. Other iron deficiency anemia    10. Vitamin D deficiency    11. Gastric bypass status for obesity        Plan:              CKD vs ARIEL  - I suspect CKD based on previous creatinine levels  - she will have urine studies today to include quantitating proteinuria and a renal u/s will be scheduled to evaluate the size and echogenicity of her kidneys  - will also check a urcx based on her current symptoms    Volume status  - given her pulmonary hypertension she will always have some peripheral edema and cautioned with overdiuresing    HAMLET  - see Heme    Mineral and Bone Disease  - check D level      Labs and renal u/s, release results

## 2019-02-06 ENCOUNTER — TELEPHONE (OUTPATIENT)
Dept: NEUROLOGY | Facility: CLINIC | Age: 74
End: 2019-02-06

## 2019-02-06 DIAGNOSIS — G43.719 INTRACTABLE CHRONIC MIGRAINE WITHOUT AURA AND WITHOUT STATUS MIGRAINOSUS: Primary | ICD-10-CM

## 2019-02-07 ENCOUNTER — PATIENT MESSAGE (OUTPATIENT)
Dept: PHARMACY | Facility: CLINIC | Age: 74
End: 2019-02-07

## 2019-02-14 DIAGNOSIS — E03.4 HYPOTHYROIDISM DUE TO ACQUIRED ATROPHY OF THYROID: ICD-10-CM

## 2019-02-15 RX ORDER — LEVOTHYROXINE SODIUM 112 UG/1
112 TABLET ORAL DAILY
Qty: 90 TABLET | Refills: 0 | Status: SHIPPED | OUTPATIENT
Start: 2019-02-15 | End: 2020-02-15

## 2019-02-15 NOTE — PROGRESS NOTES
Refill Authorization Note     is requesting a refill authorization.    Brief assessment and rationale for refill: APPROVE; Needs Appt  Name and strength of medication: levothyroxine (SYNTHROID) 112 MCG tablet  Medication-related problems identified: Requires appointment    Medication Therapy Plan: THYROID levels normal - lco under lab results 10/18; Needs Appt(ANNUAL); approve 3 more    Medication reconciliation completed: No              How patient will take medication: t1t po qd          Comments:

## 2019-02-15 NOTE — PROGRESS NOTES
Refill Authorization Note     is requesting a refill authorization.    Brief assessment and rationale for refill: DEFER: nco/ Needs Appt  Name and strength of medication: levothyroxine (SYNTHROID) 112 MCG tablet  Medication-related problems identified: Requires appointment    Medication Therapy Plan: THYROID-nco; Needs Appt(ANNUAL); defer to you     Medication reconciliation completed: No              How patient will take medication: t1t po qd          Comments:   Last Thyroid (12 months or within 3 months of dosage adjustment)  Lab Results   Component Value Date    TSH 2.823 10/05/2018    TSH 2.280 12/22/2016    TSH 0.687 12/19/2016    Lab Results   Component Value Date    FREET4 1.20 08/22/2016    FREET4 1.41 06/09/2016    FREET4 1.13 09/07/2011        Appointments (past 12 m or future 3m authorizing provider)  LAST VISIT DATE  Erik Jacobsen MD 3/8/2018         NEXT VISIT DATE  Erik Jacobsen MD Visit date not found

## 2019-02-16 ENCOUNTER — TELEPHONE (OUTPATIENT)
Dept: FAMILY MEDICINE | Facility: CLINIC | Age: 74
End: 2019-02-16

## 2019-02-16 NOTE — TELEPHONE ENCOUNTER
----- Message from Barby Ignacio sent at 2/16/2019  9:06 AM CST -----  Contact: [phone]  Patient is returning nurse's phone call.  Please call patient back at [phone].

## 2019-02-19 ENCOUNTER — PROCEDURE VISIT (OUTPATIENT)
Dept: NEUROLOGY | Facility: CLINIC | Age: 74
End: 2019-02-19
Payer: MEDICARE

## 2019-02-19 ENCOUNTER — PATIENT OUTREACH (OUTPATIENT)
Dept: ADMINISTRATIVE | Facility: HOSPITAL | Age: 74
End: 2019-02-19

## 2019-02-19 ENCOUNTER — TELEPHONE (OUTPATIENT)
Dept: NEUROLOGY | Facility: CLINIC | Age: 74
End: 2019-02-19

## 2019-02-19 VITALS
RESPIRATION RATE: 16 BRPM | WEIGHT: 121 LBS | SYSTOLIC BLOOD PRESSURE: 163 MMHG | HEIGHT: 64 IN | HEART RATE: 72 BPM | DIASTOLIC BLOOD PRESSURE: 70 MMHG | BODY MASS INDEX: 20.66 KG/M2

## 2019-02-19 DIAGNOSIS — G43.719 INTRACTABLE CHRONIC MIGRAINE WITHOUT AURA AND WITHOUT STATUS MIGRAINOSUS: Primary | ICD-10-CM

## 2019-02-19 PROCEDURE — 64615 PR CHEMODENERVATION OF MUSCLE FOR CHRONIC MIGRAINE: ICD-10-PCS | Mod: S$GLB,,, | Performed by: PSYCHIATRY & NEUROLOGY

## 2019-02-19 PROCEDURE — 64615 CHEMODENERV MUSC MIGRAINE: CPT | Mod: S$GLB,,, | Performed by: PSYCHIATRY & NEUROLOGY

## 2019-02-19 NOTE — PROCEDURES
Procedures   BOTOX PROCEDURE    PROCEDURE PERFORMED: Botulinum toxin injection (10809)    CLINICAL INDICATION: G43.719  NDC: 2509-2088-13    A time out was conducted just before the start of the procedure to verify the correct patient and procedure, procedure location, and all relevant critical information.       This is the first Botox injections and I am aiming for at least 50%  improvement in the patient's symptoms. Frequency of treatment is every 3 months unless no response to the treatments, at which time we will discontinue the injections.     DESCRIPTION OF PROCEDURE: After obtaining informed consent and under aseptic technique, a total of 155 units of botulinum toxin type A were injected in the following muscles: Procerus 5 units,  5 units bilaterally, frontalis 20 units, temporalis 20 units bilaterally, occipitalis 15 units, upper cervical paraspinals 10 units bilaterally and trapezius 15 units bilaterally. The patient was given a total of 155 units in 31 sites.The patient tolerated the procedure well. There were no complications. The patient was given a prescription for repeat treatment in 3 months.     Unavoidable waste 45 units          Yousif Nelson M.D  Medical Director, Headache and Facial Pain  [facility]

## 2019-02-19 NOTE — PROGRESS NOTES
Health Maintenance Due   Topic Date Due    High Dose Statin  06/09/1965    DEXA SCAN  07/11/2012    Mammogram  06/03/2018

## 2019-02-19 NOTE — TELEPHONE ENCOUNTER
----- Message from Susana Mena sent at 2/19/2019 12:25 PM CST -----  Contact: ramón graham  Type: Needs Medical Advice  Who Called:  Khloe Cheng Call Back Number: [phone]  Additional Information:patient is waiting on antibiotics to be sent to the pharmacy today she is there and meds are not there

## 2019-02-20 ENCOUNTER — TELEPHONE (OUTPATIENT)
Dept: NEUROLOGY | Facility: CLINIC | Age: 74
End: 2019-02-20

## 2019-02-20 DIAGNOSIS — G43.719 INTRACTABLE CHRONIC MIGRAINE WITHOUT AURA AND WITHOUT STATUS MIGRAINOSUS: Primary | ICD-10-CM

## 2019-02-20 RX ORDER — GABAPENTIN 300 MG/1
300 CAPSULE ORAL 3 TIMES DAILY
Qty: 90 CAPSULE | Refills: 11 | Status: ON HOLD | OUTPATIENT
Start: 2019-02-20 | End: 2019-04-29 | Stop reason: HOSPADM

## 2019-02-20 NOTE — TELEPHONE ENCOUNTER
----- Message from Aditya Lee sent at 2/20/2019  9:04 AM CST -----  Contact: Ash Ziegler  Type: Needs Medical Advice    Who Called:  Ash Ziegler  Pharmacy name and phone #:    [facility] [place] - [address]  [address] [facility] [address]  Phone: [phone] Fax: [phone]  Best Call Back Number: [phone]  Additional Information: Patient is trying to get onabotulinumtoxina injection 200 Units. Please advise where it was sent

## 2019-02-20 NOTE — TELEPHONE ENCOUNTER
Returned pharmacy call and spoke with Ash in regards to patient. Informed Ash botox will be delivered to our clinic and patient will be given it in clinic. Verbalized understanding

## 2019-02-20 NOTE — TELEPHONE ENCOUNTER
----- Message from Barby Ignacio sent at 2/20/2019  9:36 AM CST -----  Contact: [phone]  Patient is requesting a call back from the nurse stated she was seen on 2/19/19 and prescription was to be called in and pharmacy haven't received anything.    Please call the patient upon request at phone number [phone].    Patient will be using   [facility] [place] [address] [place] [address] [address] [facility] [address]  Phone: [phone] Fax: [phone]

## 2019-02-22 NOTE — TELEPHONE ENCOUNTER
Call attempt 2 for Emgality initial consult - LVM; MyChart message not read. $0 copay at 004.    Laron Blackwell, PharmD  Clinical Pharmacist   [facility]   P: [phone]

## 2019-02-26 ENCOUNTER — TELEPHONE (OUTPATIENT)
Dept: CARDIOLOGY | Facility: CLINIC | Age: 74
End: 2019-02-26

## 2019-02-26 NOTE — TELEPHONE ENCOUNTER
----- Message from Cheryl Ghosh sent at 2/26/2019  9:48 AM CST -----  Contact: self  Type:  Sooner Apoointment Request    Caller is requesting a sooner appointment.  Caller declined first available appointment listed below.  Caller will not accept being placed on the waitlist and is requesting a message be sent to doctor.    Name of Caller:  self  When is the first available appointment?  04/03/19  Symptoms:  discharge 02/25/19 [place] had a fall, abnormality with her heart  Best Call Back Number:  [phone]  Additional Information:  Patient needs to be seen. Hospital did not want her to leave. Please call patient. Thanks!

## 2019-02-28 ENCOUNTER — PATIENT OUTREACH (OUTPATIENT)
Dept: ADMINISTRATIVE | Facility: HOSPITAL | Age: 74
End: 2019-02-28

## 2019-02-28 NOTE — LETTER
February 28, 2019    Dulce Cortes Andriys Matthew  [address]  [place] [address]             [facility]  [address]  [facility] [place] [facility] [address]  Phone: [phone] Dear Ms. Castro:    We have tried to reach you by [facility] email unsuccessfully.    [facility] is committed to your overall health.  To help you get the most out of each of your visits, we will review your information to make sure you are up to date on all of your recommended tests and/or procedures.       Erik Jacobsen MD   has found that your chart shows you may be due for the following:     Osteoporosis screening (DEXA SCAN)   Mammogram     If you have had any of the above done at another facility, please bring the records with you or fax them to [phone] so that your record at [facility] will be complete. If you have not had any of these tests or procedures done recently and would like to complete this testing ,  please call [phone] or send a message through your [facility] portal to your provider's office.     If you have an upcoming scheduled appointment for the above test and/or procedures, please disregard this letter.     If you are currently taking medication, please bring it with you to your appointment for review.       Sincerely,    MD Sharyn Singh  Clinical Care Coordinator  [facility]

## 2019-02-28 NOTE — PROGRESS NOTES
Health Maintenance Due   Topic Date Due    High Dose Statin  06/09/1965    DEXA SCAN  07/11/2012    Mammogram  06/03/2018     Portal outreach un-read by patient.  Outreach mailed today

## 2019-03-01 ENCOUNTER — OFFICE VISIT (OUTPATIENT)
Dept: FAMILY MEDICINE | Facility: CLINIC | Age: 74
End: 2019-03-01
Payer: MEDICARE

## 2019-03-01 ENCOUNTER — HOSPITAL ENCOUNTER (OUTPATIENT)
Dept: RADIOLOGY | Facility: HOSPITAL | Age: 74
Discharge: HOME | End: 2019-03-01
Attending: PHYSICIAN ASSISTANT
Payer: MEDICARE

## 2019-03-01 VITALS
HEIGHT: 64 IN | WEIGHT: 116.88 LBS | TEMPERATURE: 99 F | SYSTOLIC BLOOD PRESSURE: 164 MMHG | DIASTOLIC BLOOD PRESSURE: 76 MMHG | OXYGEN SATURATION: 98 % | HEART RATE: 81 BPM | BODY MASS INDEX: 19.96 KG/M2

## 2019-03-01 DIAGNOSIS — R29.6 FALLS FREQUENTLY: ICD-10-CM

## 2019-03-01 DIAGNOSIS — M25.551 RIGHT HIP PAIN: Primary | ICD-10-CM

## 2019-03-01 DIAGNOSIS — R35.0 FREQUENCY OF URINATION: ICD-10-CM

## 2019-03-01 DIAGNOSIS — M25.551 RIGHT HIP PAIN: ICD-10-CM

## 2019-03-01 DIAGNOSIS — Z79.01 ANTICOAGULATED ON COUMADIN: ICD-10-CM

## 2019-03-01 DIAGNOSIS — I10 ESSENTIAL HYPERTENSION: ICD-10-CM

## 2019-03-01 DIAGNOSIS — F31.0 BIPOLAR AFFECTIVE DISORDER, CURRENT EPISODE HYPOMANIC: ICD-10-CM

## 2019-03-01 DIAGNOSIS — N18.30 CKD (CHRONIC KIDNEY DISEASE) STAGE 3, GFR 30-59 ML/MIN: ICD-10-CM

## 2019-03-01 PROCEDURE — 99214 PR OFFICE/OUTPT VISIT, EST, LEVL IV, 30-39 MIN: ICD-10-PCS | Mod: HCNC,S$GLB,, | Performed by: PHYSICIAN ASSISTANT

## 2019-03-01 PROCEDURE — 99999 PR PBB SHADOW E&M-EST. PATIENT-LVL IV: CPT | Mod: PBBFAC,HCNC,, | Performed by: PHYSICIAN ASSISTANT

## 2019-03-01 PROCEDURE — 1101F PR PT FALLS ASSESS DOC 0-1 FALLS W/OUT INJ PAST YR: ICD-10-PCS | Mod: HCNC,CPTII,S$GLB, | Performed by: PHYSICIAN ASSISTANT

## 2019-03-01 PROCEDURE — 1101F PT FALLS ASSESS-DOCD LE1/YR: CPT | Mod: HCNC,CPTII,S$GLB, | Performed by: PHYSICIAN ASSISTANT

## 2019-03-01 PROCEDURE — 73502 XR HIP 2 VIEW RIGHT: ICD-10-PCS | Mod: 26,HCNC,RT, | Performed by: RADIOLOGY

## 2019-03-01 PROCEDURE — 73502 X-RAY EXAM HIP UNI 2-3 VIEWS: CPT | Mod: TC,HCNC,FY,PO,RT

## 2019-03-01 PROCEDURE — 3078F DIAST BP <80 MM HG: CPT | Mod: HCNC,CPTII,S$GLB, | Performed by: PHYSICIAN ASSISTANT

## 2019-03-01 PROCEDURE — 99999 PR PBB SHADOW E&M-EST. PATIENT-LVL IV: ICD-10-PCS | Mod: PBBFAC,HCNC,, | Performed by: PHYSICIAN ASSISTANT

## 2019-03-01 PROCEDURE — 3077F PR MOST RECENT SYSTOLIC BLOOD PRESSURE >= 140 MM HG: ICD-10-PCS | Mod: HCNC,CPTII,S$GLB, | Performed by: PHYSICIAN ASSISTANT

## 2019-03-01 PROCEDURE — 99214 OFFICE O/P EST MOD 30 MIN: CPT | Mod: HCNC,S$GLB,, | Performed by: PHYSICIAN ASSISTANT

## 2019-03-01 PROCEDURE — 99499 UNLISTED E&M SERVICE: CPT | Mod: S$GLB,,, | Performed by: PHYSICIAN ASSISTANT

## 2019-03-01 PROCEDURE — 3077F SYST BP >= 140 MM HG: CPT | Mod: HCNC,CPTII,S$GLB, | Performed by: PHYSICIAN ASSISTANT

## 2019-03-01 PROCEDURE — 99499 RISK ADDL DX/OHS AUDIT: ICD-10-PCS | Mod: S$GLB,,, | Performed by: PHYSICIAN ASSISTANT

## 2019-03-01 PROCEDURE — 73502 X-RAY EXAM HIP UNI 2-3 VIEWS: CPT | Mod: 26,HCNC,RT, | Performed by: RADIOLOGY

## 2019-03-01 PROCEDURE — 3078F PR MOST RECENT DIASTOLIC BLOOD PRESSURE < 80 MM HG: ICD-10-PCS | Mod: HCNC,CPTII,S$GLB, | Performed by: PHYSICIAN ASSISTANT

## 2019-03-01 NOTE — PATIENT INSTRUCTIONS
Take 1/2 capful of Miralax daily in a glass of water daily.    Will call with results.    Thanks for seeing me,  Maeve Cruz PA-C

## 2019-03-01 NOTE — Clinical Note
I am going to call this patient and tell her that she has a fracture.  I know the next question is going to be about pain medication because her visit was like that yesterday.  Does she have any options for pain medication with her CKD and other comorbities and her allergies?Thanks, Maeve Cruz PA-C

## 2019-03-01 NOTE — PROGRESS NOTES
"Subjective:      Patient ID: Dulce Castro is a 73 y.o. female.    Chief Complaint: Fall (hip)    HPI   Patient fell on 2/22/19 and landed on right hip and arm and hand.  Xrayed at [facility] and didn't see anything broken.  Left emergency room AMA as they were investigating cardiac issues, and she said that she was fine when Dr. Zamudio saw her last.  Now, able to use walker to use the restroom, but initially was using a bedpan.  Tylenol does not provide any pain relief.    Dr. Haji prescribes 3 antidepressants and Tegretol twice a day to control patient's depression.  Taking Xanax 1mg 4xs a day.    Review of Systems   Constitutional: Positive for fever (99F). Negative for chills.   Respiratory: Negative for shortness of breath.    Cardiovascular: Negative for chest pain.   Gastrointestinal: Positive for constipation and nausea. Negative for abdominal pain, blood in stool, diarrhea and vomiting.   Genitourinary: Positive for frequency. Negative for dysuria and hematuria.   Musculoskeletal: Positive for gait problem.   Neurological: Positive for headaches.       Objective:   BP (!) 164/76   Pulse 81   Temp 98.6 °F (37 °C)   Ht 5' 4" (1.626 m)   Wt 53 kg (116 lb 13.5 oz)   LMP  (LMP Unknown)   SpO2 98%   BMI 20.06 kg/m²      Physical Exam   Constitutional: She is oriented to person, place, and time. She appears well-developed. No distress.   Patient currently in wheelchair.   HENT:   Head: Normocephalic and atraumatic.   Right Ear: Hearing and external ear normal.   Left Ear: Hearing and external ear normal.   Nose: Nose normal.   Eyes: Conjunctivae and lids are normal.   Neck: Normal range of motion and phonation normal. Neck supple.   Cardiovascular: Normal rate and regular rhythm.   Murmur heard.  Pulmonary/Chest: Effort normal and breath sounds normal. No stridor. No respiratory distress. She has no decreased breath sounds. She has no wheezes. She has no rhonchi. She has no rales. "   Musculoskeletal:        Right hip: She exhibits decreased range of motion and decreased strength.   Lymphadenopathy:        Head (right side): No submental, no submandibular, no tonsillar, no preauricular, no posterior auricular and no occipital adenopathy present.        Head (left side): No submental, no submandibular, no tonsillar, no preauricular, no posterior auricular and no occipital adenopathy present.     She has no cervical adenopathy.   Neurological: She is alert and oriented to person, place, and time.   Skin: Skin is warm, dry and intact. Ecchymosis (right hand) noted.   Psychiatric: She has a normal mood and affect. Her speech is normal and behavior is normal. Judgment and thought content normal. Cognition and memory are normal.   Nursing note and vitals reviewed.     Assessment:      1. Right hip pain    2. Frequency of urination    3. Falls frequently    4. Anticoagulated on Coumadin    5. CKD (chronic kidney disease) stage 3, GFR 30-59 ml/min    6. Essential hypertension    7. Bipolar affective disorder, current episode hypomanic       Plan:   1. Right hip pain  Will call with results.  - X-Ray Hip 2 View Right; Future-acute fracture thru inferior pubic ramus on right  - Ambulatory referral to Orthopedics    2. Frequency of urination  Will call with results.  Did not resemble cauda equina syndrome because she explained that she has been able to control her bladder, but having to go frequently.  - URINALYSIS; Future-negative except 1+ bilirubin  - Urine culture; Future    3. Falls frequently  Many factors may be contributing to this.    4. Anticoagulated on Coumadin  Continued.    5. CKD (chronic kidney disease) stage 3, GFR 30-59 ml/min  Continuing to decrease.    6. Essential hypertension  Continue to monitor and continue current medications.    7. Bipolar affective disorder, current episode hypomanic  Continue current medications.  Dr. Haji manages this.    Will call [phone] with  results.  Follow up with ortho.  Patient agreed with plan and expressed understanding.    Called twice on Saturday to tell patient of results and left two messages on voicemail.

## 2019-03-04 ENCOUNTER — CLINICAL SUPPORT (OUTPATIENT)
Dept: CARDIOLOGY | Facility: CLINIC | Age: 74
End: 2019-03-04
Attending: INTERNAL MEDICINE
Payer: MEDICARE

## 2019-03-04 ENCOUNTER — OFFICE VISIT (OUTPATIENT)
Dept: FAMILY MEDICINE | Facility: CLINIC | Age: 74
End: 2019-03-04
Payer: MEDICARE

## 2019-03-04 ENCOUNTER — HOSPITAL ENCOUNTER (OUTPATIENT)
Dept: RADIOLOGY | Facility: HOSPITAL | Age: 74
Discharge: HOME | End: 2019-03-04
Attending: FAMILY MEDICINE
Payer: MEDICARE

## 2019-03-04 VITALS
DIASTOLIC BLOOD PRESSURE: 82 MMHG | HEART RATE: 75 BPM | OXYGEN SATURATION: 98 % | WEIGHT: 126.56 LBS | RESPIRATION RATE: 18 BRPM | BODY MASS INDEX: 21.61 KG/M2 | SYSTOLIC BLOOD PRESSURE: 138 MMHG | HEIGHT: 64 IN

## 2019-03-04 DIAGNOSIS — S32.591A CLOSED FRACTURE OF RIGHT INFERIOR PUBIC RAMUS, INITIAL ENCOUNTER: ICD-10-CM

## 2019-03-04 DIAGNOSIS — Z95.0 CARDIAC PACEMAKER IN SITU: ICD-10-CM

## 2019-03-04 DIAGNOSIS — M25.531 RIGHT WRIST PAIN: ICD-10-CM

## 2019-03-04 DIAGNOSIS — M25.551 RIGHT HIP PAIN: Primary | ICD-10-CM

## 2019-03-04 DIAGNOSIS — I49.5 SSS (SICK SINUS SYNDROME): ICD-10-CM

## 2019-03-04 PROCEDURE — 99499 UNLISTED E&M SERVICE: CPT | Mod: S$GLB,,, | Performed by: FAMILY MEDICINE

## 2019-03-04 PROCEDURE — 99499 RISK ADDL DX/OHS AUDIT: ICD-10-PCS | Mod: S$GLB,,, | Performed by: FAMILY MEDICINE

## 2019-03-04 PROCEDURE — 3075F PR MOST RECENT SYSTOLIC BLOOD PRESS GE 130-139MM HG: ICD-10-PCS | Mod: HCNC,CPTII,S$GLB, | Performed by: FAMILY MEDICINE

## 2019-03-04 PROCEDURE — 3079F DIAST BP 80-89 MM HG: CPT | Mod: HCNC,CPTII,S$GLB, | Performed by: FAMILY MEDICINE

## 2019-03-04 PROCEDURE — 73110 X-RAY EXAM OF WRIST: CPT | Mod: 26,HCNC,RT, | Performed by: RADIOLOGY

## 2019-03-04 PROCEDURE — 99214 PR OFFICE/OUTPT VISIT, EST, LEVL IV, 30-39 MIN: ICD-10-PCS | Mod: HCNC,S$GLB,, | Performed by: FAMILY MEDICINE

## 2019-03-04 PROCEDURE — 93296 REM INTERROG EVL PM/IDS: CPT | Mod: HCNC,S$GLB,, | Performed by: INTERNAL MEDICINE

## 2019-03-04 PROCEDURE — 3075F SYST BP GE 130 - 139MM HG: CPT | Mod: HCNC,CPTII,S$GLB, | Performed by: FAMILY MEDICINE

## 2019-03-04 PROCEDURE — 93294 CARDIAC DEVICE CHECK CHECK - REMOTE: ICD-10-PCS | Mod: HCNC,S$GLB,, | Performed by: INTERNAL MEDICINE

## 2019-03-04 PROCEDURE — 1101F PR PT FALLS ASSESS DOC 0-1 FALLS W/OUT INJ PAST YR: ICD-10-PCS | Mod: HCNC,CPTII,S$GLB, | Performed by: FAMILY MEDICINE

## 2019-03-04 PROCEDURE — 99214 OFFICE O/P EST MOD 30 MIN: CPT | Mod: HCNC,S$GLB,, | Performed by: FAMILY MEDICINE

## 2019-03-04 PROCEDURE — 93296 CARDIAC DEVICE CHECK CHECK - REMOTE: ICD-10-PCS | Mod: HCNC,S$GLB,, | Performed by: INTERNAL MEDICINE

## 2019-03-04 PROCEDURE — 73110 XR WRIST COMPLETE 3 VIEWS RIGHT: ICD-10-PCS | Mod: 26,HCNC,RT, | Performed by: RADIOLOGY

## 2019-03-04 PROCEDURE — 1101F PT FALLS ASSESS-DOCD LE1/YR: CPT | Mod: HCNC,CPTII,S$GLB, | Performed by: FAMILY MEDICINE

## 2019-03-04 PROCEDURE — 99999 PR PBB SHADOW E&M-EST. PATIENT-LVL IV: CPT | Mod: PBBFAC,HCNC,, | Performed by: FAMILY MEDICINE

## 2019-03-04 PROCEDURE — 73110 X-RAY EXAM OF WRIST: CPT | Mod: TC,HCNC,FY,PO,RT

## 2019-03-04 PROCEDURE — 93294 REM INTERROG EVL PM/LDLS PM: CPT | Mod: HCNC,S$GLB,, | Performed by: INTERNAL MEDICINE

## 2019-03-04 PROCEDURE — 3079F PR MOST RECENT DIASTOLIC BLOOD PRESSURE 80-89 MM HG: ICD-10-PCS | Mod: HCNC,CPTII,S$GLB, | Performed by: FAMILY MEDICINE

## 2019-03-04 PROCEDURE — 99999 PR PBB SHADOW E&M-EST. PATIENT-LVL IV: ICD-10-PCS | Mod: PBBFAC,HCNC,, | Performed by: FAMILY MEDICINE

## 2019-03-04 RX ORDER — HYDROCODONE BITARTRATE AND ACETAMINOPHEN 5; 325 MG/1; MG/1
1 TABLET ORAL 3 TIMES DAILY PRN
Qty: 30 TABLET | Refills: 0 | Status: SHIPPED | OUTPATIENT
Start: 2019-03-04 | End: 2019-03-07 | Stop reason: SDUPTHER

## 2019-03-04 NOTE — PROGRESS NOTES
Subjective:       Patient ID: Dulce Castro is a 73 y.o. female.    Chief Complaint: Hip Pain (R>L) and Leg Pain (R>L)    HPI     S/p fall on right hip on 2/22/2019.      Saw my colleague last week and had xray of right hip. Showed inferior pubic ramus fx. Pain with weight bearing in right buttock area.  Ps: 6-8/10.     Also, from the fall, reports right wrist pain that is less severe. Mild pain with rom.       Review of Systems      Review of Systems   Constitutional: Negative for fever and chills.   HENT: Negative for hearing loss and neck stiffness.    Eyes: Negative for redness and itching.   Respiratory: Negative for cough and choking.    Cardiovascular: Negative for chest pain and leg swelling.  Abdomen: Negative for abdominal pain and blood in stool.   Genitourinary: Negative for dysuria and flank pain.   Musculoskeletal: Negative for back pain and gait problem.   Neurological: Negative for light-headedness and headaches.   Hematological: Negative for adenopathy.   Psychiatric/Behavioral: Negative for behavioral problems.     Objective:      Physical Exam   Constitutional: She appears well-developed.   In wheelchair   HENT:   Head: Normocephalic and atraumatic.   Eyes: Conjunctivae are normal. Pupils are equal, round, and reactive to light.   Neck: Normal range of motion.   Cardiovascular: Normal rate and regular rhythm.   No murmur heard.  Pulmonary/Chest: Effort normal and breath sounds normal.   Musculoskeletal:   Right wrist: tend of dorsal radius aspect. Mild pain with flexion.     Right hip: pain along buttock area with weight bearing.     Lymphadenopathy:     She has no cervical adenopathy.       Assessment:       1. Right hip pain    2. Right wrist pain    3. Closed fracture of right inferior pubic ramus, initial encounter        Plan:       Right hip pain  -     Ambulatory referral to Orthopedics    Right wrist pain  -     X-Ray Wrist Complete Right; Future; Expected date:  03/04/2019    Closed fracture of right inferior pubic ramus, initial encounter  -     Ambulatory referral to Orthopedics    Other orders  -     HYDROcodone-acetaminophen (NORCO) 5-325 mg per tablet; Take 1 tablet by mouth 3 (three) times daily as needed for Pain.  Dispense: 30 tablet; Refill: 0            Plan:  Start norco  Xray right wrist today  Refer to ortho      Medication List with Changes/Refills   New Medications    HYDROCODONE-ACETAMINOPHEN (NORCO) 5-325 MG PER TABLET    Take 1 tablet by mouth 3 (three) times daily as needed for Pain.   Current Medications    ALPRAZOLAM (XANAX) 1 MG TABLET    Take 1 mg by mouth 4 (four) times daily as needed.    ASPIRIN-ACETAMINOPHEN-CAFFEINE 250-250-65 MG (EXCEDRIN MIGRAINE) 250-250-65 MG PER TABLET    Take 2 tablets by mouth every 6 (six) hours as needed for Pain.    CARBAMAZEPINE (TEGRETOL) 200 MG TABLET    Take 200 mg by mouth 2 (two) times daily.    CYANOCOBALAMIN/COBAMAMIDE (B12 SL)    Place under the tongue.    DIPHENHYDRAMINE (BENADRYL) 25 MG CAPSULE    Take 1 each (25 mg total) by mouth daily as needed for Itching.    FUROSEMIDE (LASIX) 40 MG TABLET    Take 1 tablet (40 mg total) by mouth Every 3 (three) days. Hold if sbp < 100    GABAPENTIN (NEURONTIN) 300 MG CAPSULE    Take 1 capsule (300 mg total) by mouth 3 (three) times daily.    LEVOTHYROXINE (SYNTHROID) 112 MCG TABLET    TAKE 1 TABLET (112 MCG TOTAL) BY MOUTH ONCE DAILY.    METOLAZONE (ZAROXOLYN) 5 MG TABLET        OXYGEN-AIR DELIVERY SYSTEMS MISC    3 L by Nasal route Daily. uses hs and prn for SOB    POTASSIUM CHLORIDE SA (K-DUR,KLOR-CON) 20 MEQ TABLET    Take 2 tablet every third day with furosemide and 1 tablet once a day every other day.    PROCHLORPERAZINE (COMPAZINE) 25 MG SUPPOSITORY    INSERT 1 SUPPOSITORY PER RECTUM EVERY 12 HOURS AS NEEDED FOR NAUSEA    TIZANIDINE (ZANAFLEX) 4 MG TABLET    TAKE 1 TABLET (4 MG TOTAL) BY MOUTH EVERY 8 (EIGHT) HOURS AS NEEDED.    TRAZODONE (DESYREL) 300 MG TABLET     Take 0.5 tablets (150 mg total) by mouth nightly as needed for Insomnia.    VENLAFAXINE (EFFEXOR-XR) 75 MG 24 HR CAPSULE    Take 225 mg by mouth once daily.    Discontinued Medications    CETIRIZINE (ZYRTEC) 10 MG TABLET    Take 10 mg by mouth once daily.

## 2019-03-06 LAB
AV DELAY - LONGEST: 410 MSEC
AV DELAY - SHORTEST: 260 MSEC
IMPEDANCE RA LEAD (NATIVE): 507 OHMS
IMPEDANCE RA LEAD: 429 OHMS
P/R-WAVE RA LEAD (NATIVE): 8.7 MV
P/R-WAVE RA LEAD: NORMAL MV
PV DELAY - LONGEST: 390 MSEC
PV DELAY - SHORTEST: 240 MSEC

## 2019-03-06 NOTE — PROGRESS NOTES
DATE: 3/6/2019  PATIENT: Dulce Castro  REFERRING MD:   CHIEF COMPLAINT:   Chief Complaint   Patient presents with    Right Hip - Pain       HISTORY:  Dulce Castro is a 73 y.o. female  who presents for initial evaluation of her right hip pain.  She had a fall on 2/22/19 when she was walking to her car after eating lunch with friends and tripped on the curb.  She was taken to the ER but xray did not reveal fracture.  She was seen in primary care 03/01/19 and repeat xray revealed a nondisplaced inferior pubic ramus fracture.  Patient's spouse is in attendance today and participating in the history portion of the exam.  She is in a wheelchair today and reports her pain is 9/10.  She has a walker and shower chair at home.  She is taking norco with moderate relief.  Her pain is constant and increases with movement but after she takes a pain pill she is able to get up to use the bathroom.  She is requesting a refill today as she is scared to run out of pain pills.  She also complains she hurt her right wrist when she fell but her hip was hurting so bad that she did not notice the wrist initially. She complains of stiffness and loss of motion.      PAST MEDICAL/SURGICAL HISTORY:  Past Medical History:   Diagnosis Date    Allergy     Anemia     Anticoagulant long-term use     Anxiety     Arthritis     Bipolar disorder     sees Dr. Popeye Haji, psychiatrist.     Blood transfusion     Carpal tunnel syndrome     Cataract     CHF (congestive heart failure)     CKD (chronic kidney disease) stage 3, GFR 30-59 ml/min     Dr. Levy    Depression     VEE (dyspnea on exertion)     general activity    GERD (gastroesophageal reflux disease)     Heart murmur     Hypertension     Irritable bladder     Meningitis     Mitral valve prolapse     On home O2     @ 3 liters    TEE (obstructive sleep apnea)     Pulmonary HTN     Thyroid disease      Past Surgical History:   Procedure  Laterality Date     bladder stimulator      [employer]    ABDOMINAL SURGERY      APPENDECTOMY      CARDIAC PACEMAKER PLACEMENT  10/2016    CARDIAC SURGERY  2007    [facility]    CARPAL TUNNEL RELEASE Right     CHOLECYSTECTOMY  2010    COLONOSCOPY N/A 2/17/2017    Performed by Artie Mills Jr., [facility] at [facility] ENDO    ESOPHAGOGASTRODUODENOSCOPY  09/14/2011    BEAU.   Normal esophagus.  NERD.  Gastric bypass.  Normal anastomosis.  Normal jejunum.    ESOPHAGOGASTRODUODENOSCOPY (EGD) N/A 2/17/2017    Performed by Artie Mills Jr., MD at [facility] ENDO    EYE SURGERY Bilateral     cataracts    FOOT SURGERY Right     2 Hematomas on nerves    GASTRIC BYPASS  2004    HYSTERECTOMY      separate procedures for uterus, ovaries and then an ex-lap for adhesions    INSERTION-PACEMAKER-DUAL N/A 8/23/2016    [facility]    open heart surgery      mitral valve repair    RELEASE-CARPAL TUNNEL Right 1/5/2015    Performed by Jose Hawkins MD at [facility] OR       Current Medications:   Current Outpatient Medications:     alprazolam (XANAX) 1 MG tablet, Take 1 mg by mouth 4 (four) times daily as needed., Disp: , Rfl:     aspirin-acetaminophen-caffeine 250-250-65 mg (EXCEDRIN MIGRAINE) 250-250-65 mg per tablet, Take 2 tablets by mouth every 6 (six) hours as needed for Pain., Disp: , Rfl:     carbamazepine (TEGRETOL) 200 mg tablet, Take 200 mg by mouth 2 (two) times daily., Disp: , Rfl:     CYANOCOBALAMIN/COBAMAMIDE (B12 SL), Place under the tongue., Disp: , Rfl:     diphenhydrAMINE (BENADRYL) 25 mg capsule, Take 1 each (25 mg total) by mouth daily as needed for Itching., Disp: , Rfl: 0    furosemide (LASIX) 40 MG tablet, Take 1 tablet (40 mg total) by mouth Every 3 (three) days. Hold if sbp < 100, Disp: 10 tablet, Rfl: 5    gabapentin (NEURONTIN) 300 MG capsule, Take 1 capsule (300 mg total) by mouth 3 (three) times daily., Disp: 90 capsule, Rfl: 11    HYDROcodone-acetaminophen  (NORCO) 5-325 mg per tablet, Take 1 tablet by mouth 3 (three) times daily as needed for Pain., Disp: 30 tablet, Rfl: 0    levothyroxine (SYNTHROID) 112 MCG tablet, TAKE 1 TABLET (112 MCG TOTAL) BY MOUTH ONCE DAILY., Disp: 90 tablet, Rfl: 0    metOLazone (ZAROXOLYN) 5 MG tablet, , Disp: , Rfl: 3    OXYGEN-AIR DELIVERY SYSTEMS MISC, 3 L by Nasal route Daily. uses hs and prn for SOB, Disp: , Rfl:     potassium chloride SA (K-DUR,KLOR-CON) 20 MEQ tablet, Take 2 tablet every third day with furosemide and 1 tablet once a day every other day., Disp: 40 tablet, Rfl: 5    prochlorperazine (COMPAZINE) 25 MG suppository, INSERT 1 SUPPOSITORY PER RECTUM EVERY 12 HOURS AS NEEDED FOR NAUSEA, Disp: 12 suppository, Rfl: 3    tiZANidine (ZANAFLEX) 4 MG tablet, TAKE 1 TABLET (4 MG TOTAL) BY MOUTH EVERY 8 (EIGHT) HOURS AS NEEDED., Disp: 60 tablet, Rfl: 1    trazodone (DESYREL) 300 MG tablet, Take 0.5 tablets (150 mg total) by mouth nightly as needed for Insomnia. (Patient taking differently: Take 300 mg by mouth nightly. ), Disp: 30 tablet, Rfl: 0    venlafaxine (EFFEXOR-XR) 75 MG 24 hr capsule, Take 225 mg by mouth once daily. , Disp: , Rfl:     Current Facility-Administered Medications:     onabotulinumtoxina injection 200 Units, 200 Units, Intramuscular, Q90 Days, Chey Nelson MD, 200 Units at 06/12/18 1038    onabotulinumtoxina injection 200 Units, 200 Units, Intramuscular, Q90 Days, Chey Nelson MD, 200 Units at 09/04/18 1022    onabotulinumtoxina injection 200 Units, 200 Units, Intramuscular, Q90 Days, Chey Nelson MD, 200 Units at 11/27/18 0941    onabotulinumtoxina injection 200 Units, 200 Units, Intramuscular, Q90 Days, Chey Nelson MD, 200 Units at 02/19/19 0947    Family History: family history was reviewed and is noncontributory  Social History:   Social History     Socioeconomic History    Marital status:      Spouse name: Not on file    Number of children: Not on file  "   Years of education: Not on file    Highest education level: Not on file   Social Needs    Financial resource strain: Not on file    Food insecurity - worry: Not on file    Food insecurity - inability: Not on file    Transportation needs - medical: Not on file    Transportation needs - non-medical: Not on file   Occupational History    Not on file   Tobacco Use    Smoking status: Never Smoker    Smokeless tobacco: Never Used   Substance and Sexual Activity    Alcohol use: Yes     Alcohol/week: 1.2 oz     Types: 1 Glasses of wine, 1 Shots of liquor per week     Comment: once every 6 month Per Pt statement    Drug use: No    Sexual activity: Not Currently   Other Topics Concern    Not on file   Social History Narrative    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       ROS:  Constitution: Negative for chills, fever, and sweats. Negative for unexplained weight loss.  Eyes: no redness, no discharge  Ears: no ear pain or tinnitus  Cardiovascular: Negative for chest pain, irregular heartbeat, leg swelling and palpitations.   Respiratory: Negative for cough and shortness of breath.   Gastrointestinal: Negative for abdominal pain, nausea and vomiting.   Genitourinary: Negative for bladder incontinence and dysuria.   Neurological: Negative for numbness.   Psychiatric/Behavioral: Negative for behavior changes.   Endocrine: Negative for palpitations.   Hematologic/Lymphatic: Negative for bleeding disorders.  Skin: Negative for pruritis or rash.       PHYSICAL EXAM:  Ht 5' 4" (1.626 m)   Wt 57.2 kg (126 lb)   LMP  (LMP Unknown)   BMI 21.63 kg/m²   Dulce Castro is a well developed, well nourished female in no acute distress. Physical examination of the right hip and lumbar spine evaluated the following:    Gait and Alignment  Lumbar spine range of motion  Inspection for ecchymosis, swelling, atrophy, or deformity  Tenderness to palpation " over the bony and soft tissue structures around the lower back/hip  Inspection for intra-articular and/or bursal effusions  Range of Motion and presence of contractures  Sensation and motor strength to the lower extremity  Pain/pop/click with logrolling or range of motion  Straight leg raise testing  Vascular exam to include skin temperature/color/capillary refill    Remarkable findings included:  No edema, effusion, ecchymosis or obvious deformity  Nontender to palpation   ROM and Strength not tested  Sensation intact  Skin warm, dry, intact    Right wrist  No edema or ecchymosis, obvious arthritic enlargement about the distal ulna  Pain with palpation to ulnar wrist  Stiffness with flexion and extension  Sensation intact  Skin wam, dry, intact      IMAGING:   X-ray obtained Right hip performed 03/01/19 and right wristperformed personally reviewed with patient. Radiologist report as follows:   Fracture through the inferior pubic ramus on the right that appears relatively recent.  Possible extension of the fracture through the superior pubic ramus is noted as well but this is not definitive       Right wrist:  Mild diffuse osteoporosis.  Narrowing of the radiocarpal joint, at the triscaphe and degenerative changes at the 1st carpal metacarpal joint are consistent with DJD.      ASSESSMENT:   Right inferior pubic ramus fracture, DOI 02/22/2019  Fall 02/22/2019  Right wrist sprain    PLAN:  The nature of the diagnosis, using models and diagrams when appropriate, was explained to the patient and her  in detail. Treatment option discussed included non-operative measures of continue prescription pain medication as needed, be cautious of constipation, use stool softeners, protected weight bearing as tolderated, frequent calf pumps to prevent DVT, rest,  modification of activities, elevation of extremity, over the counter pain/antiinflammatory relief and physica/occupational therapy. All questions answered and the  patient wishes to proceed with recommendations today. Asha Nunez and I, performed a custom wrist orthotic /brace adjustment, fitting and training with the patient. The patient demonstrated understanding and proper care. This was performed for 15 minutes.  I will order home physical therapy to begin for her right wrist and pelvic fracture protocol.  Follow up in 4 weeks for repeat right hip xray and re-evaluation.

## 2019-03-07 ENCOUNTER — OFFICE VISIT (OUTPATIENT)
Dept: ORTHOPEDICS | Facility: CLINIC | Age: 74
End: 2019-03-07
Payer: MEDICARE

## 2019-03-07 ENCOUNTER — TELEPHONE (OUTPATIENT)
Dept: ORTHOPEDICS | Facility: CLINIC | Age: 74
End: 2019-03-07

## 2019-03-07 VITALS — HEIGHT: 64 IN | BODY MASS INDEX: 21.51 KG/M2 | WEIGHT: 126 LBS

## 2019-03-07 DIAGNOSIS — S63.501A SPRAIN OF RIGHT WRIST, INITIAL ENCOUNTER: ICD-10-CM

## 2019-03-07 DIAGNOSIS — S32.591A INFERIOR PUBIC RAMUS FRACTURE, RIGHT, CLOSED, INITIAL ENCOUNTER: Primary | ICD-10-CM

## 2019-03-07 PROCEDURE — 99999 PR PBB SHADOW E&M-EST. PATIENT-LVL II: CPT | Mod: PBBFAC,HCNC,, | Performed by: NURSE PRACTITIONER

## 2019-03-07 PROCEDURE — 99203 PR OFFICE/OUTPT VISIT, NEW, LEVL III, 30-44 MIN: ICD-10-PCS | Mod: HCNC,57,S$GLB, | Performed by: NURSE PRACTITIONER

## 2019-03-07 PROCEDURE — 27197 CLSD TX PELVIC RING FX: CPT | Mod: HCNC,RT,S$GLB, | Performed by: NURSE PRACTITIONER

## 2019-03-07 PROCEDURE — 97760 PR ORTHOTIC MGMT&TRAINJ INITIAL ENC EA 15 MINS: ICD-10-PCS | Mod: HCNC,GP,S$GLB, | Performed by: NURSE PRACTITIONER

## 2019-03-07 PROCEDURE — 27197 PR CLOSED RX PELVIC RING FX/SUBLUX W/O MANIPULATION: ICD-10-PCS | Mod: HCNC,RT,S$GLB, | Performed by: NURSE PRACTITIONER

## 2019-03-07 PROCEDURE — 99203 OFFICE O/P NEW LOW 30 MIN: CPT | Mod: HCNC,57,S$GLB, | Performed by: NURSE PRACTITIONER

## 2019-03-07 PROCEDURE — 97760 ORTHOTIC MGMT&TRAING 1ST ENC: CPT | Mod: HCNC,GP,S$GLB, | Performed by: NURSE PRACTITIONER

## 2019-03-07 PROCEDURE — 99999 PR PBB SHADOW E&M-EST. PATIENT-LVL II: ICD-10-PCS | Mod: PBBFAC,HCNC,, | Performed by: NURSE PRACTITIONER

## 2019-03-07 RX ORDER — TIZANIDINE 4 MG/1
4 TABLET ORAL EVERY 8 HOURS PRN
Qty: 60 TABLET | Refills: 1 | Status: SHIPPED | OUTPATIENT
Start: 2019-03-07 | End: 2019-04-02 | Stop reason: SDUPTHER

## 2019-03-07 RX ORDER — HYDROCODONE BITARTRATE AND ACETAMINOPHEN 5; 325 MG/1; MG/1
1 TABLET ORAL 3 TIMES DAILY PRN
Qty: 30 TABLET | Refills: 0 | Status: SHIPPED | OUTPATIENT
Start: 2019-03-07 | End: 2019-03-18

## 2019-03-07 NOTE — LETTER
March 7, 2019      Erik Jacobsen MD  [address] [facility] [address] [place] [address]           [facility]  [address] [facility] [address] [place] [address]  Phone: [phone]          Patient: Dulce Castro   MR Number: [id number]   YOB: 1945   Date of Visit: 3/7/2019       Dear Dr. Erik Jacobsen:    Thank you for referring Dulce Castro to me for evaluation. Attached you will find relevant portions of my assessment and plan of care.    If you have questions, please do not hesitate to call me. I look forward to following Dulce Castro along with you.    Sincerely,    Arina Diaz, APRN    Enclosure  CC:  No Recipients    If you would like to receive this communication electronically, please contact [email or web address] or [phone] to request more information on [employer] Link access.    For providers and/or their staff who would like to refer a patient to [facility], please contact us through our one-stop-shop provider referral line, [facility], at [phone].    If you feel you have received this communication in error or would no longer like to receive these types of communications, please e-mail [email or web address]

## 2019-03-07 NOTE — TELEPHONE ENCOUNTER
----- Message from Jaiden Goodwin sent at 3/7/2019  3:50 PM CST -----  Type:  Pharmacy Calling to Clarify an RX    Name of Caller:  Ash  Pharmacy Name:    [facility] [place] [id number] [place] [address] [facility] [address]  Phone: [phone] Fax: [phone]    Prescription Name:  HYDROcodone-acetaminophen (NORCO) 5-325 mg per tablet  What do they need to clarify?:  Too early to fill.    Best Call Back Number:   [phone]   Additional Information:

## 2019-03-07 NOTE — TELEPHONE ENCOUNTER
Contacted Ash at [facility]. Advised prescription can be filled on 3/14/18. Ash verbalized understanding.

## 2019-03-08 DIAGNOSIS — S63.501A SPRAIN OF RIGHT WRIST, INITIAL ENCOUNTER: ICD-10-CM

## 2019-03-08 DIAGNOSIS — S32.591A INFERIOR PUBIC RAMUS FRACTURE, RIGHT, CLOSED, INITIAL ENCOUNTER: Primary | ICD-10-CM

## 2019-03-11 ENCOUNTER — TELEPHONE (OUTPATIENT)
Dept: FAMILY MEDICINE | Facility: CLINIC | Age: 74
End: 2019-03-11

## 2019-03-11 NOTE — PROGRESS NOTES
inform pt via phone that I reviewed the test results and note the following:    Xray of right wrist shows no fracture but does show arthritis.

## 2019-03-15 ENCOUNTER — TELEPHONE (OUTPATIENT)
Dept: ORTHOPEDICS | Facility: CLINIC | Age: 74
End: 2019-03-15

## 2019-03-15 ENCOUNTER — TELEPHONE (OUTPATIENT)
Dept: PHARMACY | Facility: CLINIC | Age: 74
End: 2019-03-15

## 2019-03-15 ENCOUNTER — NURSE TRIAGE (OUTPATIENT)
Dept: ADMINISTRATIVE | Facility: CLINIC | Age: 74
End: 2019-03-15

## 2019-03-15 NOTE — TELEPHONE ENCOUNTER
----- Message from Esteban Higgins sent at 3/15/2019  3:45 PM CDT -----  Contact: patient  Type: Needs Medical Advice      Symptoms (please be specific): Patient has fx pelvis and is extreme pain  How long has patient had these symptoms:  Ever since the fall states pain medication not working   Patient want advice on how to proceed   Best Call Back Number: [phone]  Additional Information: Please call

## 2019-03-15 NOTE — TELEPHONE ENCOUNTER
Called patient for initial consultation for Emgality. Patient was not ready to discuss her therapy at this time as she was not feeling well. She fell recently and has a fractured pelvis. She would like a call back next Wed (3/20) around 11am. Put on schedule for pharmacist to give her a call at that time.

## 2019-03-15 NOTE — TELEPHONE ENCOUNTER
Reason for Disposition   [1] SEVERE pain AND [2] not improved 2 hours after pain medicine/ice packs    Protocols used: HIP INJURY-A-AH  Pt called re broke pelvis 3 weeks ago. Pt did not have surgery. Pt given hydrocodone- may have helped a little at first. R hip pain worse after PT yest. Pt uses [facility] closing at 6pm. Using [facility]. Using walker some. Pt having severe pain. Pt transferred to speak with MD on call.

## 2019-03-18 ENCOUNTER — TELEPHONE (OUTPATIENT)
Dept: ORTHOPEDICS | Facility: CLINIC | Age: 74
End: 2019-03-18

## 2019-03-18 DIAGNOSIS — S32.591D INFERIOR PUBIC RAMUS FRACTURE, RIGHT, WITH ROUTINE HEALING, SUBSEQUENT ENCOUNTER: ICD-10-CM
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
## 2019-03-18 RX ORDER — OXYCODONE AND ACETAMINOPHEN 10; 325 MG/1; MG/1
1 TABLET ORAL EVERY 4 HOURS PRN
Qty: 30 TABLET | Refills: 0 | Status: SHIPPED | OUTPATIENT
Start: 2019-03-18 | End: 2019-04-06

## 2019-03-18 NOTE — TELEPHONE ENCOUNTER
Patient is going to see Emily alaniz [place] today for followup fracture----- Message from Camryn Lazar sent at 3/18/2019  7:05 AM CDT -----  Contact: self/  [phone]  Type:  Same Day Appointment Request    Caller is requesting a same day appointment.     Name of Caller: Ms Castro    When is the first available appointment? NA    Symptoms:Continue pelvic pain   Patient seen by ALBERTINA Diaz on 3/7/2019 for Right hip pain  Closed fracture of right inferior pubic ramus  Patient states need to be seen today   Patient requested [facility] patient states need  appointment today    Best Call Back Number: [phone]    Additional Information: I was not sure of this appointment

## 2019-03-18 NOTE — TELEPHONE ENCOUNTER
Spoke with patient on phone.  Reports was doing well until first session of PT, feels like she over did it and now cannot get pain under control.  Called in prescription for Percocet 10.  Call if no pain relief.  ctnp

## 2019-03-19 ENCOUNTER — PATIENT OUTREACH (OUTPATIENT)
Dept: ADMINISTRATIVE | Facility: HOSPITAL | Age: 74
End: 2019-03-19

## 2019-03-19 NOTE — PROGRESS NOTES
Health Maintenance Due   Topic Date Due    High Dose Statin  06/09/1965    DEXA SCAN  07/11/2012    Mammogram  06/03/2018     Digital Medicine Outreach.

## 2019-03-20 NOTE — TELEPHONE ENCOUNTER
Talked to patient again today concerning her Emgality. She said she is still not ready to start the medication as she has fallen and broken her pelvis and is recovering. She does not want to start new medication until she is feeling a bit better. Discussed with her that we will hold the medication for now (push activities out 3 weeks) and provided our phone number and hours for her to reach out if she would like to begin earlier. Patient confirmed understanding.

## 2019-03-22 ENCOUNTER — TELEPHONE (OUTPATIENT)
Dept: ORTHOPEDICS | Facility: CLINIC | Age: 74
End: 2019-03-22

## 2019-03-22 DIAGNOSIS — S32.591A INFERIOR PUBIC RAMUS FRACTURE, RIGHT, CLOSED, INITIAL ENCOUNTER: ICD-10-CM

## 2019-03-22 DIAGNOSIS — S32.591D INFERIOR PUBIC RAMUS FRACTURE, RIGHT, WITH ROUTINE HEALING, SUBSEQUENT ENCOUNTER: ICD-10-CM
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
## 2019-03-22 RX ORDER — OXYCODONE AND ACETAMINOPHEN 10; 325 MG/1; MG/1
1 TABLET ORAL EVERY 6 HOURS PRN
Qty: 40 TABLET | Refills: 0 | Status: SHIPPED | OUTPATIENT
Start: 2019-03-22 | End: 2019-04-02 | Stop reason: SDUPTHER

## 2019-03-22 NOTE — TELEPHONE ENCOUNTER
----- Message from Edmundo Angel sent at 3/22/2019  2:46 PM CDT -----  Type:  RX Refill Request    Who Called:  Patient  Refill or New Rx:  Refill  RX Name and Strength:  Zanaflex  Preferred Pharmacy with phone number:    [facility] [place] - [address] [facility]  [address] [facility] [address]  Phone: [phone] Fax: [phone]  Local or Mail Order:  Local  Ordering Provider:  Same  Best Call Back Number:  [phone] (home)

## 2019-03-22 NOTE — TELEPHONE ENCOUNTER
"Contacted pt. Advised prescription has been sent to requested pharmacy. Advised per Arina Diaz NP "Pain medication refilled.  Please alternate doses with over the counter ibuprofen to reduce amount of narcotic intake." Pt verbalized understanding.   "

## 2019-03-22 NOTE — PROGRESS NOTES
Refill Authorization Note     is requesting a refill authorization.    Brief assessment and rationale for refill: Quick DC: rts (4/19)  Name and strength of medication: tiZANidine (ZANAFLEX) 4 MG tablet       Medication Therapy Plan: last escripted to [facility] on 3/7/19 for 40 ds; refill request too soon     Medication reconciliation completed: No                         Comments:

## 2019-03-22 NOTE — TELEPHONE ENCOUNTER
----- Message from Letty Rock sent at 3/22/2019  2:09 PM CDT -----  Contact: self  Patient [phone] is almost out of Hydrocodone 10/325mg and is asking if a prescription could be sent to her pharmacy/please advise      [facility] [place] - [address] [facility] [address]  Phone: [phone] Fax: [phone]

## 2019-03-22 NOTE — TELEPHONE ENCOUNTER
Pain medication refilled.  Please alternate doses with over the counter ibuprofen to reduce amount of narcotic intake.  ctnp

## 2019-03-25 RX ORDER — TIZANIDINE 4 MG/1
4 TABLET ORAL EVERY 8 HOURS PRN
Qty: 60 TABLET | Refills: 1 | OUTPATIENT
Start: 2019-03-25

## 2019-03-27 ENCOUNTER — PATIENT OUTREACH (OUTPATIENT)
Dept: ADMINISTRATIVE | Facility: HOSPITAL | Age: 74
End: 2019-03-27

## 2019-03-27 NOTE — PROGRESS NOTES
Portal outreach un-read by patient.  Outreach mailed today  Health Maintenance Due   Topic Date Due    Aspirin/Antiplatelet Therapy  06/09/1962    High Dose Statin  06/09/1965    DEXA SCAN  07/11/2012    Mammogram  06/03/2018

## 2019-03-27 NOTE — LETTER
March 27, 2019    Dulce Sophia Johnnie Castro  [address]  [place] [address]             [facility]  [address]  [facility] [address]  Phone: [phone] Dear Ms. Castro:    We have tried to reach you by [facility] email unsuccessfully.      [facility] is committed to your overall health.  To help you get the most out of each of your visits, we will review your information to make sure you are up to date on all of your recommended tests and/or procedures.       Erik Jacobsen MD   has found that your chart shows you may be due for the following:     Osteoporosis screening (DEXA SCAN)   Mammogram     If you have had any of the above done at another facility, please bring the records with you or fax them to [phone] so that your record at [facility] will be complete. If you have not had any of these tests or procedures done recently and would like to complete this testing ,  please call [phone] or send a message through your [facility] portal to your provider's office.     If you have an upcoming scheduled appointment for the above test and/or procedures, please disregard this letter.     If you are currently taking medication, please bring it with you to your appointment for review.     Sincerely,    Sharyn House  Clinical Care Coordinator  [facility]

## 2019-03-28 ENCOUNTER — OFFICE VISIT (OUTPATIENT)
Dept: NEUROLOGY | Facility: CLINIC | Age: 74
End: 2019-03-28
Payer: MEDICARE

## 2019-03-28 VITALS
RESPIRATION RATE: 16 BRPM | HEART RATE: 81 BPM | BODY MASS INDEX: 21.51 KG/M2 | SYSTOLIC BLOOD PRESSURE: 161 MMHG | DIASTOLIC BLOOD PRESSURE: 73 MMHG | WEIGHT: 126 LBS | HEIGHT: 64 IN

## 2019-03-28 DIAGNOSIS — M54.81 BILATERAL OCCIPITAL NEURALGIA: ICD-10-CM

## 2019-03-28 DIAGNOSIS — G56.03 CARPAL TUNNEL SYNDROME ON BOTH SIDES: ICD-10-CM

## 2019-03-28 DIAGNOSIS — I27.29 PULMONARY HYPERTENSIVE VENOUS DISEASE: ICD-10-CM

## 2019-03-28 DIAGNOSIS — M62.838 CERVICAL PARASPINAL MUSCLE SPASM: ICD-10-CM

## 2019-03-28 DIAGNOSIS — Z79.01 ANTICOAGULATED ON COUMADIN: ICD-10-CM

## 2019-03-28 DIAGNOSIS — E53.8 B12 DEFICIENCY: ICD-10-CM

## 2019-03-28 DIAGNOSIS — F31.9 BIPOLAR AFFECTIVE DISORDER, REMISSION STATUS UNSPECIFIED: ICD-10-CM

## 2019-03-28 DIAGNOSIS — M79.18 MYOFASCIAL PAIN SYNDROME, CERVICAL: ICD-10-CM

## 2019-03-28 DIAGNOSIS — Z99.81 HYPOXEMIA REQUIRING SUPPLEMENTAL OXYGEN: ICD-10-CM

## 2019-03-28 DIAGNOSIS — R09.02 HYPOXEMIA REQUIRING SUPPLEMENTAL OXYGEN: ICD-10-CM

## 2019-03-28 DIAGNOSIS — M79.2 NEURALGIA: ICD-10-CM

## 2019-03-28 DIAGNOSIS — G43.719 INTRACTABLE CHRONIC MIGRAINE WITHOUT AURA AND WITHOUT STATUS MIGRAINOSUS: Primary | ICD-10-CM

## 2019-03-28 DIAGNOSIS — Z98.890 S/P MVR (MITRAL VALVE REPAIR): ICD-10-CM

## 2019-03-28 DIAGNOSIS — R29.6 FALLS FREQUENTLY: ICD-10-CM

## 2019-03-28 DIAGNOSIS — Z98.84 GASTRIC BYPASS STATUS FOR OBESITY: ICD-10-CM

## 2019-03-28 DIAGNOSIS — D50.9 IRON DEFICIENCY ANEMIA, UNSPECIFIED IRON DEFICIENCY ANEMIA TYPE: ICD-10-CM

## 2019-03-28 PROCEDURE — 3078F PR MOST RECENT DIASTOLIC BLOOD PRESSURE < 80 MM HG: ICD-10-PCS | Mod: HCNC,CPTII,S$GLB, | Performed by: PSYCHIATRY & NEUROLOGY

## 2019-03-28 PROCEDURE — 1100F PTFALLS ASSESS-DOCD GE2>/YR: CPT | Mod: HCNC,CPTII,S$GLB, | Performed by: PSYCHIATRY & NEUROLOGY

## 2019-03-28 PROCEDURE — 3077F SYST BP >= 140 MM HG: CPT | Mod: HCNC,CPTII,S$GLB, | Performed by: PSYCHIATRY & NEUROLOGY

## 2019-03-28 PROCEDURE — 99499 UNLISTED E&M SERVICE: CPT | Mod: S$GLB,,, | Performed by: PSYCHIATRY & NEUROLOGY

## 2019-03-28 PROCEDURE — 3288F FALL RISK ASSESSMENT DOCD: CPT | Mod: HCNC,CPTII,S$GLB, | Performed by: PSYCHIATRY & NEUROLOGY

## 2019-03-28 PROCEDURE — 1100F PR PT FALLS ASSESS DOC 2+ FALLS/FALL W/INJURY/YR: ICD-10-PCS | Mod: HCNC,CPTII,S$GLB, | Performed by: PSYCHIATRY & NEUROLOGY

## 2019-03-28 PROCEDURE — 3288F PR FALLS RISK ASSESSMENT DOCUMENTED: ICD-10-PCS | Mod: HCNC,CPTII,S$GLB, | Performed by: PSYCHIATRY & NEUROLOGY

## 2019-03-28 PROCEDURE — 3077F PR MOST RECENT SYSTOLIC BLOOD PRESSURE >= 140 MM HG: ICD-10-PCS | Mod: HCNC,CPTII,S$GLB, | Performed by: PSYCHIATRY & NEUROLOGY

## 2019-03-28 PROCEDURE — 99999 PR PBB SHADOW E&M-EST. PATIENT-LVL III: CPT | Mod: PBBFAC,HCNC,, | Performed by: PSYCHIATRY & NEUROLOGY

## 2019-03-28 PROCEDURE — 99214 PR OFFICE/OUTPT VISIT, EST, LEVL IV, 30-39 MIN: ICD-10-PCS | Mod: HCNC,S$GLB,, | Performed by: PSYCHIATRY & NEUROLOGY

## 2019-03-28 PROCEDURE — 99214 OFFICE O/P EST MOD 30 MIN: CPT | Mod: HCNC,S$GLB,, | Performed by: PSYCHIATRY & NEUROLOGY

## 2019-03-28 PROCEDURE — 99499 RISK ADDL DX/OHS AUDIT: ICD-10-PCS | Mod: S$GLB,,, | Performed by: PSYCHIATRY & NEUROLOGY

## 2019-03-28 PROCEDURE — 99999 PR PBB SHADOW E&M-EST. PATIENT-LVL III: ICD-10-PCS | Mod: PBBFAC,HCNC,, | Performed by: PSYCHIATRY & NEUROLOGY

## 2019-03-28 PROCEDURE — 3078F DIAST BP <80 MM HG: CPT | Mod: HCNC,CPTII,S$GLB, | Performed by: PSYCHIATRY & NEUROLOGY

## 2019-03-28 RX ORDER — CYCLOSPORINE 0.5 MG/ML
1 EMULSION OPHTHALMIC DAILY
Refills: 6 | COMMUNITY
Start: 2019-03-20

## 2019-03-28 NOTE — PROGRESS NOTES
Subjective:       Patient ID: Dulce Castro is a 72 y.o. female.    Chief Complaint: Headache  INTERVAL HISTORY  The patient comes for follow up. Her headaches are under control on Botox, after her recent fall resulting in nondisplaced inferior pubic ramus fracture her headaches are still resolved    HPI   The patient is a pleasant 71 y/o female presenting with chief complain of headache. They started last August 2016, they are now daily and quite disabling. They are located above the eyes. While she suffers from dry eyes, the ophthalmologist told her that this is not the cause of her headaches. Likewise, the ENT has excluded sinus pathology responsible for the headaches. She has had 3 CT of the head, two in August 2016 and one in December 2016, all of which are negative. She is anemic and low in iron requiring infusions from time to time. Her medical history is quite complicated because of cardiopulmonary co morbidity. She suffers with Pulmonary Hypertension, she is oxygen dependent and is status post mitral valve prolapse and repair. In addition she has an implanted insertional dual pacemaker. She has been on Tegretol, Desyrel and Xanax for a long time for Bipolar Disorder. In addition, the patient was in a coma for 3 days, unknown origin. She is status post bariatric surgery going from 220 lbs to 120 lbs.  Please see details of headache characteristics headache below.    Headache questionnaire     1. When did your Headaches start?       August 2016      2. Where are your headaches located?                        Around and above the eyes        3. Your headache's characteristics:                        Pressure, Throbbing, Pounding     4. How long does the headache last?                        Days        5. How often does the headache occur?                        daily        6. Are your headaches preceded or accompanied by other symptoms? no                        If yes, please describe.  N/A        7.  Does the headache awaken you at night? no                        If so, how often? N/A           8. Please jeremias the word that best describes your headache's intensity:                         severe        9. Using a scale of 1 through 10, with 0 = no pain and 10 = the worst pain:                        What score is your headache now? 8                        What score is your headache at its worst? 10                        What score is your headache at its best? 5           10. Possible associated headache symptoms:  [x]  Sensitivity to light                                      [x] Dizziness                                        [] Nasal or sinus pressure/ pain                        [x] Sensitivity to noise                                     [] Vertigo                                            [] Problems with concentration  [] Sensitivity to smells             [x] Ringing in ears                     [] Problems with memory                                            [x] Blurred vision                                 [] Irritability                                         [] Problems with task completion   [] Double vision                                 [] Anger                                  [x]  Problems with relaxation  [] Loss of appetite                                         [x] Anxiety                                           [x] Neck tightness, Neck pain  [x] Nausea                                                                 [] Nasal congestion  [] Vomiting                                                                       11. Headache improving factors:  [x] Sleep                                  [] Heat  [x] Darkness                                        [] Ice  [] Local pressure                     [] Menses (period)  [] Massage                                        [] Medications:          12. Headache worsening factors:   [x] Fatigue                     [] Sneezing                                         [] Changes in Weather  [x] Light             [] Bending Over           [x] Stress  [x] Noise            [] Ovulation                                        [] Multiple Sclerosis Flare-Up  [] Smells                      [] Menses                                          [] Food   [] Coughing                  [] Alcohol        13. Number of caffeinated drinks per day: 1        14. Number of diet drinks per day:  4        15. Have you seen any other [facility]ists within the last 3 years?  no         Please Check any Medications Tried for Headache     AED Neuromodulators   MAOIs   Ergot Alkaloids     Acetazolamide (Diamox) [] Phenelzine (Nardil) [] Dihydroergotamine (Migranal) []   Carbamazepine (Tegretol) [x] Tranylcypromine (Parnate) [] Ergotamine (Ergomar) []   Gabapentin (Neurontin) [] Antihistamine/Serotonergic   Triptans     Lacosamide (Vimpat) [] Cyproheptadine (Periactin) [] Almotriptan (Axert) []   Lamotrigine (Lamictal) [] Antihypertensives   Eletriptan (Relpax) []   Levatiracetam (Keppra) [] Atenolol (Tenormin) [] Frovatriptan (Frova) []   Oxcarbazepine (Trileptal) [] Bisoprostol (Zebeta) [] Naratriptan (Amerge) []   Phenobarbital [] Candesartan (Atacand) [] Rizatriptan (Maxalt) []   Phenytoin (Dilantin) [] Diltiazem (Cardizem) [] Sumatriptan (Imitrex) []   Pregabalin (Lyrica) [] Lisinopril (Prinivil, Zestril) [] Zolmitriptan (Zomig) []   Primidone (Mysoline) [] Metoprolol (Toprol) [] Combo Abortives     Tiagabine (Gabatril) [] Nadolol (Corgard) [] Butalbital and Acetaminophen (Bupap) []   Topiramate (Topamax)  (Trokendi) [] Nicardipine (Cardene) []     Vigabatrin (Sabril) [] Nimodipine (Nimotop) [] Butalbital, Acetaminophen, and caffeine (Fioricet) []   Valproic Acid (Depakote) (Divalproex Sodium) [] Propranolol (Inderal) []     Zonisamide (Zonegran) [] Telmisartan (Micardis) [] Butalbital, Aspirin, and caffeine (Fiorinal) []   Benzodiazepines   Timolol (Blocadren) []      Alprazolam (Xanax) [] Verapamil (Calan, Verelan) [] Butalbital, Caffeine, Acetaminophen, and Codeine (Fioricet with Codeine) []   Diazepam (Valium) [] NSAIDs       Lorazepam (Ativan) [] Acetaminophen (Tylenol) [x]     Clonazepam (Klonopin) [] Acetylsalicylic Acid (Aspirin) [] Butalbital, Caffeine, Aspirin, and Codeine  (Fiorinal with Codeine) []   Antidepressants   Diclofenac (Cambia) []     Amitriptyline (Elavil) [] Ibuprofen (Motrin) [x]     Desipramine (Norpramin) [] Indomethacin (Indocin) [] Aspirin, Caffeine, and Acetaminophen (Excedrin) (Goodys) [x]   Doxepin (Sinequan) [] Ketoprofen (Orudis) []     Fluoxetine (Prozac) [] Ketorolac (Toradol) [] Acetaminophen, Dichloralphenazone, and Isometheptene (Midrin) []   Imipramine (Tofranil) [] Naproxen (Anaprox) (Aleve) [x]     Nortriptyline (Pamelor) [] Meclofenamic Acid (Meclomen) []     Venlafaxine (Effexor) [x] Meloxicam (Mobic) [] Aspirin, Salicylamide, and Caffeine (BC Powder) [x]                           Review of Systems   Constitutional: Positive for fatigue. Negative for activity change, appetite change and fever.   HENT: Positive for tinnitus. Negative for congestion, dental problem, hearing loss, sinus pressure, trouble swallowing and voice change.    Eyes: Positive for visual disturbance. Negative for photophobia, pain and redness.   Respiratory: Negative for cough, chest tightness and shortness of breath.    Cardiovascular: Negative for chest pain, palpitations and leg swelling.   Gastrointestinal: Negative for abdominal pain, blood in stool, nausea and vomiting.   Endocrine: Negative for cold intolerance and heat intolerance.   Genitourinary: Negative for difficulty urinating, frequency, menstrual problem and urgency.   Musculoskeletal: Positive for arthralgias, myalgias and neck pain. Negative for back pain, gait problem, joint swelling and neck stiffness.   Skin: Negative.    Neurological: Positive for weakness and headaches. Negative for dizziness,  tremors, seizures, syncope, facial asymmetry, speech difficulty, light-headedness and numbness.   Hematological: Negative for adenopathy. Does not bruise/bleed easily.   Psychiatric/Behavioral: Negative for agitation, behavioral problems, confusion, decreased concentration, self-injury, sleep disturbance and suicidal ideas. The patient is not nervous/anxious and is not hyperactive.          Past Medical History:   Diagnosis Date    Allergy     Anemia     Anticoagulant long-term use     Anxiety     Arthritis     Bipolar disorder     Blood transfusion     Carpal tunnel syndrome     Cataract     CHF (congestive heart failure)     Depression     VEE (dyspnea on exertion)     general activity    GERD (gastroesophageal reflux disease)     Heart murmur     Hypertension     Irritable bladder     Meningitis     Mitral valve prolapse     On home O2     @ 3 liters    TEE (obstructive sleep apnea)     Pulmonary HTN     Thyroid disease      Past Surgical History:   Procedure Laterality Date     bladder stimulator      [employer]    ABDOMINAL SURGERY      APPENDECTOMY      CARDIAC PACEMAKER PLACEMENT  10/2016    CARDIAC SURGERY  2007    MVR    CARPAL TUNNEL RELEASE Right     CHOLECYSTECTOMY  2010    COLONOSCOPY N/A 2/17/2017    Procedure: COLONOSCOPY;  Surgeon: Artie Mills Jr., MD;  Location: [facility];  Service: Endoscopy;  Laterality: N/A;    ESOPHAGOGASTRODUODENOSCOPY  09/14/2011    BEAU.   Normal esophagus.  NERD.  Gastric bypass.  Normal anastomosis.  Normal jejunum.    EYE SURGERY Bilateral     cataracts    FOOT SURGERY Right     2 Hematomas on nerves    GASTRIC BYPASS  2004    HYSTERECTOMY      separate procedures for uterus, ovaries and then an ex-lap for adhesions    open heart surgery      mitral valve repair     Family History   Problem Relation Age of Onset    Heart disease Mother     Heart disease Father     Heart disease Brother     Collagen disease Neg Hx      Social  History     Social History    Marital status:      Spouse name: N/A    Number of children: N/A    Years of education: N/A     Occupational History    Not on file.     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     Review of patient's allergies indicates:   Allergen Reactions    Morphine Other (See Comments)     Difficulty swallowing    Tramadol Other (See Comments)     itching     Current Outpatient Medications   Medication Sig    alprazolam (XANAX) 1 MG tablet Take 1 mg by mouth 4 (four) times daily as needed.    aspirin-acetaminophen-caffeine 250-250-65 mg (EXCEDRIN MIGRAINE) 250-250-65 mg per tablet Take 2 tablets by mouth every 6 (six) hours as needed for Pain.    carbamazepine (TEGRETOL) 200 mg tablet Take 200 mg by mouth 2 (two) times daily.    CYANOCOBALAMIN/COBAMAMIDE (B12 SL) Place under the tongue.    diphenhydrAMINE (BENADRYL) 25 mg capsule Take 1 each (25 mg total) by mouth daily as needed for Itching.    furosemide (LASIX) 40 MG tablet Take 1 tablet (40 mg total) by mouth Every 3 (three) days. Hold if sbp < 100    gabapentin (NEURONTIN) 300 MG capsule Take 1 capsule (300 mg total) by mouth 3 (three) times daily.    levothyroxine (SYNTHROID) 112 MCG tablet TAKE 1 TABLET (112 MCG TOTAL) BY MOUTH ONCE DAILY.    metOLazone (ZAROXOLYN) 5 MG tablet     oxyCODONE-acetaminophen (PERCOCET)  mg per tablet Take 1 tablet by mouth every 4 (four) hours as needed for Pain.    oxyCODONE-acetaminophen (PERCOCET)  mg per tablet Take 1 tablet by mouth every 6 (six) hours as needed for Pain.  Alternate with ibuprofen to reduce amount of narcotic intake    OXYGEN-AIR DELIVERY SYSTEMS MISC 3 L by Nasal route Daily. uses hs and prn for SOB    potassium chloride SA (K-DUR,KLOR-CON) 20 MEQ tablet Take 2 tablet every third day with furosemide and 1 tablet once a day every other day.    prochlorperazine (COMPAZINE) 25 MG suppository INSERT 1 SUPPOSITORY PER RECTUM EVERY 12 HOURS AS NEEDED FOR NAUSEA    RESTASIS 0.05 % ophthalmic emulsion     tiZANidine (ZANAFLEX) 4 MG tablet Take 1 tablet (4 mg total) by mouth every 8 (eight) hours as needed.    trazodone (DESYREL) 300 MG tablet Take 0.5 tablets (150 mg total) by mouth nightly as needed for Insomnia. (Patient taking differently: Take 300 mg by mouth nightly. )    venlafaxine (EFFEXOR-XR) 75 MG 24 hr capsule Take 225 mg by mouth once daily.      Current Facility-Administered Medications   Medication    onabotulinumtoxina injection 200 Units    onabotulinumtoxina injection 200 Units    onabotulinumtoxina injection 200 Units    onabotulinumtoxina injection 200 Units           Objective:      Vitals:    03/28/19 1527   BP: (!) 161/73   Pulse: 81   Resp: 16     Body mass index is 21.63 kg/m².    Physical Exam    Constitutional:   She appears well-developed, looks frail. She is well groomed    HENT:    Head: Atraumatic, oral and nasal mucosa intact  Eyes: Conjunctivae slightly hyperemic, EOM are normal. Pupils are equal, round, and reactive to light OU  Neck: Neck supple. No thyromegaly present  Cardiovascular: Normal rate and normal heart sounds  No murmur heard  Pulmonary/Chest: Effort normal and breath sounds normal  Skin: Skin is warm and dry  Psychiatric: Normal mood and affect     Neuro exam:    Mental status:  Awake, attentive, Alert, oriented to self, place, year and month  Language function is intactt      Cranial Nerves:  Smell was not formally evaluated  Cranial Nerves II - XII: intact  Pursuits were smooth, normal saccades, no nystagmus  OU  Funduscopic exam - disc were flat and pink, no exudates or hemorrhages OU  Motor - facial movement was symmetrical and normal     Palate moved well and was symmetrical with normal palatal and oral sensation  Tongue movements were full    Coordination:     Rapid alternating movements and rapid finger tapping - normal bilaterally  Finger to nose - normal and symmetric bilaterally   Arm roll - smooth and symmetric   No intentional or positional tremor.     Motor:  Normal muscle bulk and symmetry. No fasciculations were noted    No pronator drift  Strength 5/5 bilaterally     Reflexes:  Tendon reflexes were 2 + at biceps, triceps, brachioradialis, patellar, and Achilles bilaterally  On plantar stimulation toes were down going bilaterally  No clonus was noted     Sensory: Intact to light touch, pin prick in all extremities.     Gait: Romberg absent. Slow pace, mild stooped posture. Normal arm swing and turns.     Review of Data: Head CT x 3 all negative. Iron deficiency anemia. Abnormal ECHO        Assessment and Plan   Chronic Intractable Migraine responding well to Botox. Very complicated case due to underlying co morbidities which influence therapeutic decisions.    Recent fall resulting in nondisplaced inferior pubic ramus fracture, managed conservatively. She is on opioids with total resolution of her headaches while on the opioids  Pulmonary Hypertension  Oxygen dependent  Chronic Warfarin Therapy  Status post Bariatric surgery  Multiple other co morbidities as listed in the problem list    Hold Botox for now as she is in remission. Add to clinic for repeat treatment if headache recurs    Counseling:  More than 50% of the 25 minute encounter was spent face to face counseling the patient regarding current status and future plan of care as well as side of the medications. All questions were answered to patient's satisfaction    Yousif Nelson M.D  Medical Director, Headache and Facial Pain  [facility]

## 2019-04-02 ENCOUNTER — OFFICE VISIT (OUTPATIENT)
Dept: FAMILY MEDICINE | Facility: CLINIC | Age: 74
End: 2019-04-02
Payer: MEDICARE

## 2019-04-02 VITALS
TEMPERATURE: 99 F | OXYGEN SATURATION: 98 % | WEIGHT: 123.88 LBS | SYSTOLIC BLOOD PRESSURE: 138 MMHG | DIASTOLIC BLOOD PRESSURE: 84 MMHG | HEIGHT: 64 IN | BODY MASS INDEX: 21.15 KG/M2 | HEART RATE: 69 BPM

## 2019-04-02 DIAGNOSIS — S32.591D INFERIOR PUBIC RAMUS FRACTURE, RIGHT, WITH ROUTINE HEALING, SUBSEQUENT ENCOUNTER: ICD-10-CM

## 2019-04-02 PROCEDURE — 99214 PR OFFICE/OUTPT VISIT, EST, LEVL IV, 30-39 MIN: ICD-10-PCS | Mod: HCNC,S$GLB,, | Performed by: FAMILY MEDICINE

## 2019-04-02 PROCEDURE — 1101F PT FALLS ASSESS-DOCD LE1/YR: CPT | Mod: HCNC,CPTII,S$GLB, | Performed by: FAMILY MEDICINE

## 2019-04-02 PROCEDURE — 3079F DIAST BP 80-89 MM HG: CPT | Mod: HCNC,CPTII,S$GLB, | Performed by: FAMILY MEDICINE

## 2019-04-02 PROCEDURE — 99999 PR PBB SHADOW E&M-EST. PATIENT-LVL III: CPT | Mod: PBBFAC,HCNC,, | Performed by: FAMILY MEDICINE

## 2019-04-02 PROCEDURE — 3075F PR MOST RECENT SYSTOLIC BLOOD PRESS GE 130-139MM HG: ICD-10-PCS | Mod: HCNC,CPTII,S$GLB, | Performed by: FAMILY MEDICINE

## 2019-04-02 PROCEDURE — 1101F PR PT FALLS ASSESS DOC 0-1 FALLS W/OUT INJ PAST YR: ICD-10-PCS | Mod: HCNC,CPTII,S$GLB, | Performed by: FAMILY MEDICINE

## 2019-04-02 PROCEDURE — 3079F PR MOST RECENT DIASTOLIC BLOOD PRESSURE 80-89 MM HG: ICD-10-PCS | Mod: HCNC,CPTII,S$GLB, | Performed by: FAMILY MEDICINE

## 2019-04-02 PROCEDURE — 99214 OFFICE O/P EST MOD 30 MIN: CPT | Mod: HCNC,S$GLB,, | Performed by: FAMILY MEDICINE

## 2019-04-02 PROCEDURE — 99999 PR PBB SHADOW E&M-EST. PATIENT-LVL III: ICD-10-PCS | Mod: PBBFAC,HCNC,, | Performed by: FAMILY MEDICINE

## 2019-04-02 PROCEDURE — 3075F SYST BP GE 130 - 139MM HG: CPT | Mod: HCNC,CPTII,S$GLB, | Performed by: FAMILY MEDICINE

## 2019-04-02 RX ORDER — TIZANIDINE 4 MG/1
4 TABLET ORAL EVERY 8 HOURS PRN
Qty: 60 TABLET | Refills: 1 | Status: ON HOLD | OUTPATIENT
Start: 2019-04-02 | End: 2019-04-19 | Stop reason: HOSPADM

## 2019-04-02 RX ORDER — OXYCODONE AND ACETAMINOPHEN 10; 325 MG/1; MG/1
1 TABLET ORAL 3 TIMES DAILY PRN
Qty: 30 TABLET | Refills: 0 | Status: SHIPPED | OUTPATIENT
Start: 2019-04-02 | End: 2019-04-06

## 2019-04-02 NOTE — PROGRESS NOTES
Subjective:       Patient ID: Dulce Castro is a 73 y.o. female.    Chief Complaint: Hip Injury (4 week follow up)    HPI     Here for a f/u.     S/p fall on right hip on 2/22/2019 and sustained inferior pubic ramus fx. Receiving home health physio. Taking percocet 10 and zanaflex for pain.           Review of Systems      Review of Systems   Constitutional: Negative for fever and chills.   HENT: Negative for hearing loss and neck stiffness.    Eyes: Negative for redness and itching.   Respiratory: Negative for cough and choking.    Cardiovascular: Negative for chest pain and leg swelling.  Abdomen: Negative for abdominal pain and blood in stool.   Genitourinary: Negative for dysuria and flank pain.   Musculoskeletal: Negative for back pain  Neurological: Negative for light-headedness and headaches.   Hematological: Negative for adenopathy.   Psychiatric/Behavioral: Negative for behavioral problems.           Objective:      Physical Exam   Constitutional: She appears well-developed.   HENT:   Head: Normocephalic and atraumatic.   Eyes: Pupils are equal, round, and reactive to light. Conjunctivae are normal.   Neck: Normal range of motion.   Cardiovascular: Normal rate and regular rhythm.   No murmur heard.  Pulmonary/Chest: Effort normal and breath sounds normal.   Lymphadenopathy:     She has no cervical adenopathy.       Assessment:       1. Inferior pubic ramus fracture, right, with routine healing, subsequent encounter        Plan:       Inferior pubic ramus fracture, right, with routine healing, subsequent encounter  -     oxyCODONE-acetaminophen (PERCOCET)  mg per tablet; Take 1 tablet by mouth 3 (three) times daily as needed for Pain. Alternate with ibuprofen to reduce amount of narcotic intake  Dispense: 30 tablet; Refill: 0    Other orders  -     tiZANidine (ZANAFLEX) 4 MG tablet; Take 1 tablet (4 mg total) by mouth every 8 (eight) hours as needed.  Dispense: 60 tablet; Refill: 1           Plan:  rf percocet 10 and zanaflex  Cont all other meds    Total time spent with patient was 25 minutes with more than half the time spent in direct consultation with the patient in regards to our treatment/plan.      Medication List with Changes/Refills   Current Medications    ALPRAZOLAM (XANAX) 1 MG TABLET    Take 1 mg by mouth 4 (four) times daily as needed.    ASPIRIN-ACETAMINOPHEN-CAFFEINE 250-250-65 MG (EXCEDRIN MIGRAINE) 250-250-65 MG PER TABLET    Take 2 tablets by mouth every 6 (six) hours as needed for Pain.    CARBAMAZEPINE (TEGRETOL) 200 MG TABLET    Take 200 mg by mouth 2 (two) times daily.    CYANOCOBALAMIN/COBAMAMIDE (B12 SL)    Place under the tongue.    DIPHENHYDRAMINE (BENADRYL) 25 MG CAPSULE    Take 1 each (25 mg total) by mouth daily as needed for Itching.    FUROSEMIDE (LASIX) 40 MG TABLET    Take 1 tablet (40 mg total) by mouth Every 3 (three) days. Hold if sbp < 100    GABAPENTIN (NEURONTIN) 300 MG CAPSULE    Take 1 capsule (300 mg total) by mouth 3 (three) times daily.    LEVOTHYROXINE (SYNTHROID) 112 MCG TABLET    TAKE 1 TABLET (112 MCG TOTAL) BY MOUTH ONCE DAILY.    METOLAZONE (ZAROXOLYN) 5 MG TABLET        OXYCODONE-ACETAMINOPHEN (PERCOCET)  MG PER TABLET    Take 1 tablet by mouth every 4 (four) hours as needed for Pain.    OXYGEN-AIR DELIVERY SYSTEMS MISC    3 L by Nasal route Daily. uses hs and prn for SOB    POTASSIUM CHLORIDE SA (K-DUR,KLOR-CON) 20 MEQ TABLET    Take 2 tablet every third day with furosemide and 1 tablet once a day every other day.    PROCHLORPERAZINE (COMPAZINE) 25 MG SUPPOSITORY    INSERT 1 SUPPOSITORY PER RECTUM EVERY 12 HOURS AS NEEDED FOR NAUSEA    RESTASIS 0.05 % OPHTHALMIC EMULSION        TRAZODONE (DESYREL) 300 MG TABLET    Take 0.5 tablets (150 mg total) by mouth nightly as needed for Insomnia.    VENLAFAXINE (EFFEXOR-XR) 75 MG 24 HR CAPSULE    Take 225 mg by mouth once daily.    Changed and/or Refilled Medications    Modified Medication Previous  Medication    OXYCODONE-ACETAMINOPHEN (PERCOCET)  MG PER TABLET oxyCODONE-acetaminophen (PERCOCET)  mg per tablet       Take 1 tablet by mouth 3 (three) times daily as needed for Pain. Alternate with ibuprofen to reduce amount of narcotic intake    Take 1 tablet by mouth every 6 (six) hours as needed for Pain. Alternate with ibuprofen to reduce amount of narcotic intake    TIZANIDINE (ZANAFLEX) 4 MG TABLET tiZANidine (ZANAFLEX) 4 MG tablet       Take 1 tablet (4 mg total) by mouth every 8 (eight) hours as needed.    Take 1 tablet (4 mg total) by mouth every 8 (eight) hours as needed.

## 2019-04-03 ENCOUNTER — OFFICE VISIT (OUTPATIENT)
Dept: CARDIOLOGY | Facility: CLINIC | Age: 74
End: 2019-04-03
Payer: MEDICARE

## 2019-04-03 VITALS
WEIGHT: 126.75 LBS | BODY MASS INDEX: 21.12 KG/M2 | DIASTOLIC BLOOD PRESSURE: 69 MMHG | HEIGHT: 65 IN | HEART RATE: 68 BPM | SYSTOLIC BLOOD PRESSURE: 148 MMHG

## 2019-04-03 DIAGNOSIS — Z98.890 S/P MVR (MITRAL VALVE REPAIR): ICD-10-CM

## 2019-04-03 DIAGNOSIS — I95.1 ORTHOSTATIC HYPOTENSION: ICD-10-CM

## 2019-04-03 DIAGNOSIS — I34.1 MITRAL VALVE PROLAPSE: ICD-10-CM

## 2019-04-03 DIAGNOSIS — I25.10 CORONARY ARTERY DISEASE INVOLVING NATIVE CORONARY ARTERY OF NATIVE HEART WITHOUT ANGINA PECTORIS: Primary | ICD-10-CM

## 2019-04-03 DIAGNOSIS — I27.20 PULMONARY HYPERTENSION: ICD-10-CM

## 2019-04-03 PROCEDURE — 99214 OFFICE O/P EST MOD 30 MIN: CPT | Mod: HCNC,S$GLB,, | Performed by: INTERNAL MEDICINE

## 2019-04-03 PROCEDURE — 99214 PR OFFICE/OUTPT VISIT, EST, LEVL IV, 30-39 MIN: ICD-10-PCS | Mod: HCNC,S$GLB,, | Performed by: INTERNAL MEDICINE

## 2019-04-03 PROCEDURE — 3077F PR MOST RECENT SYSTOLIC BLOOD PRESSURE >= 140 MM HG: ICD-10-PCS | Mod: HCNC,CPTII,S$GLB, | Performed by: INTERNAL MEDICINE

## 2019-04-03 PROCEDURE — 3078F DIAST BP <80 MM HG: CPT | Mod: HCNC,CPTII,S$GLB, | Performed by: INTERNAL MEDICINE

## 2019-04-03 PROCEDURE — 3288F FALL RISK ASSESSMENT DOCD: CPT | Mod: HCNC,CPTII,S$GLB, | Performed by: INTERNAL MEDICINE

## 2019-04-03 PROCEDURE — 99999 PR PBB SHADOW E&M-EST. PATIENT-LVL III: ICD-10-PCS | Mod: PBBFAC,HCNC,, | Performed by: INTERNAL MEDICINE

## 2019-04-03 PROCEDURE — 1100F PR PT FALLS ASSESS DOC 2+ FALLS/FALL W/INJURY/YR: ICD-10-PCS | Mod: HCNC,CPTII,S$GLB, | Performed by: INTERNAL MEDICINE

## 2019-04-03 PROCEDURE — 1100F PTFALLS ASSESS-DOCD GE2>/YR: CPT | Mod: HCNC,CPTII,S$GLB, | Performed by: INTERNAL MEDICINE

## 2019-04-03 PROCEDURE — 3078F PR MOST RECENT DIASTOLIC BLOOD PRESSURE < 80 MM HG: ICD-10-PCS | Mod: HCNC,CPTII,S$GLB, | Performed by: INTERNAL MEDICINE

## 2019-04-03 PROCEDURE — 3077F SYST BP >= 140 MM HG: CPT | Mod: HCNC,CPTII,S$GLB, | Performed by: INTERNAL MEDICINE

## 2019-04-03 PROCEDURE — 99999 PR PBB SHADOW E&M-EST. PATIENT-LVL III: CPT | Mod: PBBFAC,HCNC,, | Performed by: INTERNAL MEDICINE

## 2019-04-03 PROCEDURE — 3288F PR FALLS RISK ASSESSMENT DOCUMENTED: ICD-10-PCS | Mod: HCNC,CPTII,S$GLB, | Performed by: INTERNAL MEDICINE

## 2019-04-03 NOTE — PROGRESS NOTES
Subjective:    Patient ID:  Dulce Castro is a 73 y.o. female who presents for follow-up of MVR    HPI  She comes with no complaints, no chest pain, no shortness of breath  Had a fall last February  Heart wise no issues    Review of Systems   Constitution: Negative for decreased appetite, malaise/fatigue, weight gain and weight loss.   Cardiovascular: Negative for chest pain, dyspnea on exertion, leg swelling, palpitations and syncope.   Respiratory: Negative for cough and shortness of breath.    Gastrointestinal: Negative.    Neurological: Negative for weakness.   All other systems reviewed and are negative.       Objective:      Physical Exam   Constitutional: She is oriented to person, place, and time. She appears well-developed and well-nourished.   HENT:   Head: Normocephalic.   Eyes: Pupils are equal, round, and reactive to light.   Neck: Normal range of motion. Neck supple. No JVD present. Carotid bruit is not present. No thyromegaly present.   Cardiovascular: Normal rate, regular rhythm, normal heart sounds, intact distal pulses and normal pulses. PMI is not displaced. Exam reveals no gallop.   No murmur heard.  Pulmonary/Chest: Effort normal and breath sounds normal.   Abdominal: Soft. Normal appearance. She exhibits no mass. There is no hepatosplenomegaly. There is no tenderness.   Musculoskeletal: Normal range of motion. She exhibits no edema.   Neurological: She is alert and oriented to person, place, and time. She has normal strength and normal reflexes. No sensory deficit.   Skin: Skin is warm and intact.   Psychiatric: She has a normal mood and affect.   Nursing note and vitals reviewed.        Assessment:       1. Coronary artery disease involving native coronary artery of native heart without angina pectoris    2. Pulmonary hypertension    3. Orthostatic hypotension    4. Mitral valve prolapse    5. S/P MVR (mitral valve repair)         Plan:     Continue all cardiac medications  6 m  f/u

## 2019-04-09 ENCOUNTER — TELEPHONE (OUTPATIENT)
Dept: ORTHOPEDICS | Facility: CLINIC | Age: 74
End: 2019-04-09

## 2019-04-10 ENCOUNTER — TELEPHONE (OUTPATIENT)
Dept: FAMILY MEDICINE | Facility: CLINIC | Age: 74
End: 2019-04-10

## 2019-04-10 ENCOUNTER — TELEPHONE (OUTPATIENT)
Dept: ORTHOPEDICS | Facility: CLINIC | Age: 74
End: 2019-04-10

## 2019-04-10 DIAGNOSIS — S32.591D INFERIOR PUBIC RAMUS FRACTURE, RIGHT, WITH ROUTINE HEALING, SUBSEQUENT ENCOUNTER: Primary | ICD-10-CM
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
## 2019-04-10 RX ORDER — OXYCODONE AND ACETAMINOPHEN 10; 325 MG/1; MG/1
1 TABLET ORAL EVERY 6 HOURS PRN
Qty: 30 TABLET | Refills: 0 | Status: SHIPPED | OUTPATIENT
Start: 2019-04-10 | End: 2019-04-10 | Stop reason: SDUPTHER

## 2019-04-10 RX ORDER — OXYCODONE AND ACETAMINOPHEN 10; 325 MG/1; MG/1
1 TABLET ORAL EVERY 6 HOURS PRN
Qty: 30 TABLET | Refills: 0 | Status: ON HOLD | OUTPATIENT
Start: 2019-04-10 | End: 2019-04-29 | Stop reason: HOSPADM

## 2019-04-10 NOTE — TELEPHONE ENCOUNTER
Pt in lobby for apt with NP Arina Diaz. Pt has to reschedule due to Mrs. Diaz having an emergency. Pt states she is in extreme pain and needs something called in ASAP for the pain. Pt currently taking percocet has 2 pills left states she needs a refill to last her until Monday when she can be seen again in clinic at 9:30 am. Please sent medication to [facility] if appropriate

## 2019-04-10 NOTE — TELEPHONE ENCOUNTER
Pt arrived at [facility] this morning. Pt requested refill to last til she can be seen at our office next week. Pt states she only has 2 tablets left.

## 2019-04-10 NOTE — TELEPHONE ENCOUNTER
----- Message from Arina Santana sent at 4/10/2019  9:04 AM CDT -----   Patient would like to reschedule orthopedics post op appointment.

## 2019-04-10 NOTE — TELEPHONE ENCOUNTER
Attempted to contact pt. No answer. Left detailed message advising appointment today would need to be rescheduled as Arina Diaz NP is out of office due to family emergency. Requested a call back to reschedule appointment.

## 2019-04-12 ENCOUNTER — TELEPHONE (OUTPATIENT)
Dept: PHARMACY | Facility: CLINIC | Age: 74
End: 2019-04-12

## 2019-04-13 PROBLEM — Z79.899 POLYPHARMACY: Status: ACTIVE | Noted: 2019-04-13

## 2019-04-13 PROBLEM — G89.29 CHRONIC PAIN: Status: ACTIVE | Noted: 2019-04-13

## 2019-04-13 PROBLEM — I50.9 ACUTE ON CHRONIC CONGESTIVE HEART FAILURE: Status: ACTIVE | Noted: 2019-04-13

## 2019-04-15 PROBLEM — S32.9XXA PELVIC FRACTURE: Status: ACTIVE | Noted: 2019-04-15

## 2019-04-15 PROBLEM — I50.33 ACUTE ON CHRONIC DIASTOLIC CONGESTIVE HEART FAILURE: Status: ACTIVE | Noted: 2019-04-13

## 2019-04-15 PROBLEM — I48.91 ATRIAL FIBRILLATION WITH RVR: Status: ACTIVE | Noted: 2019-04-15

## 2019-04-16 PROBLEM — M54.50 LOW BACK PAIN: Status: ACTIVE | Noted: 2019-04-16

## 2019-04-21 PROBLEM — G93.41 ENCEPHALOPATHY, METABOLIC: Status: ACTIVE | Noted: 2019-04-21

## 2019-04-21 PROBLEM — G93.40 ENCEPHALOPATHY: Status: ACTIVE | Noted: 2019-04-21

## 2019-04-23 PROBLEM — R74.8 ELEVATED LIVER ENZYMES: Status: ACTIVE | Noted: 2019-04-23

## 2019-04-23 PROBLEM — R50.9 FEVER: Status: ACTIVE | Noted: 2019-04-23

## 2019-04-25 ENCOUNTER — TELEPHONE (OUTPATIENT)
Dept: GASTROENTEROLOGY | Facility: CLINIC | Age: 74
End: 2019-04-25

## 2019-04-25 PROBLEM — F41.9 SEVERE ANXIETY: Status: ACTIVE | Noted: 2019-04-25

## 2019-04-25 PROBLEM — Z75.8 DISCHARGE PLANNING ISSUES: Status: ACTIVE | Noted: 2019-04-25

## 2019-05-03 ENCOUNTER — TELEPHONE (OUTPATIENT)
Dept: HEMATOLOGY/ONCOLOGY | Facility: CLINIC | Age: 74
End: 2019-05-03

## 2022-01-25 NOTE — ADDENDUM NOTE
Addended by: JO ANN JOSEPH on: 7/25/2017 04:01 PM     Modules accepted: Orders     · Sodium 127>> 132 after dialysis, now 129  · Recheck metabolic panel

## 2022-06-13 NOTE — TELEPHONE ENCOUNTER
As MD will be out of the office on 5/23/19 called patient to rescheduled to 5/30/19, no answer, left message on vm of new appt on 5/30/19 at 1120 am, also asked that patient call back to confirm receipt of message.    appt mailed   Patient A/Ox4, briefly confused after waking up. VSS. Denies CP, SOB, dizziness/LH. LSCTA. +fl/BS. Voiding well in urinal. CMS intact. Tolerating regular diet without NV. Strict bedrest. Pain rated as tolerable throughout shift, managed with PRN oxycodone and some scheduled pain control. Likely surgery on the 17th and then planning for SNF afterwards per care coordinator. Patient has demonstrated ability to call appropriately. Patient is resting with call light within reach. Will continue to monitor.

## 2024-10-30 NOTE — TELEPHONE ENCOUNTER
----- Message from Cheryl Ghosh sent at 12/20/2017  2:44 PM CST -----  Contact: self  Patient is returning call regarding results.  Please call patient at [phone].  Thanks!    
----- Message from Winsome Beltre sent at 12/20/2017 11:13 AM CST -----  Returning your call.  Please call patient at [phone].  
Left message on pt recorder to return call to clinic.    
Omega 3 is generic otc    Tell her to take potassium 20 mEq every other day; tell her she needs appointment with pulmonology asap to discuss recent ED visit, Lasix and potassium as we discussed in office     Schedule repeat K in 2 weeks  
Patient verbally understood orders - scheduled labs   
Pt states she takes potassium 20eq every 3 days. She also states you were going to send in rx omega 3 to the pharmacy for her.  
show